# Patient Record
Sex: FEMALE | Race: WHITE | NOT HISPANIC OR LATINO | Employment: FULL TIME | URBAN - METROPOLITAN AREA
[De-identification: names, ages, dates, MRNs, and addresses within clinical notes are randomized per-mention and may not be internally consistent; named-entity substitution may affect disease eponyms.]

---

## 2017-01-14 ENCOUNTER — OFFICE VISIT (OUTPATIENT)
Dept: URGENT CARE | Facility: PHYSICIAN GROUP | Age: 22
End: 2017-01-14
Payer: COMMERCIAL

## 2017-01-14 VITALS
WEIGHT: 135 LBS | RESPIRATION RATE: 16 BRPM | HEIGHT: 65 IN | TEMPERATURE: 100 F | HEART RATE: 74 BPM | SYSTOLIC BLOOD PRESSURE: 108 MMHG | OXYGEN SATURATION: 99 % | DIASTOLIC BLOOD PRESSURE: 74 MMHG | BODY MASS INDEX: 22.49 KG/M2

## 2017-01-14 DIAGNOSIS — J01.90 ACUTE SINUSITIS, RECURRENCE NOT SPECIFIED, UNSPECIFIED LOCATION: ICD-10-CM

## 2017-01-14 DIAGNOSIS — B34.9 ACUTE BRONCHOSPASM DUE TO VIRAL INFECTION: ICD-10-CM

## 2017-01-14 DIAGNOSIS — J98.01 ACUTE BRONCHOSPASM DUE TO VIRAL INFECTION: ICD-10-CM

## 2017-01-14 PROCEDURE — 99214 OFFICE O/P EST MOD 30 MIN: CPT | Performed by: EMERGENCY MEDICINE

## 2017-01-14 RX ORDER — OXYCODONE HYDROCHLORIDE AND ACETAMINOPHEN 5; 325 MG/1; MG/1
1-2 TABLET ORAL EVERY 4 HOURS PRN
COMMUNITY
End: 2017-03-16

## 2017-01-14 RX ORDER — AMOXICILLIN AND CLAVULANATE POTASSIUM 875; 125 MG/1; MG/1
1 TABLET, FILM COATED ORAL 2 TIMES DAILY
Qty: 14 TAB | Refills: 0 | Status: SHIPPED | OUTPATIENT
Start: 2017-01-14 | End: 2017-03-16

## 2017-01-14 ASSESSMENT — ENCOUNTER SYMPTOMS
SINUS PAIN: 1
SHORTNESS OF BREATH: 0
HEADACHES: 1
SORE THROAT: 1
VOMITING: 0
COUGH: 1
RHINORRHEA: 0
NAUSEA: 1
DIARRHEA: 0
WHEEZING: 0
SPUTUM PRODUCTION: 1
HEMOPTYSIS: 0

## 2017-01-14 NOTE — MR AVS SNAPSHOT
"        Karol Baca   2017 4:00 PM   Office Visit   MRN: [id number]    Department:  [facility]   Dept Phone:  [phone]    Description:  Female : 1995   Provider:  Arsalan García M.D.           Reason for Visit     Sinus Problem sinus issues x1 wk      Allergies as of 2017     Allergen Noted Reactions    Sulfa Drugs 2010   Hives      You were diagnosed with     Acute bronchospasm due to viral infection   [933060]       Acute sinusitis, recurrence not specified, unspecified location   [7773903]         Vital Signs     Blood Pressure Pulse Temperature Respirations Height Weight    108/74 mmHg 74 37.8 °C (100 °F) 16 1.651 m (5' 5\") 61.236 kg (135 lb)    Body Mass Index Oxygen Saturation Smoking Status             22.47 kg/m2 99% Current Some Day Smoker         Basic Information     Date Of Birth Sex Race Ethnicity Preferred Language    1995 Female White Unknown English      Health Maintenance        Date Due Completion Dates    IMM HEP B VACCINE (1 of 3 - Primary Series) 1996 ---    IMM HEP A VACCINE (1 of 2 - Standard Series) 1997 ---    IMM HPV VACCINE (1 of 3 - Female 3 Dose Series) 2007 ---    IMM VARICELLA (CHICKENPOX) VACCINE (1 of 2 - 2 Dose Adolescent Series) 2009 ---    IMM MENINGOCOCCAL VACCINE (MCV4) (1 of 1) 2012 ---    IMM DTaP/Tdap/Td Vaccine (1 - Tdap) 2015 ---    IMM INFLUENZA (1) 2016 ---            Current Immunizations     No immunizations on file.      Below and/or attached are the medications your provider expects you to take. Review all of your home medications and newly ordered medications with your provider and/or pharmacist. Follow medication instructions as directed by your provider and/or pharmacist. Please keep your medication list with you and share with your provider. Update the information when medications are discontinued, doses are changed, or new medications (including over-the-counter products) are added; " and carry medication information at all times in the event of emergency situations     Allergies:  SULFA DRUGS - Hives               Medications  Valid as of: January 14, 2017 -  4:46 PM    Generic Name Brand Name Tablet Size Instructions for use    Albuterol Sulfate (AEROSOL POWDER, BREATH ACTIVATED) Albuterol Sulfate 108 (90 BASE) MCG/ACT Inhale 1-2 Puffs by mouth every 6 hours as needed (coughing, wheezing).        Amoxicillin-Pot Clavulanate (Tab) AUGMENTIN 875-125 MG Take 1 Tab by mouth 2 times a day.        Hydrocodone-Acetaminophen (Tab) NORCO 5-325 MG Take 1-2 Tabs by mouth every 6 hours as needed.        Ibuprofen (Tab) MOTRIN 200 MG Take 200 mg by mouth every 6 hours as needed.        Oxycodone-Acetaminophen (Tab) PERCOCET 5-325 MG Take 1-2 Tabs by mouth every four hours as needed.        .                 Medicines prescribed today were sent to:     [facility] [address] [place] [address] [address] [address]    Phone: [phone] Fax: [phone]    Open 24 Hours?: No      Medication refill instructions:       If your prescription bottle indicates you have medication refills left, it is not necessary to call your provider’s office. Please contact your pharmacy and they will refill your medication.    If your prescription bottle indicates you do not have any refills left, you may request refills at any time through one of the following ways: The online [employer] system (except Urgent Care), by calling your provider’s office, or by asking your pharmacy to contact your provider’s office with a refill request. Medication refills are processed only during regular business hours and may not be available until the next business day. Your provider may request additional information or to have a follow-up visit with you prior to refilling your medication.   *Please Note: Medication refills are assigned a new Rx number when refilled electronically. Your pharmacy may  indicate that no refills were authorized even though a new prescription for the same medication is available at the pharmacy. Please request the medicine by name with the pharmacy before contacting your provider for a refill.           Azure Solutions Access Code: [email or web address]  Expires: 1/22/2017  8:16 AM    Azure Solutions  A secure, online tool to manage your health information     [employer] Azure Solutions® is a secure, online tool that connects you to your personalized health information from the privacy of your home -- day or night - making it very easy for you to manage your healthcare. Once the activation process is completed, you can even access your medical information using the Azure Solutions misty, which is available for free in the Apple Misty store or Google Play store.     Azure Solutions provides the following levels of access (as shown below):   My Chart Features   [facility]  Specialists [facility]  Urgent  Care Non-[facility]  Primary Care  Doctor   Email your healthcare team securely and privately 24/7 X X X    Manage appointments: schedule your next appointment; view details of past/upcoming appointments X      Request prescription refills. X      View recent personal medical records, including lab and immunizations X X X X   View health record, including health history, allergies, medications X X X X   Read reports about your outpatient visits, procedures, consult and ER notes X X X X   See your discharge summary, which is a recap of your hospital and/or ER visit that includes your diagnosis, lab results, and care plan. X X       How to register for Azure Solutions:  1. Go to  [email or web address].  2. Click on the Sign Up Now box, which takes you to the New Member Sign Up page. You will need to provide the following information:  a. Enter your Azure Solutions Access Code exactly as it appears at the top of this page. (You will not need to use this code after you’ve completed the sign-up process. If you do not sign up  before the expiration date, you must request a new code.)   b. Enter your date of birth.   c. Enter your home email address.   d. Click Submit, and follow the next screen’s instructions.  3. Create a BUSINESS OWNERS ADVANTAGEt ID. This will be your BUSINESS O[employer] ADVANT[employer]t login ID and cannot be changed, so think of one that is secure and easy to remember.  4. Create a [employer] ADVANT[employer]t password. You can change your password at any time.  5. Enter your Password Reset Question and Answer. This can be used at a later time if you forget your password.   6. Enter your e-mail address. This allows you to receive e-mail notifications when new information is available in [employer].  7. Click Sign Up. You can now view your health information.    For assistance activating your [employer] account, call [phone]

## 2017-01-14 NOTE — Clinical Note
January 14, 2017       Patient: Karol Baca   YOB: 1995   Date of Visit: 1/14/2017         To Whom It May Concern:    It is my medical opinion that Karol Baca should not attend work for three days.      Sincerely,          Arsalan García M.D.  Electronically Signed

## 2017-01-15 NOTE — PROGRESS NOTES
Subjective:      Karol Baca is a 21 y.o. female who presents with Sinus Problem            URI   This is a new problem. The current episode started in the past 7 days. The problem has been gradually improving. Maximum temperature: subjective. Associated symptoms include congestion, coughing, headaches, nausea, sinus pain and a sore throat. Pertinent negatives include no chest pain, diarrhea, ear pain, rash, rhinorrhea, vomiting or wheezing.       Review of Systems   Constitutional: Positive for malaise/fatigue.   HENT: Positive for congestion and sore throat. Negative for ear pain, nosebleeds and rhinorrhea.    Respiratory: Positive for cough and sputum production. Negative for hemoptysis, shortness of breath and wheezing.    Cardiovascular: Negative for chest pain.   Gastrointestinal: Positive for nausea. Negative for vomiting and diarrhea.   Skin: Negative for rash.   Neurological: Positive for headaches.     PMH:  has a past medical history of Unspecified asthma(493.90). She also has no past medical history of Congestive heart failure (CMS-[facility]), Hypertension, Cancer (CMS-HCC), CAD (coronary artery disease), Renal disorder, Infectious disease, Stroke (CMS-HCC), Liver disease, Type II or unspecified type diabetes mellitus without mention of complication, not stated as uncontrolled, Seizure disorder (CMS-HCC), or Psychiatric disorder.  MEDS:   Current outpatient prescriptions:   •  oxycodone-acetaminophen (PERCOCET) 5-325 MG Tab, Take 1-2 Tabs by mouth every four hours as needed., Disp: , Rfl:   •  amoxicillin-clavulanate (AUGMENTIN) 875-125 MG Tab, Take 1 Tab by mouth 2 times a day., Disp: 14 Tab, Rfl: 0  •  Albuterol Sulfate 108 (90 BASE) MCG/ACT AEROSOL POWDER, BREATH ACTIVATED, Inhale 1-2 Puffs by mouth every 6 hours as needed (coughing, wheezing)., Disp: 1 Each, Rfl: 0  •  ibuprofen (MOTRIN) 200 MG Tab, Take 200 mg by mouth every 6 hours as needed., Disp: , Rfl:   •  hydrocodone-acetaminophen (NORCO) 5-325  "MG Tab per tablet, Take 1-2 Tabs by mouth every 6 hours as needed., Disp: 20 Tab, Rfl: 0  ALLERGIES:   Allergies   Allergen Reactions   • Sulfa Drugs Hives     SURGHX:   Past Surgical History   Procedure Laterality Date   • Other orthopedic surgery       SOCHX:  reports that she has been smoking Cigarettes.  She does not have any smokeless tobacco history on file. She reports that she drinks alcohol. She reports that she uses illicit drugs.  FH: family history is not on file.       Objective:     /74 mmHg  Pulse 74  Temp(Src) 37.8 °C (100 °F)  Resp 16  Ht 1.651 m (5' 5\")  Wt 61.236 kg (135 lb)  BMI 22.47 kg/m2  SpO2 99%     Physical Exam   Constitutional: She appears well-developed and well-nourished. She is cooperative. She does not appear ill. No distress.   HENT:   Right Ear: Tympanic membrane and ear canal normal.   Left Ear: Tympanic membrane and ear canal normal.   Nose: Mucosal edema present. No rhinorrhea. Right sinus exhibits maxillary sinus tenderness. Left sinus exhibits maxillary sinus tenderness.   Mouth/Throat: Uvula is midline and oropharynx is clear and moist.   Eyes: Right conjunctiva is injected. Left conjunctiva is injected.   Neck: Trachea normal. Neck supple.   Cardiovascular: Normal rate, regular rhythm and normal heart sounds.    Pulmonary/Chest: Effort normal. She has no decreased breath sounds. She has no wheezes. She has no rhonchi. She has no rales.   Lymphadenopathy:     She has no cervical adenopathy.   Neurological: She is alert.   Skin: Skin is warm and dry.   Psychiatric: She has a normal mood and affect.               Assessment/Plan:     1. Acute bronchospasm due to viral infection  - Albuterol Sulfate 108 (90 BASE) MCG/ACT AEROSOL POWDER, BREATH ACTIVATED; Inhale 1-2 Puffs by mouth every 6 hours as needed (coughing, wheezing).  Dispense: 1 Each; Refill: 0    2. Acute sinusitis, recurrence not specified, unspecified location  Afrin  Wait and see ATB provided  - " amoxicillin-clavulanate (AUGMENTIN) 875-125 MG Tab; Take 1 Tab by mouth 2 times a day.  Dispense: 14 Tab; Refill: 0

## 2017-03-16 ENCOUNTER — HOSPITAL ENCOUNTER (EMERGENCY)
Facility: MEDICAL CENTER | Age: 22
End: 2017-03-16
Attending: EMERGENCY MEDICINE
Payer: COMMERCIAL

## 2017-03-16 VITALS
TEMPERATURE: 98.9 F | DIASTOLIC BLOOD PRESSURE: 66 MMHG | SYSTOLIC BLOOD PRESSURE: 122 MMHG | OXYGEN SATURATION: 100 % | HEART RATE: 60 BPM | HEIGHT: 65 IN | BODY MASS INDEX: 23.51 KG/M2 | WEIGHT: 141.09 LBS | RESPIRATION RATE: 16 BRPM

## 2017-03-16 DIAGNOSIS — T14.8XXA OPEN WOUND: ICD-10-CM

## 2017-03-16 LAB
APPEARANCE UR: CLEAR
COLOR UR: YELLOW
GLUCOSE UR STRIP.AUTO-MCNC: NEGATIVE MG/DL
HCG UR QL: NEGATIVE
KETONES UR STRIP.AUTO-MCNC: NEGATIVE MG/DL
LEUKOCYTE ESTERASE UR QL STRIP.AUTO: NEGATIVE
NITRITE UR QL STRIP.AUTO: NEGATIVE
PH UR STRIP.AUTO: 7.5 [PH]
PROT UR QL STRIP: NEGATIVE MG/DL
RBC UR QL AUTO: ABNORMAL
SP GR UR STRIP.AUTO: 1.02

## 2017-03-16 PROCEDURE — 81025 URINE PREGNANCY TEST: CPT

## 2017-03-16 PROCEDURE — 81002 URINALYSIS NONAUTO W/O SCOPE: CPT

## 2017-03-16 PROCEDURE — 99284 EMERGENCY DEPT VISIT MOD MDM: CPT

## 2017-03-16 RX ORDER — FLUCONAZOLE 100 MG/1
100 TABLET ORAL DAILY
Qty: 3 TAB | Refills: 0 | Status: SHIPPED | OUTPATIENT
Start: 2017-03-16 | End: 2017-10-03

## 2017-03-16 ASSESSMENT — PAIN SCALES - GENERAL: PAINLEVEL_OUTOF10: 4

## 2017-03-16 NOTE — ED AVS SNAPSHOT
Home Care Instructions                                                                                                                Karol Baca   MRN: [id number]    Department:  [facility], Emergency Dept   Date of Visit:  3/16/2017            [facility] Emergency Dept    [address] [place] [address]    Phone:  [phone]      You were seen by     David Gregorio M.D.      Your Diagnosis Was     Open wound     T14.8       Follow-up Information     1. Schedule an appointment as soon as possible for a visit with Your Physician.    Specialty:  Emergency Medicine    Why:  As needed    Contact information    Varies        Medication Information     Review all of your home medications and newly ordered medications with your primary doctor and/or pharmacist as soon as possible. Follow medication instructions as directed by your doctor and/or pharmacist.     Please keep your complete medication list with you and share with your physician. Update the information when medications are discontinued, doses are changed, or new medications (including over-the-counter products) are added; and carry medication information at all times in the event of emergency situations.               Medication List      START taking these medications        Instructions    Morning Afternoon Evening Bedtime    fluconazole 100 MG Tabs   Commonly known as:  DIFLUCAN        Take 1 Tab by mouth every day. Once for yeast infection   Dose:  100 mg                             Where to Get Your Medications      These medications were sent to [facility] [address] [place] [address], [place] [address] [facility]  [address] [facility] [address]     Phone:  [phone]    - fluconazole 100 MG Tabs            Procedures and tests performed during your visit     Procedure/Test Number of Times Performed    NURSING COMMUNICATION 2    POC UA 1    POC URINE PREGNANCY 1        Discharge  Instructions       You have a small wound of the perineum. There is no evidence of STD or yeast infection. Apply antibiotic ointment once daily. If you develop a yeast infection try a dose of fluconazole.          Patient Information     Patient Information    Following emergency treatment: all patient requiring follow-up care must return either to a private physician or a clinic if your condition worsens before you are able to obtain further medical attention, please return to the emergency room.     Billing Information    At [facility], we work to make the billing process streamlined for our patients.  Our Representatives are here to answer any questions you may have regarding your hospital bill.  If you have insurance coverage and have supplied your insurance information to us, we will submit a claim to your insurer on your behalf.  Should you have any questions regarding your bill, we can be reached online or by phone as follows:  Online: You are able pay your bills online or live chat with our representatives about any billing questions you may have. We are here to help Monday - Friday from 8:00am to 7:30pm and 9:00am - 12:00pm on Saturdays.  Please visit [email or web address]  for more information.   Phone:  [phone] or [phone]    Please note that your emergency physician, surgeon, pathologist, radiologist, anesthesiologist, and other specialists are not employed by [facility] and will therefore bill separately for their services.  Please contact them directly for any questions concerning their bills at the numbers below:     Emergency Physician Services:  [phone]  [facility]:  [phone]  [facility]:  [phone]  [facility]:  [phone]    1. Your final bill may vary from the amount quoted upon discharge if all procedures are not complete at that time, or if your doctor has additional procedures of which  we are not aware. You will receive an additional bill if you return to the Emergency Department at [facility] for suture removal regardless of the facility of which the sutures were placed.     2. Please arrange for settlement of this account at the emergency registration.    3. All self-pay accounts are due in full at the time of treatment.  If you are unable to meet this obligation then payment is expected within 4-5 days.     4. If you have had radiology studies (CT, X-ray, Ultrasound, MRI), you have received a preliminary result during your emergency department visit. Please contact the radiology department [phone] to receive a copy of your final result. Please discuss the Final result with your primary physician or with the follow up physician provided.     Crisis Hotline:  [place] Crisis Hotline:  6-872-CTWZWCH or 1-116.230.9697  [place] Crisis Hotline:    1-680.702.8387 or 197-156-6690         ED Discharge Follow Up Questions    1. In order to provide you with very good care, we would like to follow up with a phone call in the next few days.  May we have your permission to contact you?     YES /  NO    2. What is the best phone number to call you? (       )_____-__________    3. What is the best time to call you?      Morning  /  Afternoon  /  Evening                   Patient Signature:  ____________________________________________________________    Date:  ____________________________________________________________

## 2017-03-16 NOTE — ED AVS SNAPSHOT
Tendr Access Code: [email or web address]  Expires: 3/21/2017 11:06 AM    Tendr  A secure, online tool to manage your health information     [employer] T[employer]® is a secure, online tool that connects you to your personalized health information from the privacy of your home -- day or night - making it very easy for you to manage your healthcare. Once the activation process is completed, you can even access your medical information using the Tendr misty, which is available for free in the Apple Misty store or Google Play store.     Tendr provides the following levels of access (as shown below):   My Chart Features   [facility] Primary Care Doctor [facility]  Specialists [facility]  Urgent  Care Non-[facility]  Primary Care  Doctor   Email your healthcare team securely and privately 24/7 X X X X   Manage appointments: schedule your next appointment; view details of past/upcoming appointments X      Request prescription refills. X      View recent personal medical records, including lab and immunizations X X X X   View health record, including health history, allergies, medications X X X X   Read reports about your outpatient visits, procedures, consult and ER notes X X X X   See your discharge summary, which is a recap of your hospital and/or ER visit that includes your diagnosis, lab results, and care plan. X X       How to register for Tendr:  1. Go to  [email or web address].  2. Click on the Sign Up Now box, which takes you to the New Member Sign Up page. You will need to provide the following information:  a. Enter your Tendr Access Code exactly as it appears at the top of this page. (You will not need to use this code after you’ve completed the sign-up process. If you do not sign up before the expiration date, you must request a new code.)   b. Enter your date of birth.   c. Enter your home email address.   d. Click Submit, and follow the next screen’s instructions.  3. Create a Tendr ID. This will be your Tendr  login ID and cannot be changed, so think of one that is secure and easy to remember.  4. Create a [employer] password. You can change your password at any time.  5. Enter your Password Reset Question and Answer. This can be used at a later time if you forget your password.   6. Enter your e-mail address. This allows you to receive e-mail notifications when new information is available in [employer].  7. Click Sign Up. You can now view your health information.    For assistance activating your [employer] account, call [phone]

## 2017-03-16 NOTE — ED AVS SNAPSHOT
3/16/2017          Karol Baca  [address]  [place] [address]    Dear Karol:    [facility] wants to ensure your discharge home is safe and you or your loved ones have had all your questions answered regarding your care after you leave the hospital.    You may receive a telephone call within two days of your discharge.  This call is to make certain you understand your discharge instructions as well as ensure we provided you with the best care possible during your stay with us.     The call will only last approximately 3-5 minutes and will be done by a nurse.    Once again, we want to ensure your discharge home is safe and that you have a clear understanding of any next steps in your care.  If you have any questions or concerns, please do not hesitate to contact us, we are here for you.  Thank you for choosing [facility] for your healthcare needs.    Sincerely,    Celso Leija    [facility]

## 2017-03-17 NOTE — ED NOTES
"Pt here with c/o    Chief Complaint   Patient presents with   • Vaginal Pain     pt noticed white bumps on labia this am, now bleeding from bumps   • Irregular Menses     pt having irregular periods, new onset- c/o dark brown blood with clots currently       /78 mmHg  Pulse 61  Temp(Src) 37.6 °C (99.7 °F)  Resp 12  Ht 1.651 m (5' 5\")  Wt 64 kg (141 lb 1.5 oz)  BMI 23.48 kg/m2  SpO2 100%    "

## 2017-03-17 NOTE — ED PROVIDER NOTES
ED Provider Note    CHIEF COMPLAINT   Chief Complaint   Patient presents with   • Vaginal Pain     pt noticed white bumps on labia this am, now bleeding from bumps   • Irregular Menses     pt having irregular periods, new onset- c/o dark brown blood with clots currently       HPI   Karol Baca is a 22 y.o. female who presents for white bumps on the introitus which she noticed today after bathing. There's been a scant amount of bleeding from them. They're itchy but not painful. Prior yeast infection. Denies pregnancy. Had a three-week irregular menstrual period which is atypical and just finished a day ago. She is a  S[employer] 1. She's had chlamydia remotely. History of chronic abdominal pain and laparoscopy without endometriosis or ovarian cysts. She is currently sexually active. No abdominal pain, fever, nausea or vomiting. She has had some diarrhea over the last 3 weeks    REVIEW OF SYSTEMS  Pertinent positives include: Perineal lesions, itching, diarrhea, prolonged menstrual period.  Pertinent negatives include: Fever, Skarria, urgency, frequency, rhinorrhea, sore throat, cough.  10+ systems reviewed and negative.     PAST MEDICAL HISTORY  Past Medical History   Diagnosis Date   • Unspecified asthma(493.90)        SOCIAL HISTORY  Social History   Substance Use Topics   • Smoking status: Current Some Day Smoker -- 0.50 packs/day     Types: Cigarettes   • Smokeless tobacco: Not on file   • Alcohol Use: Yes      Comment: occ     .    SURGICAL HISTORY  Past Surgical History   Procedure Laterality Date   • Other orthopedic surgery       frx arm   • Gyn surgery       exploratory lap       CURRENT MEDICATIONS  Home Medications     Reviewed by Yuli Loving R.N. (Registered Nurse) on 17 at 2024  Med List Status: Complete    Medication Last Dose Status          Patient [place] Taking any Medications                        ALLERGIES  Allergies   Allergen Reactions   • Sulfa Drugs Hives       PHYSICAL  "EXAM  VITAL SIGNS: /78 mmHg  Pulse 61  Temp(Src) 37.6 °C (99.7 °F)  Resp 12  Ht 1.651 m (5' 5\")  Wt 64 kg (141 lb 1.5 oz)  BMI 23.48 kg/m2  SpO2 100%  LMP 02/26/2017 (Approximate)  Constitutional: Well developed, Well nourished well-appearing.   HENT: Normocephalic, Atraumatic, Oropharynx moist.  Eyes: PERRLA, Conjunctiva pink.   Cardiovascular: Regular rate and rhythm, No murmurs, No rubs, No gallops.   Respiratory: Normal breath sounds, No respiratory distress, No wheezing.   Gastrointestinal: Soft, minimal suprapubic tenderness, no rebound or guarding, no masses.  Genitourinary: No flank tenderness. Perineum has a small 2 mm scratch wound. There are no other ulcerations or lesions, there is no white discharge. Since patient had no discharge or abdominal pain complaints pelvic exam not performed beyond perineal exam.  Skin: Warm, Dry, No erythema, No rash.   Back: No tenderness.   Neurologic: Alert & oriented x 3, Moves all extremities with strength.  Psychiatric: Affect normal, Judgment normal, Mood normal.       COURSE & MEDICAL DECISION MAKING;  Well-appearing patient presents with a bleeding wound of the perineum is a superficial scratch likely from cleaning. There is no evidence of herpes or candida. She denies abdominal pain or vaginal discharge. She's had an abnormal. These past 3 weeks but is not pregnant and there is no UTI.    PLAN:  Reassurance  Antibiotic ointment  Return for pain, fever, discharge    CONDITION: Stable.    FINAL IMPRESSION:  1. Open wound          Electronically signed by: David Gregorio, 3/16/2017 8:52 PM    "

## 2017-03-17 NOTE — ED NOTES
Pelvic exam performed on pt by ERP with female chaperone at the bedside. Privacy maintained. Pt tolerated procedure well. Pt assisted from lithotomy to supine position after procedure. Warm blanket provided. Call light within reach.

## 2017-03-17 NOTE — DISCHARGE INSTRUCTIONS
You have a small wound of the perineum. There is no evidence of STD or yeast infection. Apply antibiotic ointment once daily. If you develop a yeast infection try a dose of fluconazole.

## 2017-09-18 ENCOUNTER — HOSPITAL ENCOUNTER (OUTPATIENT)
Facility: MEDICAL CENTER | Age: 22
End: 2017-09-18
Attending: PHYSICIAN ASSISTANT
Payer: COMMERCIAL

## 2017-09-18 ENCOUNTER — OFFICE VISIT (OUTPATIENT)
Dept: URGENT CARE | Facility: PHYSICIAN GROUP | Age: 22
End: 2017-09-18
Payer: COMMERCIAL

## 2017-09-18 VITALS
HEART RATE: 56 BPM | RESPIRATION RATE: 12 BRPM | HEIGHT: 65 IN | BODY MASS INDEX: 23.32 KG/M2 | DIASTOLIC BLOOD PRESSURE: 58 MMHG | WEIGHT: 140 LBS | SYSTOLIC BLOOD PRESSURE: 100 MMHG | TEMPERATURE: 98.3 F | OXYGEN SATURATION: 98 %

## 2017-09-18 DIAGNOSIS — N89.8 VAGINAL DISCHARGE: ICD-10-CM

## 2017-09-18 DIAGNOSIS — N80.9 ENDOMETRIOSIS: ICD-10-CM

## 2017-09-18 DIAGNOSIS — R10.2 SUPRAPUBIC ABDOMINAL PAIN: ICD-10-CM

## 2017-09-18 LAB
APPEARANCE UR: ABNORMAL
BILIRUB UR STRIP-MCNC: NEGATIVE MG/DL
COLOR UR AUTO: YELLOW
GLUCOSE UR STRIP.AUTO-MCNC: NEGATIVE MG/DL
INT CON NEG: NEGATIVE
INT CON POS: POSITIVE
KETONES UR STRIP.AUTO-MCNC: NEGATIVE MG/DL
LEUKOCYTE ESTERASE UR QL STRIP.AUTO: ABNORMAL
NITRITE UR QL STRIP.AUTO: NEGATIVE
PH UR STRIP.AUTO: 8.5 [PH] (ref 5–8)
POC URINE PREGNANCY TEST: NEGATIVE
PROT UR QL STRIP: 300 MG/DL
RBC UR QL AUTO: ABNORMAL
SP GR UR STRIP.AUTO: 1
UROBILINOGEN UR STRIP-MCNC: NEGATIVE MG/DL

## 2017-09-18 PROCEDURE — 81002 URINALYSIS NONAUTO W/O SCOPE: CPT | Performed by: PHYSICIAN ASSISTANT

## 2017-09-18 PROCEDURE — 87510 GARDNER VAG DNA DIR PROBE: CPT

## 2017-09-18 PROCEDURE — 87660 TRICHOMONAS VAGIN DIR PROBE: CPT

## 2017-09-18 PROCEDURE — 87480 CANDIDA DNA DIR PROBE: CPT

## 2017-09-18 PROCEDURE — 87491 CHLMYD TRACH DNA AMP PROBE: CPT

## 2017-09-18 PROCEDURE — 87591 N.GONORRHOEAE DNA AMP PROB: CPT

## 2017-09-18 PROCEDURE — 81025 URINE PREGNANCY TEST: CPT | Performed by: PHYSICIAN ASSISTANT

## 2017-09-18 PROCEDURE — 99214 OFFICE O/P EST MOD 30 MIN: CPT | Performed by: PHYSICIAN ASSISTANT

## 2017-09-18 PROCEDURE — 87086 URINE CULTURE/COLONY COUNT: CPT

## 2017-09-18 RX ORDER — METRONIDAZOLE 7.5 MG/G
1 GEL VAGINAL
Qty: 5 TUBE | Refills: 0 | Status: SHIPPED | OUTPATIENT
Start: 2017-09-18 | End: 2017-09-23

## 2017-09-18 ASSESSMENT — ENCOUNTER SYMPTOMS
PSYCHIATRIC NEGATIVE: 1
GASTROINTESTINAL NEGATIVE: 1
CARDIOVASCULAR NEGATIVE: 1
MUSCULOSKELETAL NEGATIVE: 1
EYES NEGATIVE: 1
CONSTITUTIONAL NEGATIVE: 1
RESPIRATORY NEGATIVE: 1
NEUROLOGICAL NEGATIVE: 1

## 2017-09-18 NOTE — LETTER
September 18, 2017         Patient: Karol Baca   YOB: 1995   Date of Visit: 9/18/2017           To Whom it May Concern:    Karol Baca was seen in my clinic on 9/18/2017. Please excuse her from work today.    If you have any questions or concerns, please don't hesitate to call.        Sincerely,           Shahab Osborn P.A.-C.  Electronically Signed

## 2017-09-19 ENCOUNTER — TELEPHONE (OUTPATIENT)
Dept: URGENT CARE | Facility: CLINIC | Age: 22
End: 2017-09-19

## 2017-09-19 DIAGNOSIS — R10.2 SUPRAPUBIC ABDOMINAL PAIN: ICD-10-CM

## 2017-09-19 DIAGNOSIS — N89.8 VAGINAL DISCHARGE: ICD-10-CM

## 2017-09-19 LAB
CANDIDA DNA VAG QL PROBE+SIG AMP: NEGATIVE
G VAGINALIS DNA VAG QL PROBE+SIG AMP: NEGATIVE
T VAGINALIS DNA VAG QL PROBE+SIG AMP: NEGATIVE

## 2017-09-19 NOTE — PATIENT INSTRUCTIONS
Bacterial Vaginosis  Bacterial vaginosis is a vaginal infection that occurs when the normal balance of bacteria in the vagina is disrupted. It results from an overgrowth of certain bacteria. This is the most common vaginal infection in women of childbearing age. Treatment is important to prevent complications, especially in pregnant women, as it can cause a premature delivery.  CAUSES   Bacterial vaginosis is caused by an increase in harmful bacteria that are normally present in smaller amounts in the vagina. Several different kinds of bacteria can cause bacterial vaginosis. However, the reason that the condition develops is not fully understood.  RISK FACTORS  Certain activities or behaviors can put you at an increased risk of developing bacterial vaginosis, including:  · Having a new sex partner or multiple sex partners.  · Douching.  · Using an intrauterine device (IUD) for contraception.  Women do not get bacterial vaginosis from toilet seats, bedding, swimming pools, or contact with objects around them.  SIGNS AND SYMPTOMS   Some women with bacterial vaginosis have no signs or symptoms. Common symptoms include:  · Grey vaginal discharge.  · A fishlike odor with discharge, especially after sexual intercourse.  · Itching or burning of the vagina and vulva.  · Burning or pain with urination.  DIAGNOSIS   Your health care provider will take a medical history and examine the vagina for signs of bacterial vaginosis. A sample of vaginal fluid may be taken. Your health care provider will look at this sample under a microscope to check for bacteria and abnormal cells. A vaginal pH test may also be done.   TREATMENT   Bacterial vaginosis may be treated with antibiotic medicines. These may be given in the form of a pill or a vaginal cream. A second round of antibiotics may be prescribed if the condition comes back after treatment. Because bacterial vaginosis increases your risk for sexually transmitted diseases, getting  treated can help reduce your risk for chlamydia, gonorrhea, HIV, and herpes.  HOME CARE INSTRUCTIONS   · Only take over-the-counter or prescription medicines as directed by your health care provider.  · If antibiotic medicine was prescribed, take it as directed. Make sure you finish it even if you start to feel better.  · Tell all sexual partners that you have a vaginal infection. They should see their health care provider and be treated if they have problems, such as a mild rash or itching.  · During treatment, it is important that you follow these instructions:  ¨ Avoid sexual activity or use condoms correctly.  ¨ Do not douche.  ¨ Avoid alcohol as directed by your health care provider.  ¨ Avoid breastfeeding as directed by your health care provider.  SEEK MEDICAL CARE IF:   · Your symptoms are not improving after 3 days of treatment.  · You have increased discharge or pain.  · You have a fever.  MAKE SURE YOU:   · Understand these instructions.  · Will watch your condition.  · Will get help right away if you are not doing well or get worse.  FOR MORE INFORMATION   Centers for Disease Control and Prevention, Division of STD Prevention: www.cdc.gov/std  American Sexual Health Association (ARIC): www.ashastd.org      This information is not intended to replace advice given to you by your health care provider. Make sure you discuss any questions you have with your health care provider.     Document Released: 12/18/2006 Document Revised: 01/08/2016 Document Reviewed: 07/30/2014  [employer] Interactive Patient Education ©2016 [employer]  C

## 2017-09-19 NOTE — PROGRESS NOTES
Subjective:      Karol Baca is a 22 y.o. female who presents with Vaginal Discharge (x 1 weed discharge is milk looking and brownish/red, has had unprotected sex, took plan B approx 1 month ago)            HPI  Chief Complaint   Patient presents with   • Vaginal Discharge     x 1 weed discharge is milk looking and brownish/red, has had unprotected sex, took plan B approx 1 month ago       HPI:  Karol Baca is a 22 y.o. female who presents with worsening lower abdominal pains for the last month or two.  Possible hx of PCOS and maybe endometriosis.  Having milky discharge for the last week and darker color.  Patient denies HA, SOB, chest pain, palpitations, fever, chills, or n/v/d.    LMP: 2 months ago.    Took a plan B a month ago.    Some breast tenderness.    With a monogamous partner.      Slight odor.  Slightly  More after intercourse.    Past Medical History:   Diagnosis Date   • Unspecified asthma(493.90)        Past Surgical History:   Procedure Laterality Date   • GYN SURGERY      exploratory lap   • OTHER ORTHOPEDIC SURGERY      frx arm       No family history on file.    Social History     Social History   • Marital status: Single     Spouse name: N/A   • Number of children: N/A   • Years of education: N/A     Occupational History   • Not on file.     Social History Main Topics   • Smoking status: Current Some Day Smoker     Packs/day: 0.50     Types: Cigarettes   • Smokeless tobacco: Not on file   • Alcohol use Yes      Comment: occ   • Drug use:      Types: Intravenous   • Sexual activity: Not on file     Other Topics Concern   • Not on file     Social History Narrative   • No narrative on file         Current Outpatient Prescriptions:   •  fluconazole, 100 mg, Oral, DAILY    Allergies   Allergen Reactions   • Sulfa Drugs Hives        Review of Systems   Constitutional: Negative.    HENT: Negative.    Eyes: Negative.    Respiratory: Negative.    Cardiovascular: Negative.    Gastrointestinal:  "Negative.    Genitourinary:        Vaginal discharge   Musculoskeletal: Negative.    Skin: Negative.    Neurological: Negative.    Endo/Heme/Allergies: Negative.    Psychiatric/Behavioral: Negative.           Objective:     /58   Pulse (!) 56   Temp 36.8 °C (98.3 °F)   Resp 12   Ht 1.651 m (5' 5\")   Wt 63.5 kg (140 lb)   LMP 07/24/2017   SpO2 98%   BMI 23.30 kg/m²      Physical Exam        Constitutional:   Appropriately groomed, pleasant affect, well nourished, in NAD.    Head:   Normocephalic, atraumatic.    Eyes:   EOM's full, sclera white, conjunctiva not erythematous, and medial canthus without exudate bilaterally.    Throat:  Dentition wnl, mucosa moist without lesions.      Lungs:  Respiratory effort not labored without accessory muscle use.      Abdominal:  Abdomen soft to palpation with mild suprapubic ttp.  No masses or hepatosplenomegaly.  No CVA tenderness bilaterally to percussion.    Speculum pelvic exam demonstrates a moderate amount of white discharge at the fornix of the vaginal vault and a pink cervix. Moderate CMT noted.  Mild tenderness to palpation over the suprapubic region.  Adnexa are not ttp bilaterally.    Musculoskeletal:  Gait nonantalgic with a narrow base.    Derm:  Skin without rashes or lesions with good turgor pressure.      Psychiatric:  Mood, affect, and judgement appropriate.       Assessment/Plan:     1. Vaginal discharge  POCT PREGNANCY    VAGINAL PATHOGENS DNA PANEL    CHLAMYDIA/GC PCR URINE OR SWAB    metronidazole (METROGEL-VAGINAL) 0.75 % Gel    REFERRAL TO GYNECOLOGY    POCT Urinalysis   2. Endometriosis  REFERRAL TO GYNECOLOGY      Patient presents with Worsening vaginal discharge for the past week.  Hx of endometriosis vs PCOS via exploratory laparoscopic surgery. On exam today watermelon white-anastasia discharge in the vaginal vault with an erythematous cervix with no friability. CMT noted on exam. Plan to treat with metronidazole gel and obtain Chlamydia " gonorrhea and vaginal pathogen labs referral to gynecology. UA unremarkable urine pregnancy negative.    Patient was in agreement with this treatment plan and seemed to understand without barriers. All questions were encouraged and answered.  Reviewed signs and symptoms of when to seek emergency medical care.     Please note that this dictation was created using voice recognition software.  I have made every reasonable attempt to correct obvious errors, but I expect there are errors of nithya and possibly content that I did not discover before finalizing the note.

## 2017-09-20 LAB
C TRACH DNA SPEC QL NAA+PROBE: NEGATIVE
N GONORRHOEA DNA SPEC QL NAA+PROBE: NEGATIVE
SPECIMEN SOURCE: NORMAL

## 2017-09-20 NOTE — TELEPHONE ENCOUNTER
Called and spoke to patient regarding Vag path lab.  Symptoms have continued.  Advised to wait until gc/chlamydia test is back.  Patient has not filled flaygl, waiting until Thursday.  All questions encouraged and answered.

## 2017-09-21 ENCOUNTER — TELEPHONE (OUTPATIENT)
Dept: URGENT CARE | Facility: CLINIC | Age: 22
End: 2017-09-21

## 2017-09-21 LAB
BACTERIA UR CULT: NORMAL
SIGNIFICANT IND 70042: NORMAL
SOURCE SOURCE: NORMAL

## 2017-09-21 NOTE — TELEPHONE ENCOUNTER
Spoke to patient and informed her of neg GC/Chlamydia.  Advised establishing with PCP.  Referral to gyn pending.  Did advise filling flagyl for trial of improvement (vaginal gel).  Advised her to establish to discuss PAP smear and further work-up/tx of endometriosis.  All questions encouraged and answered.

## 2017-09-22 ENCOUNTER — OFFICE VISIT (OUTPATIENT)
Dept: MEDICAL GROUP | Facility: PHYSICIAN GROUP | Age: 22
End: 2017-09-22
Payer: COMMERCIAL

## 2017-09-22 VITALS
RESPIRATION RATE: 16 BRPM | OXYGEN SATURATION: 100 % | HEIGHT: 65 IN | BODY MASS INDEX: 23.49 KG/M2 | WEIGHT: 141 LBS | SYSTOLIC BLOOD PRESSURE: 102 MMHG | DIASTOLIC BLOOD PRESSURE: 74 MMHG | HEART RATE: 60 BPM | TEMPERATURE: 98.6 F

## 2017-09-22 DIAGNOSIS — G43.019 INTRACTABLE MIGRAINE WITHOUT AURA AND WITHOUT STATUS MIGRAINOSUS: ICD-10-CM

## 2017-09-22 DIAGNOSIS — Z00.00 ROUTINE GENERAL MEDICAL EXAMINATION AT A HEALTH CARE FACILITY: ICD-10-CM

## 2017-09-22 DIAGNOSIS — J45.20 MILD INTERMITTENT ASTHMA WITHOUT COMPLICATION: ICD-10-CM

## 2017-09-22 DIAGNOSIS — B97.89 SORE THROAT (VIRAL): ICD-10-CM

## 2017-09-22 DIAGNOSIS — J02.8 SORE THROAT (VIRAL): ICD-10-CM

## 2017-09-22 DIAGNOSIS — F33.41 RECURRENT MAJOR DEPRESSIVE DISORDER, IN PARTIAL REMISSION (HCC): ICD-10-CM

## 2017-09-22 DIAGNOSIS — N80.9 ENDOMETRIOSIS: ICD-10-CM

## 2017-09-22 PROBLEM — G43.009 MIGRAINE WITHOUT AURA: Status: ACTIVE | Noted: 2017-09-22

## 2017-09-22 PROCEDURE — 99214 OFFICE O/P EST MOD 30 MIN: CPT | Performed by: INTERNAL MEDICINE

## 2017-09-22 ASSESSMENT — PATIENT HEALTH QUESTIONNAIRE - PHQ9: CLINICAL INTERPRETATION OF PHQ2 SCORE: 0

## 2017-09-22 NOTE — ASSESSMENT & PLAN NOTE
Only on exertion. She has not used inhaler in long time. She denies shortness of breath, wheezing, cough

## 2017-09-22 NOTE — ASSESSMENT & PLAN NOTE
She has frequent absences leave from work. She has had laparoscopy. She was told hysterectomy. She got scared and never followed up. She has been taken percocet for pain. No pain today. Her menstrual cycle is irregular, painful.   She is asking for FMLA paper to fill out. I will help her, but she needs to be evaluated by GYN

## 2017-09-23 NOTE — PROGRESS NOTES
New Patient to Establish    Reason to establish: FMLA paper, endometriosis    Karol Baca is a 22 y.o. female here today for evaluation and management of:    Endometriosis  She has frequent absences leave from work. She has had laparoscopy. She was told hysterectomy. She got scared and never followed up. She has been taken percocet for pain. No pain today. Her menstrual cycle is irregular, painful.   She is asking for FMLA paper to fill out. I will help her, but she needs to be evaluated by GYN     Mild intermittent asthma without complication  Only on exertion. She has not used inhaler in long time. She denies shortness of breath, wheezing, cough    Migraine without aura  Headaches associated with menstrual cycle. No aura. She takes Excedrin prn    Sore throat (viral)  She had just gone through a cold, she has white discharges on the left tonsils.     Recurrent major depressive disorder, in partial remission (CMS-HCC)  At the end of the appointment, she reports depression, sometimes binge drinking, smoking. She stopped marijuana, but she used to do . She denies ever use of IV drugs, or suicidal thoughts or attempts. She tells me that she is feeling fine, she had tried zoloft, but she does not want to be on meds       Past Medical History:   Diagnosis Date   • Sore throat (viral) 9/22/2017   • Recurrent major depressive disorder, in partial remission (CMS-HCC) 9/22/2017   • Unspecified asthma        Current Outpatient Prescriptions   Medication Sig Dispense Refill   • metronidazole (METROGEL-VAGINAL) 0.75 % Gel Insert 1 Applicator in vagina every bedtime for 5 days. 5 Tube 0   • fluconazole (DIFLUCAN) 100 MG Tab Take 1 Tab by mouth every day. Once for yeast infection 3 Tab 0     No current facility-administered medications for this visit.        Allergies as of 09/22/2017 - Reviewed 09/22/2017   Allergen Reaction Noted   • Sulfa drugs Hives 08/12/2010       Social History     Social History   • Marital status:  "Single     Spouse name: N/A   • Number of children: N/A   • Years of education: N/A     Occupational History   • Not on file.     Social History Main Topics   • Smoking status: Former Smoker     Packs/day: 0.50     Types: Cigarettes     Quit date: 9/15/2017   • Smokeless tobacco: Never Used   • Alcohol use Yes      Comment: occ   • Drug use: No      Comment: smoked marijuana,    • Sexual activity: Yes     Other Topics Concern   • Not on file     Social History Narrative   • No narrative on file       Family History   Problem Relation Age of Onset   • Stroke Maternal Grandmother    • Cancer Maternal Grandfather      lung cancer   • Heart Disease Paternal Grandmother        Past Surgical History:   Procedure Laterality Date   • GYN SURGERY      exploratory lap   • OTHER ORTHOPEDIC SURGERY      frx arm   • WIDE EXCISION MELANOMA, LEG, W/POSS.STSG         ROS: All systems reviewed are negative except for HPI    /74   Pulse 60   Temp 37 °C (98.6 °F)   Resp 16   Ht 1.651 m (5' 5\")   Wt 64 kg (141 lb)   LMP 07/24/2017   SpO2 100%   Breastfeeding? No   BMI 23.46 kg/m²     Physical Exam  General:  Alert and oriented, No apparent distress.  Eyes: Pupils equal and reactive. No scleral icterus. EOMI  Throat: + erythema and white layers on left tonsils . Oral mucosa moist, oral dental intact  Neck: Supple. No cervical or supraclavicular lymphadenopathy noted. Thyroid not enlarged.  Lungs: normal effort,  Clear to auscultation  Cardiovascular: Regular rate and rhythm. No murmurs, rubs or gallops, pulses intact   Abdomen:  Soft, +BS, no tenderness. No rebound or guarding noted. No hepato or splenomegaly   Extremities: No clubbing, cyanosis, edema.  Neuro: cranial nerves intact, sensation intact   Muscle skeletal:   Skin: Clear. No rash or suspicious skin lesions noted.  Other:       Assessment and Plan    1. Endometriosis  CBC to follow rule out anemia. Advised patient follow up with GYN referral which was placed by " urgent care physician  - COMP METABOLIC PANEL; Future  - CBC WITH DIFFERENTIAL; Future  - TSH WITH REFLEX TO FT4; Future    2. Mild intermittent asthma without complication  Refill albuterol as needed    3. Routine general medical examination at a health care facility    - COMP METABOLIC PANEL; Future    4. Intractable migraine without aura and without status migrainosus  Stable, Excedrin prn. I advised pt to follow up if in case frequent headache     5. Sore throat (viral)  Well hydration, rest. Call us if worse, or fever     6. Recurrent major depressive disorder, in partial remission (CMS-HCC)  She tells me that she is great. Some counseling provided, but pt is not open today.       Followup: Return if symptoms worsen or fail to improve.    Signed by: Haydre Rene M.D.

## 2017-09-23 NOTE — ASSESSMENT & PLAN NOTE
At the end of the appointment, she reports depression, sometimes binge drinking, smoking. She stopped marijuana, but she used to do . She denies ever use of IV drugs, or suicidal thoughts or attempts. She tells me that she is feeling fine, she had tried zoloft, but she does not want to be on meds

## 2017-09-25 ENCOUNTER — HOSPITAL ENCOUNTER (OUTPATIENT)
Dept: LAB | Facility: MEDICAL CENTER | Age: 22
End: 2017-09-25
Attending: INTERNAL MEDICINE
Payer: COMMERCIAL

## 2017-09-25 DIAGNOSIS — Z00.00 ROUTINE GENERAL MEDICAL EXAMINATION AT A HEALTH CARE FACILITY: ICD-10-CM

## 2017-09-25 DIAGNOSIS — N80.9 ENDOMETRIOSIS: ICD-10-CM

## 2017-09-25 LAB
ALBUMIN SERPL BCP-MCNC: 3.7 G/DL (ref 3.2–4.9)
ALBUMIN/GLOB SERPL: 1.1 G/DL
ALP SERPL-CCNC: 52 U/L (ref 30–99)
ALT SERPL-CCNC: 14 U/L (ref 2–50)
ANION GAP SERPL CALC-SCNC: 7 MMOL/L (ref 0–11.9)
AST SERPL-CCNC: 16 U/L (ref 12–45)
BASOPHILS # BLD AUTO: 0.7 % (ref 0–1.8)
BASOPHILS # BLD: 0.06 K/UL (ref 0–0.12)
BILIRUB SERPL-MCNC: 0.3 MG/DL (ref 0.1–1.5)
BUN SERPL-MCNC: 16 MG/DL (ref 8–22)
CALCIUM SERPL-MCNC: 9.2 MG/DL (ref 8.5–10.5)
CHLORIDE SERPL-SCNC: 109 MMOL/L (ref 96–112)
CO2 SERPL-SCNC: 25 MMOL/L (ref 20–33)
CREAT SERPL-MCNC: 0.77 MG/DL (ref 0.5–1.4)
EOSINOPHIL # BLD AUTO: 0.26 K/UL (ref 0–0.51)
EOSINOPHIL NFR BLD: 2.9 % (ref 0–6.9)
ERYTHROCYTE [DISTWIDTH] IN BLOOD BY AUTOMATED COUNT: 42.5 FL (ref 35.9–50)
GFR SERPL CREATININE-BSD FRML MDRD: >60 ML/MIN/1.73 M 2
GLOBULIN SER CALC-MCNC: 3.3 G/DL (ref 1.9–3.5)
GLUCOSE SERPL-MCNC: 86 MG/DL (ref 65–99)
HCT VFR BLD AUTO: 44.5 % (ref 37–47)
HGB BLD-MCNC: 14.4 G/DL (ref 12–16)
IMM GRANULOCYTES # BLD AUTO: 0.04 K/UL (ref 0–0.11)
IMM GRANULOCYTES NFR BLD AUTO: 0.4 % (ref 0–0.9)
LYMPHOCYTES # BLD AUTO: 2.26 K/UL (ref 1–4.8)
LYMPHOCYTES NFR BLD: 25.2 % (ref 22–41)
MCH RBC QN AUTO: 29.6 PG (ref 27–33)
MCHC RBC AUTO-ENTMCNC: 32.4 G/DL (ref 33.6–35)
MCV RBC AUTO: 91.4 FL (ref 81.4–97.8)
MONOCYTES # BLD AUTO: 0.57 K/UL (ref 0–0.85)
MONOCYTES NFR BLD AUTO: 6.4 % (ref 0–13.4)
NEUTROPHILS # BLD AUTO: 5.78 K/UL (ref 2–7.15)
NEUTROPHILS NFR BLD: 64.4 % (ref 44–72)
NRBC # BLD AUTO: 0 K/UL
NRBC BLD AUTO-RTO: 0 /100 WBC
PLATELET # BLD AUTO: 357 K/UL (ref 164–446)
PMV BLD AUTO: 11.2 FL (ref 9–12.9)
POTASSIUM SERPL-SCNC: 4.2 MMOL/L (ref 3.6–5.5)
PROT SERPL-MCNC: 7 G/DL (ref 6–8.2)
RBC # BLD AUTO: 4.87 M/UL (ref 4.2–5.4)
SODIUM SERPL-SCNC: 141 MMOL/L (ref 135–145)
TSH SERPL DL<=0.005 MIU/L-ACNC: 2.59 UIU/ML (ref 0.3–3.7)
WBC # BLD AUTO: 9 K/UL (ref 4.8–10.8)

## 2017-09-25 PROCEDURE — 85025 COMPLETE CBC W/AUTO DIFF WBC: CPT

## 2017-09-25 PROCEDURE — 84443 ASSAY THYROID STIM HORMONE: CPT

## 2017-09-25 PROCEDURE — 36415 COLL VENOUS BLD VENIPUNCTURE: CPT

## 2017-09-25 PROCEDURE — 80053 COMPREHEN METABOLIC PANEL: CPT

## 2017-09-26 ENCOUNTER — TELEPHONE (OUTPATIENT)
Dept: MEDICAL GROUP | Facility: PHYSICIAN GROUP | Age: 22
End: 2017-09-26

## 2017-09-26 NOTE — TELEPHONE ENCOUNTER
----- Message from Hayder Rene M.D. sent at 9/26/2017  7:05 AM PDT -----  Please let patient know that electrolytes, kidney function, liver enzymes, thyroid test , fasting glucose are all normal  Thank you,  Hayder Rene M.D.

## 2017-09-26 NOTE — TELEPHONE ENCOUNTER
VOICEMAIL  1. Caller Name: Karol Baca                        Call Back Number: [phone] (home)       2. Message: Called and left patient a message to call back to get lab results     3. Patient approves office to leave a detailed voicemail/MyChart message: N\A

## 2017-10-02 ENCOUNTER — TELEPHONE (OUTPATIENT)
Dept: MEDICAL GROUP | Facility: PHYSICIAN GROUP | Age: 22
End: 2017-10-02

## 2017-10-02 ENCOUNTER — APPOINTMENT (OUTPATIENT)
Dept: MEDICAL GROUP | Facility: PHYSICIAN GROUP | Age: 22
End: 2017-10-02
Payer: COMMERCIAL

## 2017-10-03 ENCOUNTER — OFFICE VISIT (OUTPATIENT)
Dept: MEDICAL GROUP | Facility: PHYSICIAN GROUP | Age: 22
End: 2017-10-03
Payer: COMMERCIAL

## 2017-10-03 VITALS
HEIGHT: 65 IN | TEMPERATURE: 98.1 F | RESPIRATION RATE: 16 BRPM | OXYGEN SATURATION: 95 % | DIASTOLIC BLOOD PRESSURE: 74 MMHG | WEIGHT: 141 LBS | BODY MASS INDEX: 23.49 KG/M2 | SYSTOLIC BLOOD PRESSURE: 100 MMHG | HEART RATE: 77 BPM

## 2017-10-03 DIAGNOSIS — J02.8 SORE THROAT (VIRAL): ICD-10-CM

## 2017-10-03 DIAGNOSIS — B97.89 SORE THROAT (VIRAL): ICD-10-CM

## 2017-10-03 DIAGNOSIS — Z30.011 ORAL CONTRACEPTION INITIATION: ICD-10-CM

## 2017-10-03 DIAGNOSIS — Z02.89 ENCOUNTER TO OBTAIN EXCUSE FROM WORK: ICD-10-CM

## 2017-10-03 DIAGNOSIS — N80.9 ENDOMETRIOSIS: ICD-10-CM

## 2017-10-03 DIAGNOSIS — J45.20 MILD INTERMITTENT ASTHMA WITHOUT COMPLICATION: ICD-10-CM

## 2017-10-03 DIAGNOSIS — Z23 NEED FOR VACCINATION: ICD-10-CM

## 2017-10-03 PROCEDURE — 99214 OFFICE O/P EST MOD 30 MIN: CPT | Performed by: INTERNAL MEDICINE

## 2017-10-03 RX ORDER — DROSPIRENONE AND ETHINYL ESTRADIOL 0.03MG-3MG
1 KIT ORAL DAILY
Qty: 28 TAB | Refills: 6 | Status: SHIPPED | OUTPATIENT
Start: 2017-10-03 | End: 2018-10-18

## 2017-10-03 NOTE — LETTER
October 3, 2017       Patient: Karol Baca   YOB: 1995   Date of Visit: 10/3/2017          To Whom It May Concern:     It is my medical opinion that Karol Baca return to full duty, no restrictions 10/03/2017.     If you have any questions or concerns, please don't hesitate to call [phone]              Sincerely,              Hayder Rene M.D.  Electronically Signed

## 2017-10-03 NOTE — PROGRESS NOTES
Subjective:   Karol Baca is a 22 y.o. female here today for paper work, oral contraceptives     Pt presented for FMLA paper fill out. She has severe endometriosis.   She cannot go to work, when she is severe pain, she has been evaluated by GYN in the past. She was offered hysterectomy, but pt is so young. She has heavy, painful menstrual cycle. She is open to oral contraceptive. She stopped smoking. No contraindication. FMLA docs completed. Excuse letter for work provided   At previous apt  She had sore throat, and hypertrophy of tonsils with white discharges. It is resolved, no sore throat. She is doing better today.   Asthma stable. She denies wheezing, shortness of breath, leg swelling    Current medicines (including changes today)  Current Outpatient Prescriptions   Medication Sig Dispense Refill   • drospirenone-ethinyl estradiol (NOLAN) 3-0.03 MG per tablet Take 1 Tab by mouth every day. 28 Tab 6     No current facility-administered medications for this visit.      She  has a past medical history of Recurrent major depressive disorder, in partial remission (CMS-[facility]) (9/22/2017); Sore throat (viral) (9/22/2017); and Unspecified asthma. She also has no past medical history of CAD (coronary artery disease); Cancer (CMS-HCC); Congestive heart failure (CMS-HCC); Hypertension; Liver disease; Renal disorder; Seizure disorder (CMS-HCC); Stroke (CMS-HCC); or Type II or unspecified type diabetes mellitus without mention of complication, not stated as uncontrolled.    Current Outpatient Prescriptions   Medication Sig Dispense Refill   • drospirenone-ethinyl estradiol (NOLAN) 3-0.03 MG per tablet Take 1 Tab by mouth every day. 28 Tab 6     No current facility-administered medications for this visit.        Allergies as of 10/03/2017 - Reviewed 10/03/2017   Allergen Reaction Noted   • Sulfa drugs Hives 08/12/2010       Social History     Social History   • Marital status: Single     Spouse name: N/A   • Number of  "children: N/A   • Years of education: N/A     Occupational History   • Not on file.     Social History Main Topics   • Smoking status: Former Smoker     Packs/day: 0.50     Types: Cigarettes     Quit date: 9/15/2017   • Smokeless tobacco: Never Used   • Alcohol use Yes      Comment: occ   • Drug use: No      Comment: smoked marijuana,    • Sexual activity: Yes     Other Topics Concern   • Not on file     Social History Narrative   • No narrative on file        Family History   Problem Relation Age of Onset   • Stroke Maternal Grandmother    • Cancer Maternal Grandfather      lung cancer   • Heart Disease Paternal Grandmother        Past Surgical History:   Procedure Laterality Date   • GYN SURGERY      exploratory lap   • OTHER ORTHOPEDIC SURGERY      frx arm   • WIDE EXCISION MELANOMA, LEG, W/POSS.STSG         ROS   No chest pain, no shortness of breath, no abdominal pain       Objective:     Blood pressure 100/74, pulse 77, temperature 36.7 °C (98.1 °F), resp. rate 16, height 1.651 m (5' 5\"), weight 64 kg (141 lb), last menstrual period 07/24/2017, SpO2 95 %, not currently breastfeeding. Body mass index is 23.46 kg/m².   Physical Exam:  Constitutional: Alert, no distress.  Skin: Warm, dry, good turgor, no rashes in visible areas.  Eye: Equal, round and reactive, conjunctiva clear, lids normal.  ENMT: Lips without lesions, good dentition, oropharynx clear.  Neck: Trachea midline, no masses, no thyromegaly. No cervical or supraclavicular lymphadenopathy  Respiratory: Unlabored respiratory effort,   Psych: Alert and oriented x3, normal affect and mood.        Assessment and Plan:   The following treatment plan was discussed        1. Sore throat (viral)  Resolved,,    2. Endometriosis  3. Oral contraception initiation  4. Encounter to obtain excuse from work  Paper work for work provided. I advised pt OCP. Follow up with GYN or me for PAP smear. Avoid narcotics   No contraindication for OCP at this time. Not a " smoker, no family h/o DVTs   She is doing better   - drospirenone-ethinyl estradiol (NOLAN) 3-0.03 MG per tablet; Take 1 Tab by mouth every day.  Dispense: 28 Tab; Refill: 6      4. Mild intermittent asthma without complication  Stable. Inhaler prn         Followup: Return if symptoms worsen or fail to improve.

## 2017-10-06 ENCOUNTER — TELEPHONE (OUTPATIENT)
Dept: MEDICAL GROUP | Facility: PHYSICIAN GROUP | Age: 22
End: 2017-10-06

## 2017-10-06 NOTE — TELEPHONE ENCOUNTER
Patient called and LVM stating stating that her leave of absence form Question 5 needed to be completed with dates of 9/8-9/11, 9/18-9/25 and she is to return to work on 10/3.    Also received a call from [employer] requesting a call back at [phone] to discuss form.

## 2017-10-06 NOTE — TELEPHONE ENCOUNTER
I spoke with [employer] representative and they confirmed what patient called about earlier.  Forms updated,  initialed changes per [employer]'s directions, faxed back, confirmation received, scanned to media.

## 2017-10-11 ENCOUNTER — OFFICE VISIT (OUTPATIENT)
Dept: MEDICAL GROUP | Facility: PHYSICIAN GROUP | Age: 22
End: 2017-10-11
Payer: COMMERCIAL

## 2017-10-11 VITALS
OXYGEN SATURATION: 97 % | SYSTOLIC BLOOD PRESSURE: 112 MMHG | RESPIRATION RATE: 16 BRPM | HEART RATE: 66 BPM | WEIGHT: 136 LBS | TEMPERATURE: 98.3 F | DIASTOLIC BLOOD PRESSURE: 72 MMHG | BODY MASS INDEX: 22.66 KG/M2 | HEIGHT: 65 IN

## 2017-10-11 DIAGNOSIS — Z02.89 ENCOUNTER TO OBTAIN EXCUSE FROM WORK: ICD-10-CM

## 2017-10-11 DIAGNOSIS — N80.9 ENDOMETRIOSIS: ICD-10-CM

## 2017-10-11 PROCEDURE — 99999 PR NO CHARGE: CPT | Performed by: INTERNAL MEDICINE

## 2017-10-11 NOTE — PROGRESS NOTES
Subjective:   Karol Baca is a 22 y.o. female here today for follow up on FMLA paper work up   Last appointment I filled out the family excuse paper from work. There were couple of mistake on the forms, and dates. Correction provide. She is doing well, she has been able to go to work, she has not tried OCP yet. No flares of endometriosis during this time.     Current medicines (including changes today)  Current Outpatient Prescriptions   Medication Sig Dispense Refill   • drospirenone-ethinyl estradiol (NOLAN) 3-0.03 MG per tablet Take 1 Tab by mouth every day. 28 Tab 6     No current facility-administered medications for this visit.      She  has a past medical history of Recurrent major depressive disorder, in partial remission (CMS-[facility]) (9/22/2017); Sore throat (viral) (9/22/2017); and Unspecified asthma(493.90). She also has no past medical history of CAD (coronary artery disease); Cancer (CMS-HCC); Congestive heart failure (CMS-HCC); Hypertension; Liver disease; Renal disorder; Seizure disorder (CMS-HCC); Stroke (CMS-HCC); or Type II or unspecified type diabetes mellitus without mention of complication, not stated as uncontrolled.    Current Outpatient Prescriptions   Medication Sig Dispense Refill   • drospirenone-ethinyl estradiol (NOLAN) 3-0.03 MG per tablet Take 1 Tab by mouth every day. 28 Tab 6     No current facility-administered medications for this visit.        Allergies as of 10/11/2017 - Reviewed 10/11/2017   Allergen Reaction Noted   • Sulfa drugs Hives 08/12/2010       Social History     Social History   • Marital status: Single     Spouse name: N/A   • Number of children: N/A   • Years of education: N/A     Occupational History   • Not on file.     Social History Main Topics   • Smoking status: Former Smoker     Packs/day: 0.50     Types: Cigarettes     Quit date: 9/15/2017   • Smokeless tobacco: Never Used   • Alcohol use Yes      Comment: occ   • Drug use: No      Comment: smoked  "marijuana,    • Sexual activity: Yes     Other Topics Concern   • Not on file     Social History Narrative   • No narrative on file        Family History   Problem Relation Age of Onset   • Stroke Maternal Grandmother    • Cancer Maternal Grandfather      lung cancer   • Heart Disease Paternal Grandmother        Past Surgical History:   Procedure Laterality Date   • GYN SURGERY      exploratory lap   • OTHER ORTHOPEDIC SURGERY      frx arm   • WIDE EXCISION MELANOMA, LEG, W/POSS.STSG         ROS  No chest pain, no shortness of breath, no abdominal pain       Objective:     Blood pressure 112/72, pulse 66, temperature 36.8 °C (98.3 °F), resp. rate 16, height 1.651 m (5' 5\"), weight 61.7 kg (136 lb), last menstrual period 10/01/2017, SpO2 97 %, not currently breastfeeding. Body mass index is 22.63 kg/m².   Physical Exam:  Constitutional: Alert, no distress.  Skin: Warm, dry, good turgor, no rashes in visible areas.  Respiratory: Unlabored respiratory effort, lungs  Psych: Alert and oriented x3, normal affect and mood.        Assessment and Plan:   The following treatment plan was discussed    1. Encounter to obtain excuse from work  2. Endometriosis  Paper filled out. Encouraged to start OCP       Followup: Return if symptoms worsen or fail to improve.         "

## 2018-04-05 ENCOUNTER — APPOINTMENT (OUTPATIENT)
Dept: OBGYN | Facility: MEDICAL CENTER | Age: 23
End: 2018-04-05
Payer: COMMERCIAL

## 2018-06-09 ENCOUNTER — HOSPITAL ENCOUNTER (OUTPATIENT)
Dept: LAB | Facility: MEDICAL CENTER | Age: 23
End: 2018-06-09
Attending: PATHOLOGY
Payer: COMMERCIAL

## 2018-06-09 LAB — HCG SERPL QL: NEGATIVE

## 2018-06-09 PROCEDURE — 84703 CHORIONIC GONADOTROPIN ASSAY: CPT

## 2018-06-09 PROCEDURE — 36415 COLL VENOUS BLD VENIPUNCTURE: CPT

## 2018-07-25 ENCOUNTER — HOSPITAL ENCOUNTER (OUTPATIENT)
Facility: MEDICAL CENTER | Age: 23
End: 2018-07-25
Attending: OBSTETRICS & GYNECOLOGY
Payer: COMMERCIAL

## 2018-07-25 ENCOUNTER — HOSPITAL ENCOUNTER (OUTPATIENT)
Dept: LAB | Facility: MEDICAL CENTER | Age: 23
End: 2018-07-25
Attending: OBSTETRICS & GYNECOLOGY
Payer: COMMERCIAL

## 2018-07-25 ENCOUNTER — GYNECOLOGY VISIT (OUTPATIENT)
Dept: OBGYN | Facility: MEDICAL CENTER | Age: 23
End: 2018-07-25
Payer: COMMERCIAL

## 2018-07-25 VITALS
HEIGHT: 65 IN | DIASTOLIC BLOOD PRESSURE: 66 MMHG | WEIGHT: 134 LBS | BODY MASS INDEX: 22.33 KG/M2 | SYSTOLIC BLOOD PRESSURE: 102 MMHG

## 2018-07-25 DIAGNOSIS — N93.9 ABNORMAL UTERINE BLEEDING: ICD-10-CM

## 2018-07-25 DIAGNOSIS — R10.2 PELVIC PAIN: ICD-10-CM

## 2018-07-25 DIAGNOSIS — Z12.4 SCREENING FOR CERVICAL CANCER: ICD-10-CM

## 2018-07-25 DIAGNOSIS — Z11.51 SPECIAL SCREENING EXAMINATION FOR HUMAN PAPILLOMAVIRUS (HPV): ICD-10-CM

## 2018-07-25 LAB
DHEA-S SERPL-MCNC: 242 UG/DL (ref 148–407)
PROGEST SERPL-MCNC: 8.47 NG/ML
TSH SERPL DL<=0.005 MIU/L-ACNC: 0.75 UIU/ML (ref 0.38–5.33)

## 2018-07-25 PROCEDURE — 87624 HPV HI-RISK TYP POOLED RSLT: CPT

## 2018-07-25 PROCEDURE — 83498 ASY HYDROXYPROGESTERONE 17-D: CPT

## 2018-07-25 PROCEDURE — 84443 ASSAY THYROID STIM HORMONE: CPT

## 2018-07-25 PROCEDURE — 88175 CYTOPATH C/V AUTO FLUID REDO: CPT

## 2018-07-25 PROCEDURE — 99204 OFFICE O/P NEW MOD 45 MIN: CPT | Performed by: OBSTETRICS & GYNECOLOGY

## 2018-07-25 PROCEDURE — 82627 DEHYDROEPIANDROSTERONE: CPT

## 2018-07-25 PROCEDURE — 87591 N.GONORRHOEAE DNA AMP PROB: CPT

## 2018-07-25 PROCEDURE — 84144 ASSAY OF PROGESTERONE: CPT

## 2018-07-25 PROCEDURE — 36415 COLL VENOUS BLD VENIPUNCTURE: CPT

## 2018-07-25 PROCEDURE — 87491 CHLMYD TRACH DNA AMP PROBE: CPT

## 2018-07-25 RX ORDER — IBUPROFEN 800 MG/1
800 TABLET ORAL EVERY 8 HOURS PRN
Qty: 30 TAB | Refills: 1 | Status: SHIPPED | OUTPATIENT
Start: 2018-07-25 | End: 2019-11-04

## 2018-07-25 NOTE — PROGRESS NOTES
GYN Consult    CC: pelvic pain, irregular periods    HPI: Karol Baca is a 23 y.o.  c/o pelvic pain and irregular periods.    Reports her periods were regular and monthly prior to about 2yrs ago.  Then she starting skipping periods, at times going up to almost 3 mos without a period.  Periods now are light, lasting 1-2 days.  Reports they used to last up to 5 days.    Last 3 months pt did have regular, monthly periods although they only lasted 1-2days.  Also reports pelvic pain that is all the time.  Much worse with sex (especially when she is on top), deep pelvic pain, not vaginal/insertional pain.  Sometimes is worse with her cycles but not always.  Can be bilateral, often midline.  Nothing else really changes it. Has been given percocet for it in the past, recently takes ibuprofen 400mg at a time which helps some.  Denies dysuria.  +constipation, no blood in stool.  Does feel she has bloating at times.  Pt reports she saw Dr. Gallardo at [facility] for this problem and had a diagnostic laparoscopy - reports she was told there might be some early endometriosis but no cysts or anything.  Not sure what other labs she had done for evaluation of this.  Plan was made to start contraception and she got DMPA x1 which she did not like.  That was approximately 1 yr ago.  Since that DMPA pt reprots she has been trying to get pregnant. Having sex daily.  Partner has fathered children before.  Pt got pregnant at age 19 and reports a spontaneous miscarriage at home, was never seen for this.      ROS:  constitutional: denies fevers, general concerns, fatigue  CV: denies chest pain, palpitations, edema  Resp: denies shortness of breath, cough  Breasts: denies discharge, masses  GI: denies abd pain, reports some bloating with occasional N/V (not associated with any particular food) + constipation, denies blood in stool  : see HPI denies urinary complaints  Neuro: denies hx of migraines w/ aura  Endo: denies  significant weight changes, temperature intolerance, denies hotflashes/nightsweats  Heme/lymph: denies easy bleeding/bruising, denies swollen glands  Psych: reports depression, hx of suicide attempt in the distant past, denies SI currently, has appt with PCP on Friday to further discuss  Allergy: denies concerns  Skin: +acne    OB History    Para Term  AB Living   1       1     SAB TAB Ectopic Molar Multiple Live Births   1                # Outcome Date GA Lbr Obdulio/2nd Weight Sex Delivery Anes PTL Lv   1 SAB                   GYN Hx:   Menarche at 15, see HPI for menstrual hx  Denies hx of STIs ( treated after exposure to either NG or CT once but never diagnosed)  No paps     Past Medical History:   Diagnosis Date   • Migraine    • Recurrent major depressive disorder, in partial remission (HCC) 2017   • Unspecified asthma(493.90)        Past Surgical History:   Procedure Laterality Date   • GYN SURGERY      exploratory lap   • OTHER ORTHOPEDIC SURGERY      frx arm   • WIDE EXCISION MELANOMA, LEG, W/POSS.STSG         Medications:   Current Outpatient Prescriptions Ordered in [facility]   Medication Sig Dispense Refill   • ibuprofen (MOTRIN) 800 MG Tab Take 1 Tab by mouth every 8 hours as needed. 30 Tab 1   • drospirenone-ethinyl estradiol (NOLAN) 3-0.03 MG per tablet Take 1 Tab by mouth every day. 28 Tab 6     No current Epic-ordered facility-administered medications on file.        Allergies: Sulfa drugs    Social History     Social History   • Marital status: Single     Spouse name: N/A   • Number of children: N/A   • Years of education: N/A     Social History Main Topics   • Smoking status: Current Some Day Smoker     Packs/day: 0.50     Types: Cigarettes     Last attempt to quit: 9/15/2017   • Smokeless tobacco: Never Used   • Alcohol use Yes      Comment: occ   • Drug use: No      Comment: smoked marijuana,    • Sexual activity: Yes   No current drug use  +cigarettes but only occasionally right  "now      Family History   Problem Relation Age of Onset   • Stroke Maternal Grandmother    • Cancer Maternal Grandfather         lung cancer   • Heart Disease Paternal Grandmother      Denies hx of GI/GYN/breast cancers    Physical Exam:  /66   Ht 1.651 m (5' 5\")   Wt 60.8 kg (134 lb)   LMP 2018   Breastfeeding? No   BMI 22.30 kg/m²   gen: AAO, NAD, affect appropriate  Neck: non-tender, no masses, no thyromegaly/nodules appreciated  CV: RRR; no LE edema  resp: ctab  abd: soft, NT, ND, no masses, no organomegaly, no hernias  : NEFG, normal urethral meatus, normal anus/perineum, normal vagina and cervix.  Uterus small, anteverted, no adnexal masses/tenderness; bladder nontender.  Mildly tender around posterior vagina/pelvic floor - not specific to uterosacral ligaments  Skin: warm/dry, no lesions; +acne    A/P: 23 y.o.  with pelvic pain, abnormal uterine bleeding  1. Screening for cervical cancer  THINPREP PAP W/HPV AND CTNG   2. Special screening examination for human papillomavirus (HPV)  THINPREP PAP W/HPV AND CTNG   3. Abnormal uterine bleeding  TSH    DHEA SULFATE    17-OH PROGESTERONE    PROGESTERONE, SERUM    TESTOSTERONE, FREE AND TOTAL   4. Pelvic pain  US-GYN-PELVIS TRANSVAGINAL     AUB - resolved the last 3 mos; prior ot that was within 6 mos of DMPA use which likely to affect although pt reports was irregular prior to the DMPA use.  Will check testosterone/TSH/PRL/DHEA-S/17-hydroxyprogesterone as well as day 21 progesterone to evaluate for ovulation (although last 3 mos ovulatory by hx of regular menses)    Pelvic pain: sounds c/w endometriosis - will obtain op report from prior laparoscopy; discussed that typically recommend cycle control for this but those options also function as contraceptives and pt desires pregnancy.  Will try ibuprofen 800mg prn pain for now.  Also will get TVUS to re-evaluate ovaries (normal exam today).  Also discussed importance of avoiding constipation, " recommend colace/miralax OTC for this.      Desire for pregnancy:  Advised to start PNV or MVI (something with folic acid) now.  UPT today neg.  Progesterone day 21 with labs to evaluate for ovulation; discussed if menses regular, typically wait 1yr of trying prior to initiating additional workup for infertility.  Could try OPK as well.      Record review and labs/imaging as above then return to clinic in 1-2 mos for f/u    Maia Reyez MD  [facility], Women's Health

## 2018-07-26 DIAGNOSIS — Z12.4 SCREENING FOR CERVICAL CANCER: ICD-10-CM

## 2018-07-26 DIAGNOSIS — Z11.51 SPECIAL SCREENING EXAMINATION FOR HUMAN PAPILLOMAVIRUS (HPV): ICD-10-CM

## 2018-07-27 LAB
C TRACH DNA GENITAL QL NAA+PROBE: NEGATIVE
CYTOLOGY REG CYTOL: ABNORMAL
HPV HR 12 DNA CVX QL NAA+PROBE: POSITIVE
HPV16 DNA SPEC QL NAA+PROBE: NEGATIVE
HPV18 DNA SPEC QL NAA+PROBE: NEGATIVE
N GONORRHOEA DNA GENITAL QL NAA+PROBE: NEGATIVE
SPECIMEN SOURCE: ABNORMAL
SPECIMEN SOURCE: ABNORMAL

## 2018-07-29 LAB — 17OHP SERPL-MCNC: 207.95 NG/DL

## 2018-08-07 ENCOUNTER — HOSPITAL ENCOUNTER (OUTPATIENT)
Dept: RADIOLOGY | Facility: MEDICAL CENTER | Age: 23
End: 2018-08-07
Attending: OBSTETRICS & GYNECOLOGY
Payer: COMMERCIAL

## 2018-08-07 DIAGNOSIS — R10.2 PELVIC PAIN: ICD-10-CM

## 2018-08-07 PROCEDURE — 76830 TRANSVAGINAL US NON-OB: CPT

## 2018-10-18 ENCOUNTER — OFFICE VISIT (OUTPATIENT)
Dept: MEDICAL GROUP | Facility: PHYSICIAN GROUP | Age: 23
End: 2018-10-18
Payer: COMMERCIAL

## 2018-10-18 VITALS
SYSTOLIC BLOOD PRESSURE: 100 MMHG | BODY MASS INDEX: 22.66 KG/M2 | RESPIRATION RATE: 14 BRPM | OXYGEN SATURATION: 99 % | WEIGHT: 136 LBS | HEART RATE: 54 BPM | HEIGHT: 65 IN | DIASTOLIC BLOOD PRESSURE: 54 MMHG | TEMPERATURE: 98.2 F

## 2018-10-18 DIAGNOSIS — F33.41 RECURRENT MAJOR DEPRESSIVE DISORDER, IN PARTIAL REMISSION (HCC): ICD-10-CM

## 2018-10-18 PROBLEM — Z02.89 ENCOUNTER TO OBTAIN EXCUSE FROM WORK: Status: RESOLVED | Noted: 2017-10-03 | Resolved: 2018-10-18

## 2018-10-18 PROCEDURE — 99214 OFFICE O/P EST MOD 30 MIN: CPT | Performed by: INTERNAL MEDICINE

## 2018-10-18 RX ORDER — PAROXETINE HYDROCHLORIDE 20 MG/1
20 TABLET, FILM COATED ORAL DAILY
Qty: 30 TAB | Refills: 3 | Status: SHIPPED | OUTPATIENT
Start: 2018-10-18 | End: 2019-11-04

## 2018-10-18 ASSESSMENT — PATIENT HEALTH QUESTIONNAIRE - PHQ9
8. MOVING OR SPEAKING SO SLOWLY THAT OTHER PEOPLE COULD HAVE NOTICED. OR THE OPPOSITE, BEING SO FIGETY OR RESTLESS THAT YOU HAVE BEEN MOVING AROUND A LOT MORE THAN USUAL: MORE THAN HALF THE DAYS
SUM OF ALL RESPONSES TO PHQ QUESTIONS 1-9: 25
4. FEELING TIRED OR HAVING LITTLE ENERGY: NEARLY EVERY DAY
SUM OF ALL RESPONSES TO PHQ9 QUESTIONS 1 AND 2: 6
6. FEELING BAD ABOUT YOURSELF - OR THAT YOU ARE A FAILURE OR HAVE LET YOURSELF OR YOUR FAMILY DOWN: NEARLY EVERY DAY
7. TROUBLE CONCENTRATING ON THINGS, SUCH AS READING THE NEWSPAPER OR WATCHING TELEVISION: NEARLY EVERY DAY
9. THOUGHTS THAT YOU WOULD BE BETTER OFF DEAD, OR OF HURTING YOURSELF: NEARLY EVERY DAY
1. LITTLE INTEREST OR PLEASURE IN DOING THINGS: NEARLY EVERY DAY
5. POOR APPETITE OR OVEREATING: MORE THAN HALF THE DAYS
2. FEELING DOWN, DEPRESSED, IRRITABLE, OR HOPELESS: NEARLY EVERY DAY
3. TROUBLE FALLING OR STAYING ASLEEP OR SLEEPING TOO MUCH: NEARLY EVERY DAY

## 2018-10-20 NOTE — PROGRESS NOTES
Subjective:   Karol Baca is a 23 y.o. female here today for depression and anxiety     23-year-old female past medical history of migraine headache, endometriosis, depression presented complaining of worsening depression. She tells me that lately she does not feel motivated. She is down to herself. She refuses sometimes to go to work because she feels depressed. She cries a lot for no reason. No suicidal thoughts. She has been on paxil in the past when she was on high school. She tells me that at that time she had family problems and that was a trigger for anxiety and depression. Her boyfriend is present and very supportive. She denies alcohol, drug abuse, marijuana use regularly. She would like to restart medication for depression   She denies being bullied on high school or ever been abused.     Current medicines (including changes today)  Current Outpatient Prescriptions   Medication Sig Dispense Refill   • PARoxetine (PAXIL) 20 MG Tab Take 1 Tab by mouth every day. 30 Tab 3   • ibuprofen (MOTRIN) 800 MG Tab Take 1 Tab by mouth every 8 hours as needed. 30 Tab 1     No current facility-administered medications for this visit.      She  has a past medical history of Migraine; Recurrent major depressive disorder, in partial remission (HCC) (9/22/2017); and Unspecified asthma(493.90). She also has no past medical history of CAD (coronary artery disease); Cancer (HCC); Congestive heart failure (HCC); Hypertension; Liver disease; Renal disorder; Seizure disorder (HCC); Stroke (HCC); or Type II or unspecified type diabetes mellitus without mention of complication, not stated as uncontrolled.    Current Outpatient Prescriptions   Medication Sig Dispense Refill   • PARoxetine (PAXIL) 20 MG Tab Take 1 Tab by mouth every day. 30 Tab 3   • ibuprofen (MOTRIN) 800 MG Tab Take 1 Tab by mouth every 8 hours as needed. 30 Tab 1     No current facility-administered medications for this visit.        Allergies as of 10/18/2018  "- Reviewed 10/18/2018   Allergen Reaction Noted   • Sulfa drugs Hives 08/12/2010       Social History     Social History   • Marital status: Single     Spouse name: N/A   • Number of children: N/A   • Years of education: N/A     Occupational History   • Not on file.     Social History Main Topics   • Smoking status: Former Smoker     Packs/day: 0.50     Types: Cigarettes     Quit date: 9/15/2017   • Smokeless tobacco: Never Used   • Alcohol use Yes      Comment: occ   • Drug use: No      Comment: smoked marijuana,    • Sexual activity: Yes     Other Topics Concern   • Not on file     Social History Narrative   • No narrative on file        Family History   Problem Relation Age of Onset   • Stroke Maternal Grandmother    • Cancer Maternal Grandfather         lung cancer   • Heart Disease Paternal Grandmother        Past Surgical History:   Procedure Laterality Date   • GYN SURGERY      exploratory lap   • OTHER ORTHOPEDIC SURGERY      frx arm   • WIDE EXCISION MELANOMA, LEG, W/POSS.STSG         ROS   No chest pain, no shortness of breath, no abdominal pain,see HPI        Objective:     Blood pressure 100/54, pulse (!) 54, temperature 36.8 °C (98.2 °F), temperature source Temporal, resp. rate 14, height 1.651 m (5' 5\"), weight 61.7 kg (136 lb), last menstrual period 10/11/2018, SpO2 99 %, not currently breastfeeding. Body mass index is 22.63 kg/m².   Physical Exam:  Constitutional: Alert, no distress.  Skin: Warm, dry, good turgor, no rashes in visible areas.  Eye: Equal, round and reactive, , lids normal.  ENMT: Lips without lesions, good dentition, oropharynx clear.  Respiratory: Unlabored respiratory effort,   Psych: Alert and oriented x3, normal affect and mood. Not manical. Answer questions properly       Assessment and Plan:   The following treatment plan was discussed    1. Recurrent major depressive disorder, in partial remission (HCC)  She is very pleasant and would like to work and feel better. Counseling " provided. I will refer pt for behavioral therapy. I will start her back on paxil.   No inpatient criteria at this time.  Follow up in one month with Kaelyn Ribeiro   - REFERRAL TO BEHAVIORAL HEALTH  - PARoxetine (PAXIL) 20 MG Tab; Take 1 Tab by mouth every day.  Dispense: 30 Tab; Refill: 3      Followup: Return in about 4 weeks (around 11/15/2018), or if symptoms worsen or fail to improve, for Long.

## 2018-10-28 ENCOUNTER — APPOINTMENT (OUTPATIENT)
Dept: RADIOLOGY | Facility: MEDICAL CENTER | Age: 23
End: 2018-10-28
Attending: EMERGENCY MEDICINE
Payer: COMMERCIAL

## 2018-10-28 ENCOUNTER — HOSPITAL ENCOUNTER (EMERGENCY)
Facility: MEDICAL CENTER | Age: 23
End: 2018-10-29
Attending: EMERGENCY MEDICINE
Payer: COMMERCIAL

## 2018-10-28 DIAGNOSIS — N30.90 CYSTITIS: ICD-10-CM

## 2018-10-28 LAB
ALBUMIN SERPL BCP-MCNC: 4.1 G/DL (ref 3.2–4.9)
ALBUMIN/GLOB SERPL: 1.2 G/DL
ALP SERPL-CCNC: 56 U/L (ref 30–99)
ALT SERPL-CCNC: 12 U/L (ref 2–50)
ANION GAP SERPL CALC-SCNC: 8 MMOL/L (ref 0–11.9)
APPEARANCE UR: ABNORMAL
AST SERPL-CCNC: 13 U/L (ref 12–45)
BACTERIA #/AREA URNS HPF: ABNORMAL /HPF
BASOPHILS # BLD AUTO: 0.5 % (ref 0–1.8)
BASOPHILS # BLD: 0.08 K/UL (ref 0–0.12)
BILIRUB SERPL-MCNC: 0.8 MG/DL (ref 0.1–1.5)
BILIRUB UR QL STRIP.AUTO: ABNORMAL
BUN SERPL-MCNC: 12 MG/DL (ref 8–22)
CALCIUM SERPL-MCNC: 9.7 MG/DL (ref 8.5–10.5)
CHLORIDE SERPL-SCNC: 103 MMOL/L (ref 96–112)
CO2 SERPL-SCNC: 23 MMOL/L (ref 20–33)
COLOR UR: ABNORMAL
CREAT SERPL-MCNC: 1.12 MG/DL (ref 0.5–1.4)
EOSINOPHIL # BLD AUTO: 0.02 K/UL (ref 0–0.51)
EOSINOPHIL NFR BLD: 0.1 % (ref 0–6.9)
EPI CELLS #/AREA URNS HPF: ABNORMAL /HPF
ERYTHROCYTE [DISTWIDTH] IN BLOOD BY AUTOMATED COUNT: 39.5 FL (ref 35.9–50)
GLOBULIN SER CALC-MCNC: 3.3 G/DL (ref 1.9–3.5)
GLUCOSE SERPL-MCNC: 157 MG/DL (ref 65–99)
GLUCOSE UR STRIP.AUTO-MCNC: NEGATIVE MG/DL
HCG UR QL: NEGATIVE
HCT VFR BLD AUTO: 40.3 % (ref 37–47)
HGB BLD-MCNC: 13.2 G/DL (ref 12–16)
IMM GRANULOCYTES # BLD AUTO: 0.07 K/UL (ref 0–0.11)
IMM GRANULOCYTES NFR BLD AUTO: 0.4 % (ref 0–0.9)
KETONES UR STRIP.AUTO-MCNC: 80 MG/DL
LEUKOCYTE ESTERASE UR QL STRIP.AUTO: ABNORMAL
LYMPHOCYTES # BLD AUTO: 1.47 K/UL (ref 1–4.8)
LYMPHOCYTES NFR BLD: 8.6 % (ref 22–41)
MCH RBC QN AUTO: 29.7 PG (ref 27–33)
MCHC RBC AUTO-ENTMCNC: 32.8 G/DL (ref 33.6–35)
MCV RBC AUTO: 90.8 FL (ref 81.4–97.8)
MICRO URNS: ABNORMAL
MONOCYTES # BLD AUTO: 1.3 K/UL (ref 0–0.85)
MONOCYTES NFR BLD AUTO: 7.6 % (ref 0–13.4)
NEUTROPHILS # BLD AUTO: 14.19 K/UL (ref 2–7.15)
NEUTROPHILS NFR BLD: 82.8 % (ref 44–72)
NITRITE UR QL STRIP.AUTO: POSITIVE
NRBC # BLD AUTO: 0 K/UL
NRBC BLD-RTO: 0 /100 WBC
PH UR STRIP.AUTO: 5 [PH]
PLATELET # BLD AUTO: 234 K/UL (ref 164–446)
PMV BLD AUTO: 10.7 FL (ref 9–12.9)
POTASSIUM SERPL-SCNC: 3.3 MMOL/L (ref 3.6–5.5)
PROT SERPL-MCNC: 7.4 G/DL (ref 6–8.2)
PROT UR QL STRIP: 300 MG/DL
RBC # BLD AUTO: 4.44 M/UL (ref 4.2–5.4)
RBC # URNS HPF: ABNORMAL /HPF
RBC UR QL AUTO: ABNORMAL
SODIUM SERPL-SCNC: 134 MMOL/L (ref 135–145)
SP GR UR REFRACTOMETRY: 1.03
SP GR UR STRIP.AUTO: 1.03
UROBILINOGEN UR STRIP.AUTO-MCNC: 1 MG/DL
WBC # BLD AUTO: 17.1 K/UL (ref 4.8–10.8)
WBC #/AREA URNS HPF: ABNORMAL /HPF

## 2018-10-28 PROCEDURE — 96375 TX/PRO/DX INJ NEW DRUG ADDON: CPT

## 2018-10-28 PROCEDURE — 36415 COLL VENOUS BLD VENIPUNCTURE: CPT

## 2018-10-28 PROCEDURE — 85025 COMPLETE CBC W/AUTO DIFF WBC: CPT

## 2018-10-28 PROCEDURE — 99285 EMERGENCY DEPT VISIT HI MDM: CPT

## 2018-10-28 PROCEDURE — 81025 URINE PREGNANCY TEST: CPT

## 2018-10-28 PROCEDURE — 74176 CT ABD & PELVIS W/O CONTRAST: CPT

## 2018-10-28 PROCEDURE — 81001 URINALYSIS AUTO W/SCOPE: CPT

## 2018-10-28 PROCEDURE — 87077 CULTURE AEROBIC IDENTIFY: CPT

## 2018-10-28 PROCEDURE — 80053 COMPREHEN METABOLIC PANEL: CPT

## 2018-10-28 PROCEDURE — 96365 THER/PROPH/DIAG IV INF INIT: CPT

## 2018-10-28 PROCEDURE — 87086 URINE CULTURE/COLONY COUNT: CPT

## 2018-10-28 PROCEDURE — 700111 HCHG RX REV CODE 636 W/ 250 OVERRIDE (IP): Performed by: EMERGENCY MEDICINE

## 2018-10-28 PROCEDURE — 700105 HCHG RX REV CODE 258: Performed by: EMERGENCY MEDICINE

## 2018-10-28 PROCEDURE — 87186 SC STD MICRODIL/AGAR DIL: CPT

## 2018-10-28 RX ORDER — ONDANSETRON 2 MG/ML
4 INJECTION INTRAMUSCULAR; INTRAVENOUS ONCE
Status: COMPLETED | OUTPATIENT
Start: 2018-10-28 | End: 2018-10-28

## 2018-10-28 RX ORDER — MORPHINE SULFATE 4 MG/ML
4 INJECTION, SOLUTION INTRAMUSCULAR; INTRAVENOUS ONCE
Status: COMPLETED | OUTPATIENT
Start: 2018-10-28 | End: 2018-10-28

## 2018-10-28 RX ORDER — SODIUM CHLORIDE 9 MG/ML
1000 INJECTION, SOLUTION INTRAVENOUS ONCE
Status: COMPLETED | OUTPATIENT
Start: 2018-10-29 | End: 2018-10-29

## 2018-10-28 RX ADMIN — ONDANSETRON 4 MG: 2 INJECTION INTRAMUSCULAR; INTRAVENOUS at 23:09

## 2018-10-28 RX ADMIN — CEFTRIAXONE SODIUM 2 G: 2 INJECTION, POWDER, FOR SOLUTION INTRAMUSCULAR; INTRAVENOUS at 23:09

## 2018-10-28 RX ADMIN — MORPHINE SULFATE 4 MG: 4 INJECTION INTRAVENOUS at 23:09

## 2018-10-28 ASSESSMENT — LIFESTYLE VARIABLES: DO YOU DRINK ALCOHOL: NO

## 2018-10-28 ASSESSMENT — PAIN SCALES - GENERAL: PAINLEVEL_OUTOF10: 6

## 2018-10-29 VITALS
OXYGEN SATURATION: 97 % | HEART RATE: 53 BPM | HEIGHT: 65 IN | SYSTOLIC BLOOD PRESSURE: 119 MMHG | DIASTOLIC BLOOD PRESSURE: 66 MMHG | RESPIRATION RATE: 18 BRPM | WEIGHT: 132.06 LBS | BODY MASS INDEX: 22 KG/M2 | TEMPERATURE: 98.8 F

## 2018-10-29 PROCEDURE — 700111 HCHG RX REV CODE 636 W/ 250 OVERRIDE (IP): Performed by: EMERGENCY MEDICINE

## 2018-10-29 PROCEDURE — A9270 NON-COVERED ITEM OR SERVICE: HCPCS | Performed by: EMERGENCY MEDICINE

## 2018-10-29 PROCEDURE — 700102 HCHG RX REV CODE 250 W/ 637 OVERRIDE(OP): Performed by: EMERGENCY MEDICINE

## 2018-10-29 PROCEDURE — 700105 HCHG RX REV CODE 258: Performed by: EMERGENCY MEDICINE

## 2018-10-29 PROCEDURE — 96376 TX/PRO/DX INJ SAME DRUG ADON: CPT

## 2018-10-29 RX ORDER — POTASSIUM CHLORIDE 20 MEQ/1
40 TABLET, EXTENDED RELEASE ORAL ONCE
Status: COMPLETED | OUTPATIENT
Start: 2018-10-29 | End: 2018-10-29

## 2018-10-29 RX ORDER — CEPHALEXIN 500 MG/1
500 CAPSULE ORAL 4 TIMES DAILY
Qty: 40 CAP | Refills: 0 | Status: SHIPPED | OUTPATIENT
Start: 2018-10-29 | End: 2018-11-08

## 2018-10-29 RX ORDER — KETOROLAC TROMETHAMINE 30 MG/ML
30 INJECTION, SOLUTION INTRAMUSCULAR; INTRAVENOUS ONCE
Status: COMPLETED | OUTPATIENT
Start: 2018-10-29 | End: 2018-10-29

## 2018-10-29 RX ORDER — PHENAZOPYRIDINE HYDROCHLORIDE 200 MG/1
200 TABLET, FILM COATED ORAL 3 TIMES DAILY PRN
Qty: 6 TAB | Refills: 0 | Status: SHIPPED | OUTPATIENT
Start: 2018-10-29 | End: 2019-11-04

## 2018-10-29 RX ORDER — MORPHINE SULFATE 4 MG/ML
4 INJECTION, SOLUTION INTRAMUSCULAR; INTRAVENOUS ONCE
Status: COMPLETED | OUTPATIENT
Start: 2018-10-29 | End: 2018-10-29

## 2018-10-29 RX ORDER — ONDANSETRON 4 MG/1
4 TABLET, ORALLY DISINTEGRATING ORAL EVERY 6 HOURS PRN
Qty: 10 TAB | Refills: 0 | Status: SHIPPED | OUTPATIENT
Start: 2018-10-29 | End: 2019-11-04

## 2018-10-29 RX ADMIN — MORPHINE SULFATE 4 MG: 4 INJECTION INTRAVENOUS at 00:39

## 2018-10-29 RX ADMIN — SODIUM CHLORIDE 1000 ML: 9 INJECTION, SOLUTION INTRAVENOUS at 00:00

## 2018-10-29 RX ADMIN — POTASSIUM CHLORIDE 40 MEQ: 1500 TABLET, EXTENDED RELEASE ORAL at 00:42

## 2018-10-29 RX ADMIN — KETOROLAC TROMETHAMINE 30 MG: 30 INJECTION, SOLUTION INTRAMUSCULAR at 00:42

## 2018-10-29 NOTE — ED NOTES
IV inserted, saline lock, blood drawn and sent to lab with labels.  Medication administered, see eMAR.  Pt placed on full cardiac monitor.

## 2018-10-29 NOTE — ED NOTES
Pt ambulated to ED 64 with steady gait. Pt alert and oriented x4, gcs 15, resp even and nl. Pt assisted with changed into gown. Pt provided warm blanket for comfort. Call light within reach, instructed how to use call light. Bed locked in low position. Chart up for ERP. Will continue to monitor.

## 2018-10-29 NOTE — ED NOTES
Pt provided discharge instructions, pt verbalized understanding of discharge instructions. Pt alert and oriented x4, gcs15, VSS, resp even and nl. IV catheter removed, catheter intact, pressure and guaze applied. Pt provided RX x3. Pt verbalized understanding of RX. Pt ambulates to exit with steady gait.

## 2018-10-29 NOTE — ED PROVIDER NOTES
ED Provider Note    Scribed for Chris Sterling M.D. by Berna Lorenz. 10/28/2018, 10:54 PM.    Primary care provider: Hayder Rene M.D.  Means of arrival: walk-in  History obtained from: patient  History limited by: none    CHIEF COMPLAINT  Chief Complaint   Patient presents with   • Urinary Frequency   • Flank Pain   • Vomiting       HPI  Karol Baca is a 23 y.o. female who presents to the Emergency Department with right flank pain onset 2 days ago. The pain is sharp and radiates into her right abdomen. She endorses dysuria, urinary urgency, and difficulty urinating. She further reports chills, diaphoresis, and nausea. She vomited two days ago. She has tried Ibuprofen 800 mg, however, it did not improve her pain. Patient endorses that she had an STI years ago that was fully treated and a UTI a long time ago. Patient denies fevers, chest pain, shortness of breath, vaginal discharge, concern for STI, or history of kidney stones.    REVIEW OF SYSTEMS  Pertinent positives include flank pain, difficulty urinating, chills, diaphoresis, vomiting. Pertinent negatives include no vaginal discharge. As above, all other systems reviewed and are negative.   See HPI for further details.     PAST MEDICAL HISTORY   has a past medical history of Migraine; Recurrent major depressive disorder, in partial remission (HCC) (9/22/2017); and Unspecified asthma(493.90).    SURGICAL HISTORY   has a past surgical history that includes other orthopedic surgery; gyn surgery; and wide excision melanoma, leg, w/poss.stsg.    SOCIAL HISTORY  Social History   Substance Use Topics   • Smoking status: Former Smoker     Packs/day: 0.50     Types: Cigarettes     Quit date: 9/15/2017   • Smokeless tobacco: Never Used   • Alcohol use Yes      Comment: occ      History   Drug Use No     Comment: smoked marijuana,        FAMILY HISTORY  Family History   Problem Relation Age of Onset   • Stroke Maternal Grandmother    • Cancer Maternal Grandfather      "    lung cancer   • Heart Disease Paternal Grandmother        CURRENT MEDICATIONS  Home Medications     Reviewed by Deloris Blanco R.N. (Registered Nurse) on 10/28/18 at 2217  Med List Status: Partial   Medication Last Dose Status   ibuprofen (MOTRIN) 800 MG Tab  Active   PARoxetine (PAXIL) 20 MG Tab  Active                ALLERGIES  Allergies   Allergen Reactions   • Sulfa Drugs Hives       PHYSICAL EXAM  VITAL SIGNS: /66   Pulse 97   Temp 37.1 °C (98.8 °F)   Resp 18   Ht 1.651 m (5' 5\")   Wt 59.9 kg (132 lb 0.9 oz)   LMP 09/11/2018   SpO2 99%   BMI 21.98 kg/m²   Vitals reviewed.  Constitutional: Alert in no apparent distress.  HENT: No signs of trauma, Bilateral external ears normal, Nose normal. dry mucous membranes.  Eyes: Pupils are equal and reactive, Conjunctiva normal, Non-icteric.   Neck: Normal range of motion, No tenderness, Supple, No stridor.   Lymphatic: No lymphadenopathy noted.   Cardiovascular: Regular rate and rhythm, no murmurs.   Thorax & Lungs: Normal breath sounds, No respiratory distress, No wheezing, No chest tenderness.   Abdomen: Bowel sounds normal, Soft, Right sided abdominal tenderness to palpation without peritoneal signs, No masses, No pulsatile masses.   Skin: Warm, Dry, No erythema, No rash.   Back: Normal alignment. Right CVAT.   Extremities: Intact distal pulses, No edema, No tenderness, No cyanosis  Musculoskeletal: Good range of motion in all major joints. No major deformities noted.   Neurologic: Alert, Normal motor function, Normal sensory function, No focal deficits noted.   Psychiatric: Affect normal, Judgment normal, Mood normal.     DIAGNOSTIC STUDIES / PROCEDURES    LABS  Labs Reviewed   URINALYSIS,CULTURE IF INDICATED - Abnormal; Notable for the following:        Result Value    Character Turbid (*)     Ketones 80 (*)     Protein 300 (*)     Bilirubin Small (*)     Nitrite Positive (*)     Leukocyte Esterase Moderate (*)     Occult Blood Large (*)     " All other components within normal limits   URINE MICROSCOPIC (W/UA) - Abnormal; Notable for the following:     -150 (*)     RBC 5-10 (*)     Bacteria Moderate (*)     Epithelial Cells Moderate (*)     All other components within normal limits   CBC WITH DIFFERENTIAL - Abnormal; Notable for the following:     WBC 17.1 (*)     MCHC 32.8 (*)     Neutrophils-Polys 82.80 (*)     Lymphocytes 8.60 (*)     Neutrophils (Absolute) 14.19 (*)     Monos (Absolute) 1.30 (*)     All other components within normal limits   COMP METABOLIC PANEL - Abnormal; Notable for the following:     Sodium 134 (*)     Potassium 3.3 (*)     Glucose 157 (*)     All other components within normal limits   HCG QUALITATIVE UR   REFRACTOMETER SG   URINE CULTURE(NEW)   ESTIMATED GFR      All labs reviewed by me.    RADIOLOGY  CT-ABDOMEN-PELVIS W/O   Final Result         1.  Hazy right perinephric fat stranding and mild right proximal ureteral prominence. No obstructing stone is identified. Could represent changes related to recently passed stone or urinary tract infection, correlate with urinalysis.   2.  Hepatomegaly        The radiologist's interpretation of all radiological studies have been reviewed by me.    COURSE & MEDICAL DECISION MAKING  Nursing notes, VS, PMSFHx reviewed in chart.  Differential diagnoses include but not limited to: Pyelonephritis, nephrolithiasis, cystitis.    10:54 PM Patient seen and examined at bedside. Patient arrives afebrile with normal vital signs. Patient appears dehydrated with dry mucous membranes but non-toxic. The physical exam is remarkable for right sided CVA tenderness and mild right sided abdominal pain. Ordered for CT-abdomen, CBC, CMP, UA culture, HCG qual, urine microscopic, urine culture to evaluate. Patient will be treated with Rocephin 2 g in NS IVPB, morphine injection 4 mg, and Zofran injection 4 mg for her symptoms.  Fluid bolus initiated for dry mucous mebranes.     CBC reveals leukocytosis.  No anemia. Metabolic panel with hyponatremia (134 - likely 2/2 dehydration), hypokalemia to 3.3, mildly elevated blood glucose (likely reactive), normal renal function, no transaminitis.    Potassium repleted with K-Dur 40mEq.    HCG negative. UA suggests infection. Patient given one dose of ceftriaxone.     On reassessment patient's heart rate has improved following IV fluids. Patient still complains of some right flank pain. Given toradol and additional dose of morphine.    CT reveals right perinephric fat stranding and mild right proximal ureteral prominence without stone identified. UTI vs recently passed stone.     Discussed all the results with the patient and gave her the option of admission or attempt at outpatient management. She would like to go home. Her vital signs are stable. Her pain is controlled. She is tolerating PO. She is safe for discharge with close outpatient follow up.    Discharged with prescription for keflex, zofran, pyridium. Follow up with PCP on Monday for recheck. Return to the ER with any new or worsening symptoms.     The patient will return for new or worsening symptoms and is stable at the time of discharge.      DISPOSITION:  Patient will be discharged home in stable condition.    FOLLOW UP:  Hayder Rene M.D.  [address]  [facility] [address] [phone]    Schedule an appointment as soon as possible for a visit in 1 day  For recheck      OUTPATIENT MEDICATIONS:  New Prescriptions    CEPHALEXIN (KEFLEX) 500 MG CAP    Take 1 Cap by mouth 4 times a day for 10 days.    ONDANSETRON (ZOFRAN ODT) 4 MG TABLET DISPERSIBLE    Take 1 Tab by mouth every 6 hours as needed for Nausea.    PHENAZOPYRIDINE (PYRIDIUM) 200 MG TAB    Take 1 Tab by mouth 3 times a day as needed.         FINAL IMPRESSION  1. Cystitis          Berna TORO), am scribing for, and in the presence of, Chris Sterling M.D..    Electronically signed by: Berna Blackwood), 10/28/2018    Chris TORO  BETO Sterling. personally performed the services described in this documentation, as scribed by Berna Lorenz in my presence, and it is both accurate and complete. C.    The note accurately reflects work and decisions made by me.  Chris Sterling  10/29/2018  3:34 AM

## 2018-10-29 NOTE — DISCHARGE INSTRUCTIONS
You were seen in the ER for flank pain and painful urination.  Your symptoms are due to a urinary tract infection that has potentially started working its way towards your kidney.  We have given you 1 dose of antibiotics in the ER.  I have given you the option for admission or to go home and you would like to go home which I feel is reasonable.  I am giving you a prescription for antibiotics, please take these as directed.  I also gave you a prescription for Pyridium and Zofran for pain with urination and nausea, take these as prescribed.  Please follow-up with your primary care physician within 24-48 hours for a recheck.  Return to the ER with any new or worsening symptoms.

## 2018-10-29 NOTE — ED TRIAGE NOTES
Pt complains of right flank pain, lower abdominal cramping pain, pt has urinary frequency and reports foul smelling cloudy urine  Pt reports decreased po intake with vomiting yesterday and continues nausea  Pt provided cloudy candice urine specimen  Urine sent to lab   Pt placed in er lobby, pt educated on triage process, pt instructed to return to triage nurse for concerns or changes in condition, pt verbalizes understanding of instructions.

## 2018-10-30 ENCOUNTER — PATIENT OUTREACH (OUTPATIENT)
Dept: HEALTH INFORMATION MANAGEMENT | Facility: OTHER | Age: 23
End: 2018-10-30

## 2018-10-31 LAB
BACTERIA UR CULT: ABNORMAL
BACTERIA UR CULT: ABNORMAL
SIGNIFICANT IND 70042: ABNORMAL
SITE SITE: ABNORMAL
SOURCE SOURCE: ABNORMAL

## 2018-11-02 ENCOUNTER — OFFICE VISIT (OUTPATIENT)
Dept: MEDICAL GROUP | Facility: PHYSICIAN GROUP | Age: 23
End: 2018-11-02
Payer: COMMERCIAL

## 2018-11-02 VITALS
TEMPERATURE: 99.1 F | HEART RATE: 65 BPM | RESPIRATION RATE: 18 BRPM | WEIGHT: 136 LBS | HEIGHT: 65 IN | BODY MASS INDEX: 22.66 KG/M2 | SYSTOLIC BLOOD PRESSURE: 102 MMHG | DIASTOLIC BLOOD PRESSURE: 64 MMHG | OXYGEN SATURATION: 99 %

## 2018-11-02 DIAGNOSIS — N39.0 E-COLI UTI: ICD-10-CM

## 2018-11-02 DIAGNOSIS — Z87.448 HISTORY OF PYELONEPHRITIS: ICD-10-CM

## 2018-11-02 DIAGNOSIS — B96.20 E-COLI UTI: ICD-10-CM

## 2018-11-02 PROBLEM — Z30.011 ORAL CONTRACEPTION INITIATION: Status: RESOLVED | Noted: 2017-10-03 | Resolved: 2018-11-02

## 2018-11-02 PROCEDURE — 99213 OFFICE O/P EST LOW 20 MIN: CPT | Performed by: FAMILY MEDICINE

## 2018-11-02 NOTE — PROGRESS NOTES
Subjective:   Karol Baca is a 23 y.o. female here today for ER follow-up and paperwork request.  She is accompanied by her boyfriend.    Karol was seen in the emergency room on 10/28/18 for severe right flank pain and abdominal pain.  She had blood and urine tests as well as a CT scan.  She was diagnosed with a UTI and early pyelonephritis and prescribed antibiotics.      Today, she tells me that she is feeling better, but still has some pain in the right side of her lower back.  Denies fever, nausea, vomiting, abdominal or pelvic pain.  No blood in urine.      She is requesting that I fill out paperwork for her job as she missed days.    Current medicines (including changes today)  Current Outpatient Prescriptions   Medication Sig Dispense Refill   • cephALEXin (KEFLEX) 500 MG Cap Take 1 Cap by mouth 4 times a day for 10 days. 40 Cap 0   • ondansetron (ZOFRAN ODT) 4 MG TABLET DISPERSIBLE Take 1 Tab by mouth every 6 hours as needed for Nausea. 10 Tab 0   • phenazopyridine (PYRIDIUM) 200 MG Tab Take 1 Tab by mouth 3 times a day as needed. 6 Tab 0   • PARoxetine (PAXIL) 20 MG Tab Take 1 Tab by mouth every day. 30 Tab 3   • ibuprofen (MOTRIN) 800 MG Tab Take 1 Tab by mouth every 8 hours as needed. 30 Tab 1     No current facility-administered medications for this visit.      She  has a past medical history of Migraine; Recurrent major depressive disorder, in partial remission (HCC) (9/22/2017); and Unspecified asthma(493.90). She also has no past medical history of CAD (coronary artery disease); Cancer (HCC); Congestive heart failure (HCC); Hypertension; Liver disease; Renal disorder; Seizure disorder (HCC); Stroke (HCC); or Type II or unspecified type diabetes mellitus without mention of complication, not stated as uncontrolled.    ROS   No chest pain, no shortness of breath, no abdominal pain.     Objective:     Physical Exam:  Blood pressure 102/64, pulse 65, temperature 37.3 °C (99.1 °F), temperature source  "Temporal, resp. rate 18, height 1.651 m (5' 5\"), weight 61.7 kg (136 lb), last menstrual period 10/11/2018, SpO2 99 %, not currently breastfeeding. Body mass index is 22.63 kg/m².   Constitutional: Alert, no distress, non-toxic appearance.  Skin: Warm, dry, good turgor, no rashes in visible areas.  Eye: Equal, round and reactive, conjunctiva clear, lids normal.  ENMT: Lips without lesions, good dentition, moist mucous membranes.  Neck: Trachea midline, no masses, no thyromegaly.   Respiratory: Unlabored respiratory effort, lungs clear to auscultation, no wheezes, no rhonchi.  Cardiovascular: Normal S1, S2, no murmur, no edema.  Abdomen: Soft, non-tender, no masses, no hepatosplenomegaly.  Psych: Alert and oriented x3, normal affect and mood.    Assessment and Plan:     1. History of pyelonephritis  2. E-coli UTI  ER records reviewed.  Patient's symptoms are improving.  Advised her to complete her antibiotics.  Paperwork completed for work.    Followup: Return if symptoms worsen or fail to improve.         PLEASE NOTE: This dictation was created using voice recognition software. I have made every reasonable attempt to correct obvious errors, but I expect that there are errors of grammar and possibly content that I did not discover before finalizing the note.  "

## 2018-11-02 NOTE — ED NOTES
ED Positive Culture Follow-up/Notification Note:    Date: 11/2/18     Patient seen in the ED on 10/28/2018 for right flank pain x 2 days, dysuria, urgency and difficulty urinating.   1. Cystitis      Given ceftriaxone 2 g IV once in the ER.    Discharge Medication List as of 10/29/2018  1:28 AM      START taking these medications    Details   cephALEXin (KEFLEX) 500 MG Cap Take 1 Cap by mouth 4 times a day for 10 days., Disp-40 Cap, R-0, Print Rx Paper      ondansetron (ZOFRAN ODT) 4 MG TABLET DISPERSIBLE Take 1 Tab by mouth every 6 hours as needed for Nausea., Disp-10 Tab, R-0, Print Rx Paper      phenazopyridine (PYRIDIUM) 200 MG Tab Take 1 Tab by mouth 3 times a day as needed., Disp-6 Tab, R-0, Print Rx Paper             Allergies: Sulfa drugs     Vitals:    10/29/18 0000 10/29/18 0030 10/29/18 0100 10/29/18 0130   BP:       Pulse: (!) 56 66 (!) 57 (!) 53   Resp: 18 18 18 18   Temp:       SpO2: 99% 99% 97% 97%   Weight:       Height:           Final cultures:   Results     Procedure Component Value Units Date/Time    URINE CULTURE(NEW) [[id number]]  (Abnormal)  (Susceptibility) Collected:  10/28/18 2220    Order Status:  Completed Specimen:  Urine Updated:  10/31/18 0944     Significant Indicator POS (POS)     Source UR     Site --     Urine Culture Mixed skin danielle ,000 cfu/mL (A)      Escherichia coli  >100,000 cfu/mL   (A)    Culture & Susceptibility     ESCHERICHIA COLI     Antibiotic Sensitivity Microscan Unit Status    Ampicillin Sensitive <=8 mcg/mL Final    Method: SENSITIVITY, YURI    Cefepime Sensitive <=8 mcg/mL Final    Method: SENSITIVITY, YURI    Cefotaxime Sensitive <=2 mcg/mL Final    Method: SENSITIVITY, YURI    Cefotetan Sensitive <=16 mcg/mL Final    Method: SENSITIVITY, YURI    Ceftazidime Sensitive <=1 mcg/mL Final    Method: SENSITIVITY, YURI    Ceftriaxone Sensitive <=8 mcg/mL Final    Method: SENSITIVITY, YURI    Cefuroxime Sensitive <=4 mcg/mL Final    Method: SENSITIVITY, YRUI     Cephalothin Intermediate 16 mcg/mL Final    Method: SENSITIVITY, YURI    Ciprofloxacin Sensitive <=1 mcg/mL Final    Method: SENSITIVITY, YURI    Gentamicin Sensitive <=4 mcg/mL Final    Method: SENSITIVITY, YURI    Levofloxacin Sensitive <=2 mcg/mL Final    Method: SENSITIVITY, YURI    Nitrofurantoin Sensitive <=32 mcg/mL Final    Method: SENSITIVITY, YURI    Pip/Tazobactam Sensitive <=16 mcg/mL Final    Method: SENSITIVITY, YURI    Piperacillin Sensitive <=16 mcg/mL Final    Method: SENSITIVITY, YURI    Tigecycline Sensitive <=2 mcg/mL Final    Method: SENSITIVITY, YURI    Tobramycin Sensitive <=4 mcg/mL Final    Method: SENSITIVITY, YURI    Trimeth/Sulfa Sensitive <=2/38 mcg/mL Final    Method: SENSITIVITY, YURI                       URINALYSIS CULTURE, IF INDICATED [[id number]]  (Abnormal) Collected:  10/28/18 2220    Order Status:  Completed Specimen:  Urine Updated:  10/28/18 2241     Color DK Yellow     Character Turbid (A)     Specific Gravity 1.035     Ph 5.0     Glucose Negative mg/dL      Ketones 80 (A) mg/dL      Protein 300 (A) mg/dL      Bilirubin Small (A)     Urobilinogen, Urine 1.0     Nitrite Positive (A)     Leukocyte Esterase Moderate (A)     Occult Blood Large (A)     Micro Urine Req Microscopic          Plan:   Currently prescribed therapy may not be effective in treating the organism. Cephalexin does get better activity in general than cephalothin so patient may also be responding. I have attempted to contact the patient to discuss treatment, but there is no answer. Left a message to return call for results.     Shamika Jernigan

## 2018-11-21 ENCOUNTER — APPOINTMENT (OUTPATIENT)
Dept: MEDICAL GROUP | Facility: PHYSICIAN GROUP | Age: 23
End: 2018-11-21
Payer: COMMERCIAL

## 2019-09-13 ENCOUNTER — OFFICE VISIT (OUTPATIENT)
Dept: URGENT CARE | Facility: PHYSICIAN GROUP | Age: 24
End: 2019-09-13
Payer: COMMERCIAL

## 2019-09-13 VITALS
RESPIRATION RATE: 16 BRPM | SYSTOLIC BLOOD PRESSURE: 98 MMHG | HEIGHT: 65 IN | DIASTOLIC BLOOD PRESSURE: 68 MMHG | BODY MASS INDEX: 22.99 KG/M2 | TEMPERATURE: 97.8 F | HEART RATE: 60 BPM | WEIGHT: 138 LBS | OXYGEN SATURATION: 99 %

## 2019-09-13 DIAGNOSIS — J02.9 SORE THROAT: ICD-10-CM

## 2019-09-13 DIAGNOSIS — J06.9 ACUTE URI: Primary | ICD-10-CM

## 2019-09-13 DIAGNOSIS — R05.9 COUGH: ICD-10-CM

## 2019-09-13 LAB
INT CON NEG: NEGATIVE
INT CON POS: POSITIVE
S PYO AG THROAT QL: NORMAL

## 2019-09-13 PROCEDURE — 87880 STREP A ASSAY W/OPTIC: CPT | Performed by: PHYSICIAN ASSISTANT

## 2019-09-13 PROCEDURE — 99214 OFFICE O/P EST MOD 30 MIN: CPT | Performed by: PHYSICIAN ASSISTANT

## 2019-09-13 RX ORDER — ALBUTEROL SULFATE 90 UG/1
2 AEROSOL, METERED RESPIRATORY (INHALATION) EVERY 4 HOURS PRN
Qty: 1 INHALER | Refills: 0 | Status: SHIPPED | OUTPATIENT
Start: 2019-09-13 | End: 2019-11-04

## 2019-09-13 ASSESSMENT — ENCOUNTER SYMPTOMS: COUGH: 1

## 2019-09-13 NOTE — PROGRESS NOTES
Subjective:      Karol Baca is a 24 y.o. female who presents with Cough (Cough. nausea, sore throat, body aches, fever, PND x3days )    PMH:  has a past medical history of Migraine, Recurrent major depressive disorder, in partial remission (HCC) (9/22/2017), and Unspecified asthma(493.90).   Current Outpatient Medications:   •  ibuprofen (MOTRIN) 800 MG Tab, Take 1 Tab by mouth every 8 hours as needed., Disp: 30 Tab, Rfl: 1  •  ondansetron (ZOFRAN ODT) 4 MG TABLET DISPERSIBLE, Take 1 Tab by mouth every 6 hours as needed for Nausea. (Patient not taking: Reported on 9/13/2019), Disp: 10 Tab, Rfl: 0  •  phenazopyridine (PYRIDIUM) 200 MG Tab, Take 1 Tab by mouth 3 times a day as needed. (Patient not taking: Reported on 9/13/2019), Disp: 6 Tab, Rfl: 0  •  PARoxetine (PAXIL) 20 MG Tab, Take 1 Tab by mouth every day. (Patient not taking: Reported on 9/13/2019), Disp: 30 Tab, Rfl: 3  ALLERGIES:   Allergies   Allergen Reactions   • Sulfa Drugs Hives     SURGHX:   Past Surgical History:   Procedure Laterality Date   • GYN SURGERY      exploratory lap   • OTHER ORTHOPEDIC SURGERY      frx arm   • WIDE EXCISION MELANOMA, LEG, W/POSS.STSG       SOCHX:  reports that she quit smoking about 1 years ago. Her smoking use included cigarettes. She smoked 0.50 packs per day. She has never used smokeless tobacco. She reports that she drinks alcohol. She reports that she does not use drugs.  FH: Reviewed with patient, not pertinent to this visit.           Patient presents with:  Cough: Cough. Wheeze, nausea, sore throat, body aches, fever, Post Nasal Drip  X3days.  Pt is taking otc cough /cold medicine with moderate relief except for the wheezing.          Cough   This is a new problem. The current episode started in the past 7 days. The problem has been unchanged. The problem occurs every few minutes. The cough is non-productive. Associated symptoms include ear congestion, a fever, headaches, myalgias, nasal congestion, postnasal  "drip, rhinorrhea, a sore throat and wheezing. The symptoms are aggravated by exercise and lying down. She has tried rest, OTC cough suppressant and body position changes for the symptoms. The treatment provided moderate relief. Her past medical history is significant for environmental allergies. There is no history of COPD or pneumonia.       Review of Systems   Constitutional: Positive for fever.   HENT: Positive for congestion, postnasal drip, rhinorrhea and sore throat.    Respiratory: Positive for cough and wheezing. Negative for sputum production.    Gastrointestinal: Positive for nausea.   Musculoskeletal: Positive for myalgias.   Neurological: Positive for headaches.   Endo/Heme/Allergies: Positive for environmental allergies.   All other systems reviewed and are negative.         Objective:     BP (!) 98/68   Pulse 60   Temp 36.6 °C (97.8 °F) (Temporal)   Resp 16   Ht 1.651 m (5' 5\")   Wt 62.6 kg (138 lb)   SpO2 99%   BMI 22.96 kg/m²      Physical Exam   Constitutional: She is oriented to person, place, and time. She appears well-developed and well-nourished. She is cooperative.  Non-toxic appearance. No distress.   HENT:   Head: Normocephalic and atraumatic.   Right Ear: Tympanic membrane normal.   Left Ear: Tympanic membrane normal.   Nose: Mucosal edema and rhinorrhea present.   Mouth/Throat: Uvula is midline, oropharynx is clear and moist and mucous membranes are normal.   Eyes: Pupils are equal, round, and reactive to light. Conjunctivae and EOM are normal.   Neck: Normal range of motion. Neck supple.   Cardiovascular: Normal rate, regular rhythm and normal heart sounds.   Pulmonary/Chest: Effort normal. No respiratory distress. She has wheezes. She has no rales.   Abdominal: Soft.   Musculoskeletal: Normal range of motion.   Neurological: She is alert and oriented to person, place, and time. Gait normal.   Skin: Skin is warm and dry. Capillary refill takes less than 2 seconds.   Psychiatric: She " has a normal mood and affect.   Nursing note and vitals reviewed.         Strep: neg     Assessment/Plan:     1. Acute URI  albuterol 108 (90 Base) MCG/ACT Aero Soln inhalation aerosol   2. Sore throat  POCT Rapid Strep A    albuterol 108 (90 Base) MCG/ACT Aero Soln inhalation aerosol   3. Cough  albuterol 108 (90 Base) MCG/ACT Aero Soln inhalation aerosol     Discussed that I felt this was viral in nature. Did not see any evidence of a bacterial process. Discussed natural progression and sx care.    PT can continue OTC medications, increase fluids and rest until symptoms improve.     PT should follow up with PCP in 1-2 days for re-evaluation if symptoms have not improved.  Discussed red flags and reasons to return to UC or ED.  Pt and/or family verbalized understanding of diagnosis and follow up instructions and was offered informational handout on diagnosis.  PT discharged.

## 2019-09-13 NOTE — LETTER
September 13, 2019         Patient: Karol Baca   YOB: 1995   Date of Visit: 9/13/2019           To Whom it May Concern:    Karol Baca was seen in my clinic on 9/13/2019. She may return to work on her next regularly scheduled shift on 9/17/2019.      If you have any questions or concerns, please don't hesitate to call.        Sincerely,           Joselyn Arriola P.A.-C.  Electronically Signed

## 2019-09-16 ASSESSMENT — ENCOUNTER SYMPTOMS
SPUTUM PRODUCTION: 0
NAUSEA: 1
WHEEZING: 1
SORE THROAT: 1
MYALGIAS: 1
HEADACHES: 1
FEVER: 1
RHINORRHEA: 1

## 2019-09-16 ASSESSMENT — COPD QUESTIONNAIRES: COPD: 0

## 2019-11-04 ENCOUNTER — OFFICE VISIT (OUTPATIENT)
Dept: MEDICAL GROUP | Facility: PHYSICIAN GROUP | Age: 24
End: 2019-11-04
Payer: COMMERCIAL

## 2019-11-04 VITALS
SYSTOLIC BLOOD PRESSURE: 122 MMHG | OXYGEN SATURATION: 98 % | WEIGHT: 145.4 LBS | HEIGHT: 65 IN | RESPIRATION RATE: 19 BRPM | TEMPERATURE: 98.9 F | DIASTOLIC BLOOD PRESSURE: 82 MMHG | HEART RATE: 89 BPM | BODY MASS INDEX: 24.22 KG/M2

## 2019-11-04 DIAGNOSIS — F33.2 SEVERE EPISODE OF RECURRENT MAJOR DEPRESSIVE DISORDER, WITHOUT PSYCHOTIC FEATURES (HCC): ICD-10-CM

## 2019-11-04 PROCEDURE — 99215 OFFICE O/P EST HI 40 MIN: CPT | Performed by: NURSE PRACTITIONER

## 2019-11-04 SDOH — HEALTH STABILITY: MENTAL HEALTH: HOW OFTEN DO YOU HAVE A DRINK CONTAINING ALCOHOL?: NEVER

## 2019-11-04 ASSESSMENT — PATIENT HEALTH QUESTIONNAIRE - PHQ9
CLINICAL INTERPRETATION OF PHQ2 SCORE: 5
5. POOR APPETITE OR OVEREATING: 3 - NEARLY EVERY DAY
SUM OF ALL RESPONSES TO PHQ QUESTIONS 1-9: 25

## 2019-11-04 ASSESSMENT — LIFESTYLE VARIABLES: SUBSTANCE_ABUSE: 0

## 2019-11-04 ASSESSMENT — ENCOUNTER SYMPTOMS
BLURRED VISION: 0
CHILLS: 0
ABDOMINAL PAIN: 0
FEVER: 0
DEPRESSION: 1
NERVOUS/ANXIOUS: 1
INSOMNIA: 1
PALPITATIONS: 0
BLOOD IN STOOL: 0
SHORTNESS OF BREATH: 0

## 2019-11-04 NOTE — LETTER
November 4, 2019         Patient: Karol Baca   YOB: 1995   Date of Visit: 11/4/2019           To Whom it May Concern:    Karol Baca was seen in my clinic on 11/4/2019. Please excuse her recent absences from work and allow appropriate extension for leave of absence. [place] paperwork will need to be completed by specialist and her referral to specialist is pending. She will need to attend frequent appointments with specialist; date and time to be determined.    If you have any questions or concerns, please don't hesitate to call.        Sincerely,           TONY Tenorio.  Electronically Signed
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
## 2019-11-11 ENCOUNTER — TELEPHONE (OUTPATIENT)
Dept: MEDICAL GROUP | Facility: PHYSICIAN GROUP | Age: 24
End: 2019-11-11

## 2019-11-12 NOTE — TELEPHONE ENCOUNTER
I have contacted patient in follow up for her depression. She was seen by psychology today and has an appointment scheduled with psychiatry next Monday. Advised to contact our office with any questions or concerns.    TONY Tenorio.,DHEERAJC

## 2020-02-04 ENCOUNTER — OFFICE VISIT (OUTPATIENT)
Dept: MEDICAL GROUP | Facility: PHYSICIAN GROUP | Age: 25
End: 2020-02-04
Payer: COMMERCIAL

## 2020-02-04 VITALS
DIASTOLIC BLOOD PRESSURE: 60 MMHG | WEIGHT: 146 LBS | HEIGHT: 65 IN | BODY MASS INDEX: 24.32 KG/M2 | TEMPERATURE: 98.7 F | HEART RATE: 75 BPM | OXYGEN SATURATION: 94 % | SYSTOLIC BLOOD PRESSURE: 114 MMHG

## 2020-02-04 DIAGNOSIS — A08.4 VIRAL GASTROENTERITIS: ICD-10-CM

## 2020-02-04 DIAGNOSIS — J06.9 VIRAL UPPER RESPIRATORY TRACT INFECTION: ICD-10-CM

## 2020-02-04 PROCEDURE — 99213 OFFICE O/P EST LOW 20 MIN: CPT | Performed by: NURSE PRACTITIONER

## 2020-02-04 ASSESSMENT — ENCOUNTER SYMPTOMS
COUGH: 1
FEVER: 0
CHILLS: 0
SHORTNESS OF BREATH: 0
SORE THROAT: 0

## 2020-02-04 NOTE — PROGRESS NOTES
Subjective:   Karol Baca is a 24 y.o. female here today for c/o URI infection.     URI    This is a new problem. The current episode started in the past 7 days (Reports that symptoms started one week ago with nausea, vomiting, and diarrhea. These symptoms resolved after 3 days. She is now experiencing congestion, cough, and headache that began 2 days ago.). The problem has been unchanged. There has been no fever. Associated symptoms include congestion and coughing. Pertinent negatives include no chest pain or sore throat. She has tried nothing for the symptoms.   She does need leave of absence paperwork for work.    Current medicines (including changes today)  No current outpatient medications on file.     No current facility-administered medications for this visit.      She  has a past medical history of Anxiety, Endometriosis, Migraine, Recurrent major depressive disorder, in partial remission (HCC) (2017), and Unspecified asthma(493.90).    Social History     Socioeconomic History   • Marital status: Single     Spouse name: Not on file   • Number of children: Not on file   • Years of education: Not on file   • Highest education level: Not on file   Occupational History     Comment: [employer]   Social Needs   • Financial resource strain: Not on file   • Food insecurity:     Worry: Not on file     Inability: Not on file   • Transportation needs:     Medical: Not on file     Non-medical: Not on file   Tobacco Use   • Smoking status: Current Every Day Smoker     Packs/day: 1.00     Years: 7.00     Pack years: 7.00     Types: Cigarettes     Last attempt to quit: 9/15/2017     Years since quittin.3   • Smokeless tobacco: Never Used   Substance and Sexual Activity   • Alcohol use: Not Currently     Frequency: Never   • Drug use: No   • Sexual activity: Yes     Partners: Male     Birth control/protection: Condom     Comment: Monogomous relationship   Lifestyle   • Physical activity:      "Days per week: Not on file     Minutes per session: Not on file   • Stress: Not on file   Relationships   • Social connections:     Talks on phone: Not on file     Gets together: Not on file     Attends Church service: Not on file     Active member of club or organization: Not on file     Attends meetings of clubs or organizations: Not on file     Relationship status: Not on file   • Intimate partner violence:     Fear of current or ex partner: Not on file     Emotionally abused: Not on file     Physically abused: Not on file     Forced sexual activity: Not on file   Other Topics Concern   • Not on file   Social History Narrative   • Not on file       Review of Systems   Constitutional: Negative for chills and fever.   HENT: Positive for congestion. Negative for sore throat.    Respiratory: Positive for cough. Negative for shortness of breath.    Cardiovascular: Negative for chest pain.         Objective:     /60   Pulse 75   Temp 37.1 °C (98.7 °F) (Temporal)   Ht 1.651 m (5' 5\")   Wt 66.2 kg (146 lb)   SpO2 94%  Body mass index is 24.3 kg/m².     Physical Exam:  Constitutional: Oriented to person, place, and time and well-developed, well-nourished, and in no distress.   HENT:   Head: Normocephalic and atraumatic.   Right Ear: Tympanic membrane and external ear normal.   Left Ear: Tympanic membrane and external ear normal.   Mouth/Throat: Oropharynx is clear and moist and mucous membranes are normal. No oropharyngeal exudate. Posterior oropharyngeal erythema.   Eyes: Conjunctivae and EOM are normal. Pupils are equal, round, and reactive to light.   Neck: Normal range of motion. Neck supple.   Cardiovascular: Normal rate, regular rhythm, normal heart sounds. Exam reveals no friction rub. No murmur heard.  Pulmonary/Chest: Dry, intermittent cough. Effort normal and breath sounds normal. No respiratory distress or use of accessory muscles. No wheezes, rhonchi, or rales.   Neurological: Alert and oriented " to person, place, and time.   Skin: Skin is warm and dry. No cyanosis. No edema.  Psychiatric: Mood, memory, affect and judgment normal.       Assessment and Plan:   The following treatment plan was discussed    1. Viral upper respiratory tract infection  Symptoms most likely viral in nature. Explained that this is a self-limiting illness  usually lasting 7-10 days. Recommend symptomatic treatment, including tylenol or ibuprofen for pain/fever, decongestant, saline nasal spray, vitamin C, and humidifier at bedside. Increase fluids and get plenty of rest. Will complete work paperwork once received.    2. Viral gastroenteritis  Her symptoms have resolved. Encourage fluids with electrolyte drink.       Followup: Return if symptoms worsen or fail to improve.

## 2020-03-31 ENCOUNTER — HOSPITAL ENCOUNTER (EMERGENCY)
Facility: MEDICAL CENTER | Age: 25
End: 2020-03-31
Attending: EMERGENCY MEDICINE
Payer: COMMERCIAL

## 2020-03-31 VITALS
OXYGEN SATURATION: 98 % | TEMPERATURE: 97.5 F | SYSTOLIC BLOOD PRESSURE: 104 MMHG | HEIGHT: 65 IN | RESPIRATION RATE: 16 BRPM | HEART RATE: 66 BPM | WEIGHT: 140.21 LBS | BODY MASS INDEX: 23.36 KG/M2 | DIASTOLIC BLOOD PRESSURE: 69 MMHG

## 2020-03-31 DIAGNOSIS — M77.8 WRIST TENDONITIS: ICD-10-CM

## 2020-03-31 PROCEDURE — 99282 EMERGENCY DEPT VISIT SF MDM: CPT

## 2020-03-31 RX ORDER — PRAZOSIN HYDROCHLORIDE 1 MG/1
CAPSULE ORAL
COMMUNITY
Start: 2020-01-07 | End: 2020-10-19

## 2020-03-31 RX ORDER — CITALOPRAM HYDROBROMIDE 10 MG/1
TABLET ORAL
COMMUNITY
Start: 2020-01-07 | End: 2020-10-19

## 2020-03-31 ASSESSMENT — FIBROSIS 4 INDEX: FIB4 SCORE: .3849001794597505097

## 2020-04-01 ENCOUNTER — HOSPITAL ENCOUNTER (OUTPATIENT)
Dept: RADIOLOGY | Facility: MEDICAL CENTER | Age: 25
End: 2020-04-01
Attending: FAMILY MEDICINE
Payer: COMMERCIAL

## 2020-04-01 ENCOUNTER — OFFICE VISIT (OUTPATIENT)
Dept: URGENT CARE | Facility: PHYSICIAN GROUP | Age: 25
End: 2020-04-01
Payer: COMMERCIAL

## 2020-04-01 VITALS
HEART RATE: 86 BPM | WEIGHT: 142.2 LBS | BODY MASS INDEX: 23.69 KG/M2 | DIASTOLIC BLOOD PRESSURE: 76 MMHG | RESPIRATION RATE: 14 BRPM | SYSTOLIC BLOOD PRESSURE: 118 MMHG | OXYGEN SATURATION: 97 % | TEMPERATURE: 97.7 F | HEIGHT: 65 IN

## 2020-04-01 DIAGNOSIS — G56.02 CARPAL TUNNEL SYNDROME OF LEFT WRIST: ICD-10-CM

## 2020-04-01 DIAGNOSIS — M25.532 WRIST PAIN, LEFT: ICD-10-CM

## 2020-04-01 LAB
INT CON NEG: NEGATIVE
INT CON POS: POSITIVE
POC URINE PREGNANCY TEST: NEGATIVE

## 2020-04-01 PROCEDURE — 73110 X-RAY EXAM OF WRIST: CPT | Mod: LT

## 2020-04-01 PROCEDURE — 99214 OFFICE O/P EST MOD 30 MIN: CPT | Performed by: FAMILY MEDICINE

## 2020-04-01 RX ORDER — PREDNISONE 10 MG/1
TABLET ORAL
Qty: 21 TAB | Refills: 0 | Status: SHIPPED | OUTPATIENT
Start: 2020-04-01 | End: 2020-10-19

## 2020-04-01 ASSESSMENT — FIBROSIS 4 INDEX: FIB4 SCORE: .3849001794597505097

## 2020-04-01 NOTE — ED TRIAGE NOTES
"Karol Baca   25 y.o. female     Chief Complaint   Patient presents with   • Wrist Pain     LEFT x 11 days, \"shoot pain down fingers and arm\"  wrist brace does not help. CMS intact      Pt amb to triage with steady gait for above complaint.      Pt is alert and oriented, speaking in full sentences, follows commands and responds appropriately to questions. NAD. Resp are even and unlabored.   Pt placed in lobby. Pt educated on triage process. Pt encouraged to alert staff for any changes.    /69   Pulse 66   Temp 36.4 °C (97.5 °F) (Temporal)   Resp 16   Ht 1.651 m (5' 5\")   Wt 63.6 kg (140 lb 3.4 oz)   LMP 02/10/2020   SpO2 98%   BMI 23.33 kg/m²     "

## 2020-04-01 NOTE — ED PROVIDER NOTES
"ED Provider Note    Scribed for Caleb Fenton M.D. by Svetlana Malik. 3/31/2020,  7:51 PM.    Means of Arrival: Walk-in  History obtained from: Patient  History limited by: None    CHIEF COMPLAINT  Chief Complaint   Patient presents with   • Wrist Pain     LEFT x 11 days, \"shoot pain down fingers and arm\"  wrist brace does not help. CMS intact     HPI  Karol Baca is a 25 y.o. female who presents to the Emergency Department with bilateral wrist pain left greater than right onset eleven days. She describes pain as sharp, located mostly in the wrists. The pain shoots into all of her fingers. When she drives with her hands up, the palm area of both hands start to go numb and other times she feels parethesia in the hand. She wears wrist braces and tries to keep her wrists straight, but it made her symptoms worse. She has also been taking ibuprofen. She denies any recent injury or accident to wrist. She is an  at the [employer] for the past four years and states she does a lot of repetitive movements with her hands.     REVIEW OF SYSTEMS  CONSTITUTIONAL:  No fever.  MUSCULOSKELETAL: Positive for wrist pain, no joint swelling  NEURO: Positive for paresthesia to bilateral hands  See HPI for further details.     PAST MEDICAL HISTORY  Past Medical History:   Diagnosis Date   • Anxiety    • Endometriosis    • Migraine    • Recurrent major depressive disorder, in partial remission ([facility]) 9/22/2017   • Unspecified asthma(493.90)     Exercise-induced      FAMILY HISTORY  Family History   Problem Relation Age of Onset   • Stroke Maternal Grandmother    • Other Maternal Grandmother         PCOS   • Psychiatric Illness Maternal Grandmother    • Cancer Maternal Grandfather         Prostate   • Heart Disease Paternal Grandmother    • Cancer Paternal Grandmother    • Psychiatric Illness Mother    • Heart Disease Father    • No Known Problems Brother    • No Known Problems Brother    • No Known " "Problems Brother      SOCIAL HISTORY   reports that she has been smoking cigarettes. She has a 5.25 pack-year smoking history. She has never used smokeless tobacco. She reports previous alcohol use. She reports current drug use.    SURGICAL HISTORY  Past Surgical History:   Procedure Laterality Date   • GYN SURGERY      exploratory lap   • OTHER ORTHOPEDIC SURGERY Right     frx arm   • WIDE EXCISION MELANOMA, LEG, W/POSS.STSG       CURRENT MEDICATIONS  Home Medications     Reviewed by Joshua Hull R.N. (Registered Nurse) on 03/31/20 at 1928  Med List Status: Partial   Medication Last Dose Status   citalopram (CELEXA) 10 MG tablet  Active   prazosin (MINIPRESS) 1 MG Cap  Active              ALLERGIES  Allergies   Allergen Reactions   • Paxil [Paroxetine] Unspecified     Gave patient UTI that led to hospitalization   • Sulfa Drugs Hives     PHYSICAL EXAM  VITAL SIGNS: /69   Pulse 66   Temp 36.4 °C (97.5 °F) (Temporal)   Resp 16   Ht 1.651 m (5' 5\")   Wt 63.6 kg (140 lb 3.4 oz)   LMP 02/10/2020   SpO2 98%   BMI 23.33 kg/m²    Gen: alert, no acute distress  HENT: ATNC  Eyes: normal conjuctiva  Resp: No resipiratory distress.   CV: No JVD   Abd: Non-distended  Extremities: No deformity.  No skin changes.  5/5 strength to , wrist flexion, wrist extension, finger extension, finger abduction, thumb extension, thumb flexion bilaterally.  Pain reproduced with resistance against flexion of wrist.  Tenderness along the flexor tendons.  Sensation intact throughout bilateral hands.    COURSE & MEDICAL DECISION MAKING  Pertinent Labs & Imaging studies reviewed. (See chart for details)    7:59 PM Patient seen and examined at bedside. 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 Do not suspect any bony injury given lack of bony tenderness and no accident. Pain is in multiple nerve distribution and not isolated to median nerve therefore " I do not think this is solely carpal tunnel syndrome. Discussed patient that symptoms are consistent with wrist tendonitis due to overuse injury. She understands that symptoms are not likely to improve if she keeps working, but patient states she has to continue to work given her financial situation. I recommend that she tries to modify her movements at work to limit her wrist pain. Recommend wearing the wrist braces at night to keep wrists in neutral position, ice her wrists after shift for 20 minute segments, and to take pain medications such as ibuprofen and tylenol. Counseled on not placing ice directly on her skin and to take ibuprofen with food to prevent GI upset. She will be given referral to orthopedics to establish with a sports medicine doctor. If she has any worsening symptoms she is instructed to return to [facility] ED. Patient is agreeable to discharge plan with no further questions.     Medical Decision Making:  Patient presents with concern for carpal tunnel, while this may be a secondary diagnosis, she appears to have tendinitis of the wrist.  No evidence of cellulitis, compartment syndrome, other dangerous conditions.  She was advised to rest her wrist, however states that this is not possible and that she must remain working for the financial survivability of her family.  She was counseled on harm reduction measures, referred to orthopedics for possible sports medicine eval.    The patient will return for new or worsening symptoms and is stable at the time of discharge.    The patient is referred to a primary physician for blood pressure management, diabetic screening, and for all other preventative health concerns.    DISPOSITION:  Patient will be discharged home in stable condition.    FOLLOW UP:  Riri Juarez A.P.R.N.  [address] [facility] [address]  [facility] [id number] [phone]          Humberto Valadez M.D.  [address] [facility] [address]  [phone]      for evaluation and likely  referral to Sports Medicine    FINAL IMPRESSION  1. Wrist tendonitis          ISvetlana (Scribe), am scribing for, and in the presence of, Caleb Fenton M.D..    Electronically signed by: Svetlana Malik (Scribe), 3/31/2020    Caleb TORO M.D. personally performed the services described in this documentation, as scribed by Svetlana Malik in my presence, and it is both accurate and complete.    The note accurately reflects work and decisions made by me.  Caleb Fenton M.D.  3/31/2020  8:21 PM

## 2020-04-01 NOTE — DISCHARGE INSTRUCTIONS
You were seen in the emergency department for pain in both wrist.  This is most likely an overuse injury.  We recommend that you use your with wrist braces, limit the gripping and flexion motion of your wrists.  You may ice your wrists after work for 20 minutes on, 20 minutes off.  Please do not place the ice directly against your skin, which might cause frostbite.    For pain you can take ibuprofen (Motrin), 600mg every 6 hours as needed for pain (take with food to avoid GI upset). You can also take acetaminophen (Tylenol), 1000mg every 8 hours as needed for pain. Do not take more than 3000mg of acetaminophen in any 24 hour period.  Taking these medications regularly during the day can be very effective in controlling pain.

## 2020-04-02 NOTE — PROGRESS NOTES
"Subjective:      Karol Baca is a 25 y.o. female who presents with Wrist Pain (seen in er lastnight for wrist pain,would like a new note without restrictions)            This is a new problem.  25-year-old presenting for repeat evaluation of the left wrist pain for which she was seen yesterday at the local emergency department and was given splint and excuse and restriction.  She works for [employer] and she needs her job and she needs to have a restriction and feels like she can go back at work.  She is been having hand pain and shooting pain in the fourth and fifth finger for the past more than a week.  No history of carpal tunnel reported.  She does not have risk of repetitive hand motion.  Otherwise healthy.  No fever or chills.      Review of Systems   All other systems reviewed and are negative.         Objective:     /76 (BP Location: Right arm, Patient Position: Sitting, BP Cuff Size: Adult)   Pulse 86   Temp 36.5 °C (97.7 °F) (Temporal)   Resp 14   Ht 1.651 m (5' 5\")   Wt 64.5 kg (142 lb 3.2 oz)   SpO2 97%   BMI 23.66 kg/m²      Physical Exam  Constitutional:       General: She is not in acute distress.     Appearance: She is not ill-appearing, toxic-appearing or diaphoretic.   HENT:      Head: Normocephalic and atraumatic.      Right Ear: External ear normal.      Left Ear: External ear normal.      Nose: Nose normal.   Eyes:      Conjunctiva/sclera: Conjunctivae normal.   Cardiovascular:      Rate and Rhythm: Normal rate.   Pulmonary:      Effort: Pulmonary effort is normal. No respiratory distress.      Breath sounds: No stridor.   Musculoskeletal:      Left wrist: She exhibits normal range of motion, no tenderness, no bony tenderness, no swelling, no effusion, no crepitus, no deformity and no laceration.      Comments: Finkelstein test was positive, otherwise normal range of motion, no swelling or erythema.   Skin:     General: Skin is warm.      Coloration: Skin is not jaundiced or pale. "   Neurological:      Mental Status: She is alert and oriented to person, place, and time.   Psychiatric:         Mood and Affect: Mood normal.              Results for orders placed or performed in visit on 04/01/20   POC URINE PREGNANCY   Result Value Ref Range    POC Urine Pregnancy Test NEGATIVE Negative    Internal Control Positive Positive     Internal Control Negative Negative    .    X-ray of the left wrist is negative for acute abnormalities       Assessment/Plan:     ASSESSMENT:PLAN:  1. Carpal tunnel syndrome of left wrist  - predniSONE (DELTASONE) 10 MG Tab; Start 60 mg prednisone on day one, 50 mg PO on day two, 40mg PO on day three, 30 mg on day four, 20 mg on day five, 10 mg p.o. on last day  Dispense: 21 Tab; Refill: 0    2. Wrist pain, left  - POC URINE PREGNANCY  - DX-WRIST-COMPLETE 3+ LEFT; Future      She already has a wrist splint that was given to her from ED that she should we are.  Likely carpal tunnel syndrome  Recommend continuing restriction that was written from the ED as it is in her best interest.  Oral steroid taper discussed  Tylenol as needed  Icing as needed  Follow-up with primary care within a week, sooner if any worsening or new symptoms

## 2020-04-06 ENCOUNTER — OFFICE VISIT (OUTPATIENT)
Dept: MEDICAL GROUP | Facility: PHYSICIAN GROUP | Age: 25
End: 2020-04-06
Payer: COMMERCIAL

## 2020-04-06 VITALS
RESPIRATION RATE: 16 BRPM | SYSTOLIC BLOOD PRESSURE: 102 MMHG | OXYGEN SATURATION: 100 % | TEMPERATURE: 96.8 F | BODY MASS INDEX: 24.16 KG/M2 | DIASTOLIC BLOOD PRESSURE: 64 MMHG | WEIGHT: 145 LBS | HEART RATE: 72 BPM | HEIGHT: 65 IN

## 2020-04-06 DIAGNOSIS — M25.532 LEFT WRIST PAIN: ICD-10-CM

## 2020-04-06 DIAGNOSIS — M77.9 TENDONITIS: ICD-10-CM

## 2020-04-06 PROCEDURE — 99213 OFFICE O/P EST LOW 20 MIN: CPT | Performed by: NURSE PRACTITIONER

## 2020-04-06 ASSESSMENT — ENCOUNTER SYMPTOMS
WEAKNESS: 0
FOCAL WEAKNESS: 0
TINGLING: 0
SENSORY CHANGE: 0
CHILLS: 0
FEVER: 0

## 2020-04-06 ASSESSMENT — FIBROSIS 4 INDEX: FIB4 SCORE: .3849001794597505097

## 2020-04-06 NOTE — ASSESSMENT & PLAN NOTE
Patient was evaluated at [facility] ED on 3/31/2020 and again at [facility] urgent care on 4/1/2020 for left wrist pain. X-ray of L wrist performed on 4/1/2020 showed no abnormal findings, including fracture or dislocation. She was prescribed oral prednisone and advised to wear wrist splint. She was provided with work note to return with restrictions. Today she reports that improvement of her symptoms with rest and use of wrist brace. She hasn't started prednisone. She is requesting updated work note to return without restrictions and reports that she hasn't been able to return to work with current restrictions. 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

## 2020-04-06 NOTE — PROGRESS NOTES
Subjective:   Karol Baca is a 25 y.o. female here today for L wrist pain.    Left wrist pain  Patient was evaluated at [facility] ED on 3/31/2020 and again at [facility] urgent care on 4/1/2020 for left wrist pain. X-ray of L wrist performed on 4/1/2020 showed no abnormal findings, including fracture or dislocation. She was prescribed oral prednisone and advised to wear wrist splint. She was provided with work note to return with restrictions. Today she reports that improvement of her symptoms with rest and use of wrist brace. She hasn't started prednisone. She is requesting updated work note to return without restrictions and reports that she hasn't been able to return to work with current restrictions. 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     Current medicines (including changes today)  Current Outpatient Medications   Medication Sig Dispense Refill   • prazosin (MINIPRESS) 1 MG Cap      • citalopram (CELEXA) 10 MG tablet      • predniSONE (DELTASONE) 10 MG Tab Start 60 mg prednisone on day one, 50 mg PO on day two, 40mg PO on day three, 30 mg on day four, 20 mg on day five, 10 mg p.o. on last day 21 Tab 0     No current facility-administered medications for this visit.      She  has a past medical history of Anxiety, Endometriosis, Migraine, Recurrent major depressive disorder, in partial remission (HCC) (9/22/2017), and Unspecified asthma(493.90).    Social History     Socioeconomic History   • Marital status: Single     Spouse name: Not on file   • Number of children: Not on file   • Years of education: Not on file   • Highest education level: Not on file   Occupational History     Comment: [employer]   Social Needs   • Financial resource strain: Not on file   • Food insecurity     Worry: Not on file     Inability: Not on file   • Transportation needs     Medical: Not on file     Non-medical: Not on file   Tobacco Use   • Smoking status: Current Every Day Smoker     Packs/day:  "0.75     Years: 7.00     Pack years: 5.25     Types: Cigarettes     Last attempt to quit: 9/15/2017     Years since quittin.5   • Smokeless tobacco: Never Used   Substance and Sexual Activity   • Alcohol use: Not Currently     Frequency: Never   • Drug use: Yes     Comment: marijuana   • Sexual activity: Yes     Partners: Male     Birth control/protection: Condom     Comment: Monogomous relationship   Lifestyle   • Physical activity     Days per week: Not on file     Minutes per session: Not on file   • Stress: Not on file   Relationships   • Social connections     Talks on phone: Not on file     Gets together: Not on file     Attends Voodoo service: Not on file     Active member of club or organization: Not on file     Attends meetings of clubs or organizations: Not on file     Relationship status: Not on file   • Intimate partner violence     Fear of current or ex partner: Not on file     Emotionally abused: Not on file     Physically abused: Not on file     Forced sexual activity: Not on file   Other Topics Concern   • Not on file   Social History Narrative   • Not on file       Review of Systems   Constitutional: Negative for chills and fever.   Musculoskeletal: Negative for joint pain.   Neurological: Negative for tingling, sensory change, focal weakness and weakness.        Objective:     /64 (BP Location: Right arm, Patient Position: Sitting)   Pulse 72   Temp 36 °C (96.8 °F)   Resp 16   Ht 1.651 m (5' 5\")   Wt 65.8 kg (145 lb)   SpO2 100%  Body mass index is 24.13 kg/m².     Physical Exam:  Constitutional: Oriented to person, place, and time and well-developed, well-nourished, and in no distress.   HENT:   Head: Normocephalic and atraumatic.   Eyes: Conjunctivae and EOM are normal. Pupils are equal, round, and reactive to light.   Neck: Normal range of motion. Neck supple.  Musculoskeletal: L wrist: Full range of motion. No deformity or swelling of joint. Full  strength. Negative Tinel " and Phalen test.  Neurological: Alert and oriented to person, place, and time.   Skin: Skin is warm and dry. No cyanosis. No edema.  Psychiatric: Mood, memory, affect and judgment normal.       Assessment and Plan:   The following treatment plan was discussed    1. Left wrist pain  Stable, improved. Reviewed notes from [facility] urgent care and ED. She was provided with work note without restrictions. Advised to continue wearing wrist brace for support and ice/heat for 20 min intervals. Notify clinic if symptoms worsen.    2. Tendonitis  See #1.      Followup: Return if symptoms worsen or fail to improve.

## 2020-04-06 NOTE — LETTER
April 6, 2020         Patient: Karol Baca   YOB: 1995   Date of Visit: 4/6/2020           To Whom it May Concern:    Karol Baca was seen in my clinic on 4/6/2020. She may return to work without restrictions on 4/7/20.    If you have any questions or concerns, please don't hesitate to call.        Sincerely,           TONY Tenorio.  Electronically Signed

## 2020-05-29 ENCOUNTER — HOSPITAL ENCOUNTER (EMERGENCY)
Facility: MEDICAL CENTER | Age: 25
End: 2020-05-29
Attending: EMERGENCY MEDICINE
Payer: COMMERCIAL

## 2020-05-29 VITALS
SYSTOLIC BLOOD PRESSURE: 123 MMHG | RESPIRATION RATE: 16 BRPM | OXYGEN SATURATION: 97 % | BODY MASS INDEX: 21.66 KG/M2 | WEIGHT: 130 LBS | DIASTOLIC BLOOD PRESSURE: 69 MMHG | HEIGHT: 65 IN | HEART RATE: 59 BPM | TEMPERATURE: 98.9 F

## 2020-05-29 DIAGNOSIS — J02.0 STREP THROAT: ICD-10-CM

## 2020-05-29 PROCEDURE — 87651 STREP A DNA AMP PROBE: CPT

## 2020-05-29 PROCEDURE — 700102 HCHG RX REV CODE 250 W/ 637 OVERRIDE(OP): Performed by: EMERGENCY MEDICINE

## 2020-05-29 PROCEDURE — 99283 EMERGENCY DEPT VISIT LOW MDM: CPT

## 2020-05-29 PROCEDURE — A9270 NON-COVERED ITEM OR SERVICE: HCPCS | Performed by: EMERGENCY MEDICINE

## 2020-05-29 RX ORDER — DEXAMETHASONE 4 MG/1
8 TABLET ORAL ONCE
Status: COMPLETED | OUTPATIENT
Start: 2020-05-29 | End: 2020-05-29

## 2020-05-29 RX ORDER — AMOXICILLIN 500 MG/1
1000 CAPSULE ORAL ONCE
Status: COMPLETED | OUTPATIENT
Start: 2020-05-29 | End: 2020-05-29

## 2020-05-29 RX ORDER — AMOXICILLIN 500 MG/1
1000 CAPSULE ORAL DAILY
Qty: 20 CAP | Refills: 0 | Status: SHIPPED | OUTPATIENT
Start: 2020-05-29 | End: 2020-06-03

## 2020-05-29 RX ADMIN — DEXAMETHASONE 8 MG: 4 TABLET ORAL at 17:01

## 2020-05-29 RX ADMIN — AMOXICILLIN 1000 MG: 500 CAPSULE ORAL at 18:29

## 2020-05-29 ASSESSMENT — FIBROSIS 4 INDEX: FIB4 SCORE: .3849001794597505097

## 2020-05-29 NOTE — ED PROVIDER NOTES
ED Provider Note    CHIEF COMPLAINT  Chief Complaint   Patient presents with   • Sore Throat   • Headache   • Nausea       HPI  Karol Baca is a 25 y.o. female who presents with complaint of sore throat.  The patient states that she has had a sore throat for approximately 2 days and she has white discharge on her tonsils.  She states is difficult to swallow secondary to the size of tonsils.  In addition, she states that her neck slightly hurts and she has slight headache.  Denies fever, cough, nausea, vomiting, shortness of breath, chest pain.  She does have COVID exposure but states that she has had no coughing fever or body aches.    REVIEW OF SYSTEMS  Pertinent positives include sore throat with white discharge in her tonsils, headache, neck pain  Pertinent negatives include her, cough,     PAST MEDICAL HISTORY  Past Medical History:   Diagnosis Date   • Anxiety    • Endometriosis    • Migraine    • Recurrent major depressive disorder, in partial remission (HCC) 9/22/2017   • Unspecified asthma(493.90)     Exercise-induced        FAMILY HISTORY  Family History   Problem Relation Age of Onset   • Stroke Maternal Grandmother    • Other Maternal Grandmother         PCOS   • Psychiatric Illness Maternal Grandmother    • Cancer Maternal Grandfather         Prostate   • Heart Disease Paternal Grandmother    • Cancer Paternal Grandmother    • Psychiatric Illness Mother    • Heart Disease Father    • No Known Problems Brother    • No Known Problems Brother    • No Known Problems Brother        SOCIAL HISTORY  Social History     Socioeconomic History   • Marital status: Single     Spouse name: Not on file   • Number of children: Not on file   • Years of education: Not on file   • Highest education level: Not on file   Occupational History     Comment: [employer]   Social Needs   • Financial resource strain: Not on file   • Food insecurity     Worry: Not on file     Inability: Not on file   •  "Transportation needs     Medical: Not on file     Non-medical: Not on file   Tobacco Use   • Smoking status: Current Every Day Smoker     Packs/day: 0.75     Years: 7.00     Pack years: 5.25     Types: Cigarettes     Last attempt to quit: 9/15/2017     Years since quittin.7   • Smokeless tobacco: Never Used   Substance and Sexual Activity   • Alcohol use: Not Currently     Frequency: Never   • Drug use: Yes     Comment: marijuana   • Sexual activity: Yes     Partners: Male     Birth control/protection: Condom     Comment: Monogomous relationship   Lifestyle   • Physical activity     Days per week: Not on file     Minutes per session: Not on file   • Stress: Not on file   Relationships   • Social connections     Talks on phone: Not on file     Gets together: Not on file     Attends Anabaptist service: Not on file     Active member of club or organization: Not on file     Attends meetings of clubs or organizations: Not on file     Relationship status: Not on file   • Intimate partner violence     Fear of current or ex partner: Not on file     Emotionally abused: Not on file     Physically abused: Not on file     Forced sexual activity: Not on file   Other Topics Concern   • Not on file   Social History Narrative   • Not on file       SURGICAL HISTORY  Past Surgical History:   Procedure Laterality Date   • GYN SURGERY      exploratory lap   • OTHER ORTHOPEDIC SURGERY Right     frx arm   • WIDE EXCISION MELANOMA, LEG, W/[email or web address].STSG         CURRENT MEDICATIONS  Home Medications    **Home medications have not yet been reviewed for this encounter**         ALLERGIES  Allergies   Allergen Reactions   • Paxil [Paroxetine] Unspecified     Gave patient UTI that led to hospitalization   • Sulfa Drugs Hives       PHYSICAL EXAM  VITAL SIGNS: /69   Pulse (!) 59   Temp 37.2 °C (98.9 °F) (Temporal)   Resp 16   Ht 1.651 m (5' 5\")   Wt 59 kg (130 lb)   SpO2 97%   BMI 21.63 kg/m²      Constitutional: Well developed, Well " nourished, No acute distress, Non-toxic appearance.   HENT: Normocephalic, Atraumatic, Bilateral external ears normal, lateral tonsillar edema with exudate, no peritonsillar abscess or mass  Neck: Anterior cervical lymphadenopathy  Eyes: PERRLA, EOMI, Conjunctiva normal, No discharge.   Thorax & Lungs:  No respiratory distress, no rales, no rhonchi, No wheezing, No chest tenderness.   Skin: Warm, Dry, No erythema, No rash.   Neurologic: Alert & oriented x 3, No focal deficits noted, normal gait.        COURSE & MEDICAL DECISION MAKING  Pertinent Labs & Imaging studies reviewed. (See chart for details)  This is a pleasant 25-year-old female presents with strep throat.  She has no evidence of peritonsillar abscess or retropharyngeal abscess.  She received Decadron 8 mg orally, she is positive p.o.  She received amoxicillin 1 g orally for antibiotics and prescription has been given.  Strict return precautions have been given.  Prior to discharge, the patient's positive p.o. without difficulty    New Prescriptions    AMOXICILLIN (AMOXIL) 500 MG CAP    Take 2 Caps by mouth every day for 10 days.         FINAL IMPRESSION     1. Strep throat Active         DISPOSITION:  Patient will be discharged home in stable condition.    FOLLOW UP:  [facility], Emergency Dept  [address] [id number] [phone]    If symptoms worsen    ADARSH TenorioPGracieRGracieN.  [address] [id number] [phone]    Schedule an appointment as soon as possible for a visit   As needed      OUTPATIENT MEDICATIONS:  New Prescriptions    AMOXICILLIN (AMOXIL) 500 MG CAP    Take 2 Caps by mouth every day for 10 days.       Electronically signed by: Jacky Faustin D.O., 5/29/2020 4:58 PM

## 2020-05-30 NOTE — ED NOTES
Discharge instructions given and explained. All questions answered and pt verbalized understanding.

## 2020-06-03 ENCOUNTER — HOSPITAL ENCOUNTER (EMERGENCY)
Facility: MEDICAL CENTER | Age: 25
End: 2020-06-03
Attending: EMERGENCY MEDICINE | Admitting: EMERGENCY MEDICINE
Payer: COMMERCIAL

## 2020-06-03 VITALS
HEIGHT: 65 IN | RESPIRATION RATE: 18 BRPM | TEMPERATURE: 97.4 F | SYSTOLIC BLOOD PRESSURE: 112 MMHG | DIASTOLIC BLOOD PRESSURE: 53 MMHG | OXYGEN SATURATION: 98 % | HEART RATE: 54 BPM | WEIGHT: 141.09 LBS | BODY MASS INDEX: 23.51 KG/M2

## 2020-06-03 DIAGNOSIS — J02.0 STREP PHARYNGITIS: ICD-10-CM

## 2020-06-03 LAB
ANION GAP SERPL CALC-SCNC: 11 MMOL/L (ref 7–16)
BASOPHILS # BLD AUTO: 0.6 % (ref 0–1.8)
BASOPHILS # BLD: 0.1 K/UL (ref 0–0.12)
BUN SERPL-MCNC: 11 MG/DL (ref 8–22)
CALCIUM SERPL-MCNC: 9.5 MG/DL (ref 8.5–10.5)
CHLORIDE SERPL-SCNC: 102 MMOL/L (ref 96–112)
CO2 SERPL-SCNC: 25 MMOL/L (ref 20–33)
CREAT SERPL-MCNC: 0.76 MG/DL (ref 0.5–1.4)
EOSINOPHIL # BLD AUTO: 0.32 K/UL (ref 0–0.51)
EOSINOPHIL NFR BLD: 2.1 % (ref 0–6.9)
ERYTHROCYTE [DISTWIDTH] IN BLOOD BY AUTOMATED COUNT: 40.6 FL (ref 35.9–50)
GLUCOSE SERPL-MCNC: 96 MG/DL (ref 65–99)
HCG SERPL QL: NEGATIVE
HCT VFR BLD AUTO: 43.6 % (ref 37–47)
HETEROPH AB SER QL: NEGATIVE
HGB BLD-MCNC: 14.6 G/DL (ref 12–16)
IMM GRANULOCYTES # BLD AUTO: 0.11 K/UL (ref 0–0.11)
IMM GRANULOCYTES NFR BLD AUTO: 0.7 % (ref 0–0.9)
LYMPHOCYTES # BLD AUTO: 2.33 K/UL (ref 1–4.8)
LYMPHOCYTES NFR BLD: 15 % (ref 22–41)
MCH RBC QN AUTO: 30.5 PG (ref 27–33)
MCHC RBC AUTO-ENTMCNC: 33.5 G/DL (ref 33.6–35)
MCV RBC AUTO: 91 FL (ref 81.4–97.8)
MONOCYTES # BLD AUTO: 0.95 K/UL (ref 0–0.85)
MONOCYTES NFR BLD AUTO: 6.1 % (ref 0–13.4)
NEUTROPHILS # BLD AUTO: 11.73 K/UL (ref 2–7.15)
NEUTROPHILS NFR BLD: 75.5 % (ref 44–72)
NRBC # BLD AUTO: 0 K/UL
NRBC BLD-RTO: 0 /100 WBC
PLATELET # BLD AUTO: 382 K/UL (ref 164–446)
PMV BLD AUTO: 10.1 FL (ref 9–12.9)
POTASSIUM SERPL-SCNC: 4.2 MMOL/L (ref 3.6–5.5)
RBC # BLD AUTO: 4.79 M/UL (ref 4.2–5.4)
S PYO DNA SPEC NAA+PROBE: DETECTED
SODIUM SERPL-SCNC: 138 MMOL/L (ref 135–145)
WBC # BLD AUTO: 15.5 K/UL (ref 4.8–10.8)

## 2020-06-03 PROCEDURE — 99284 EMERGENCY DEPT VISIT MOD MDM: CPT

## 2020-06-03 PROCEDURE — 87651 STREP A DNA AMP PROBE: CPT

## 2020-06-03 PROCEDURE — 96365 THER/PROPH/DIAG IV INF INIT: CPT

## 2020-06-03 PROCEDURE — 84703 CHORIONIC GONADOTROPIN ASSAY: CPT

## 2020-06-03 PROCEDURE — 700105 HCHG RX REV CODE 258: Performed by: EMERGENCY MEDICINE

## 2020-06-03 PROCEDURE — 96375 TX/PRO/DX INJ NEW DRUG ADDON: CPT

## 2020-06-03 PROCEDURE — 86308 HETEROPHILE ANTIBODY SCREEN: CPT

## 2020-06-03 PROCEDURE — 80048 BASIC METABOLIC PNL TOTAL CA: CPT

## 2020-06-03 PROCEDURE — 700111 HCHG RX REV CODE 636 W/ 250 OVERRIDE (IP): Performed by: EMERGENCY MEDICINE

## 2020-06-03 PROCEDURE — 85025 COMPLETE CBC W/AUTO DIFF WBC: CPT

## 2020-06-03 RX ORDER — ONDANSETRON 2 MG/ML
4 INJECTION INTRAMUSCULAR; INTRAVENOUS ONCE
Status: COMPLETED | OUTPATIENT
Start: 2020-06-03 | End: 2020-06-03

## 2020-06-03 RX ORDER — AMOXICILLIN AND CLAVULANATE POTASSIUM 875; 125 MG/1; MG/1
1 TABLET, FILM COATED ORAL 2 TIMES DAILY
Qty: 20 TAB | Refills: 0 | Status: SHIPPED | OUTPATIENT
Start: 2020-06-03 | End: 2020-06-13

## 2020-06-03 RX ORDER — MORPHINE SULFATE 4 MG/ML
4 INJECTION, SOLUTION INTRAMUSCULAR; INTRAVENOUS ONCE
Status: COMPLETED | OUTPATIENT
Start: 2020-06-03 | End: 2020-06-03

## 2020-06-03 RX ORDER — DEXAMETHASONE SODIUM PHOSPHATE 4 MG/ML
10 INJECTION, SOLUTION INTRA-ARTICULAR; INTRALESIONAL; INTRAMUSCULAR; INTRAVENOUS; SOFT TISSUE ONCE
Status: COMPLETED | OUTPATIENT
Start: 2020-06-03 | End: 2020-06-03

## 2020-06-03 RX ADMIN — ONDANSETRON 4 MG: 2 INJECTION INTRAMUSCULAR; INTRAVENOUS at 14:37

## 2020-06-03 RX ADMIN — MORPHINE SULFATE 4 MG: 4 INJECTION INTRAVENOUS at 14:40

## 2020-06-03 RX ADMIN — DEXAMETHASONE SODIUM PHOSPHATE 10 MG: 4 INJECTION, SOLUTION INTRA-ARTICULAR; INTRALESIONAL; INTRAMUSCULAR; INTRAVENOUS; SOFT TISSUE at 14:38

## 2020-06-03 RX ADMIN — CEFTRIAXONE SODIUM 2 G: 2 INJECTION, POWDER, FOR SOLUTION INTRAMUSCULAR; INTRAVENOUS at 14:45

## 2020-06-03 ASSESSMENT — FIBROSIS 4 INDEX: FIB4 SCORE: .3849001794597505097

## 2020-06-03 NOTE — ED NOTES
Assist RN:  Lab called with critical result of Positive Group A Strep. Critical lab result read back to lab.  Dr. Sparks notified of critical lab result.  Critical lab result read back by Dr. Sparks.

## 2020-06-03 NOTE — ED TRIAGE NOTES
Chief Complaint   Patient presents with   • Sore Throat     seen several days ago and started on amoxicillin for strep. She reports having worsening right sided sore throat this morning with difficulty swallowing.      Ambulatory to triage for above. Explained triage process, to waiting room. Asked to inform RN if questions or concerns arise.

## 2020-06-03 NOTE — ED PROVIDER NOTES
ED Provider Note    Scribed for Tracee Sparks M.D. by Nishant Carter. 6/3/2020  1:58 PM    Primary care provider: IRWIN Tenorio  Means of arrival: Walk in  History obtained from: Patient  History limited by: none  CHIEF COMPLAINT  Chief Complaint   Patient presents with   • Sore Throat     seen several days ago and started on amoxicillin for strep. She reports having worsening right sided sore throat this morning with difficulty swallowing.        HPI  Karol Baca is a 25 y.o. female who presents to the ER with complaint of worsening right-sided throat pain that began this morning.  She states that she was here almost a week ago and was diagnosed with strep throat.  She was sent home on amoxicillin.  She was given a dose of Decadron here in the ER.  She says she felt much better with the steroid and even more so when she got on the antibiotic.  She says she was actually doing pretty well yesterday.  This morning she woke up and noticed more pain and swelling on the right side of her throat and neck area.  It hurts to swallow but she is able to swallow fluids and saliva.  No fevers.  No cough.  She denies exposures to anybody was tested positive for coronavirus.  She has been taking her antibiotics as prescribed.  No nausea or vomiting.    REVIEW OF SYSTEMS  See HPI for further details.   Pertinent positives include: Right sided throat pain. Pain with swallowing  Pertinent negatives include: no fevers, no cough. No nausea or vomiting  All other systems are negative.    PAST MEDICAL HISTORY  Past Medical History:   Diagnosis Date   • Anxiety    • Endometriosis    • Migraine    • Recurrent major depressive disorder, in partial remission ([facility]) 9/22/2017   • Unspecified asthma(493.90)     Exercise-induced        FAMILY HISTORY  Family History   Problem Relation Age of Onset   • Stroke Maternal Grandmother    • Other Maternal Grandmother         PCOS   • Psychiatric Illness Maternal Grandmother    • Cancer  "Maternal Grandfather         Prostate   • Heart Disease Paternal Grandmother    • Cancer Paternal Grandmother    • Psychiatric Illness Mother    • Heart Disease Father    • No Known Problems Brother    • No Known Problems Brother    • No Known Problems Brother        SOCIAL HISTORY  Social History     Tobacco Use   • Smoking status: Current Every Day Smoker     Packs/day: 0.75     Years: 7.00     Pack years: 5.25     Types: Cigarettes     Last attempt to quit: 9/15/2017     Years since quittin.7   • Smokeless tobacco: Never Used   Substance Use Topics   • Alcohol use: Not Currently     Frequency: Never   • Drug use: Yes     Comment: marijuana      Social History     Substance and Sexual Activity   Drug Use Yes    Comment: marijuana       SURGICAL HISTORY  Past Surgical History:   Procedure Laterality Date   • GYN SURGERY      exploratory lap   • OTHER ORTHOPEDIC SURGERY Right     frx arm   • WIDE EXCISION MELANOMA, LEG, W/POSS.STSG         CURRENT MEDICATIONS  Home Medications     Reviewed by Celso Grayson R.N. (Registered Nurse) on 20 at 1340  Med List Status: Partial   Medication Last Dose Status   amoxicillin (AMOXIL) 500 MG Cap  Active   citalopram (CELEXA) 10 MG tablet ran out Active   prazosin (MINIPRESS) 1 MG Cap  Active   predniSONE (DELTASONE) 10 MG Tab  Active                ALLERGIES  Allergies   Allergen Reactions   • Paxil [Paroxetine] Unspecified     Gave patient UTI that led to hospitalization   • Sulfa Drugs Hives       PHYSICAL EXAM  VITAL SIGNS: /73   Pulse 62   Temp 35.8 °C (96.5 °F) (Temporal)   Resp 18   Ht 1.651 m (5' 5\")   Wt 64 kg (141 lb 1.5 oz)   SpO2 100%   BMI 23.48 kg/m²    Constitutional: Well developed, well nourished; No acute distress; Non-toxic appearance.   HENT: Normocephalic, atraumatic; Bilateral external ears normal; Oropharynx with moist mucous membranes; mild swelling to the right peritonsillar region without barber peritonsillar abscess.  Uvula is " midline.  There is a grayish exudate on the right tonsil.  No drooling.  No stridor.  No trismus.  Eyes: PERRL, EOMI, Conjunctiva normal. No discharge.   Neck:  Supple, nontender midline; No stridor; No nuchal rigidity.   Lymphatic: Enlarged and tender anterior cervical lymphadenopathy noted, right greater than left.  Cardiovascular: Regular rate and rhythm without murmurs, rubs, or gallop.   Thorax & Lungs: No respiratory distress, breath sounds clear to auscultation bilaterally without wheezing, rales or rhonchi. Nontender chest wall. No crepitus or subcutaneous air  Abdomen: Soft, nontender, bowel sounds normal. No obvious masses; No pulsatile masses; no rebound, guarding, or peritoneal signs.   Skin: Good color; warm and dry without rash or petechia.  Back: Nontender, No CVA tenderness.   Extremities: Distal dorsalis pedis, posterior tibial pulses are equal bilaterally; No edema; Nontender calves or saphenous, No cyanosis, No clubbing.   Musculoskeletal: Good range of motion in all major joints. No tenderness to palpation or major deformities noted.   Neurologic: Alert & oriented x 4, clear speech        LABS/RADIOLOGY/PROCEDURES  Results for orders placed or performed during the hospital encounter of 06/03/20   CBC WITH DIFFERENTIAL   Result Value Ref Range    WBC 15.5 (H) 4.8 - 10.8 K/uL    RBC 4.79 4.20 - 5.40 M/uL    Hemoglobin 14.6 12.0 - 16.0 g/dL    Hematocrit 43.6 37.0 - 47.0 %    MCV 91.0 81.4 - 97.8 fL    MCH 30.5 27.0 - 33.0 pg    MCHC 33.5 (L) 33.6 - 35.0 g/dL    RDW 40.6 35.9 - 50.0 fL    Platelet Count 382 164 - 446 K/uL    MPV 10.1 9.0 - 12.9 fL    Neutrophils-Polys 75.50 (H) 44.00 - 72.00 %    Lymphocytes 15.00 (L) 22.00 - 41.00 %    Monocytes 6.10 0.00 - 13.40 %    Eosinophils 2.10 0.00 - 6.90 %    Basophils 0.60 0.00 - 1.80 %    Immature Granulocytes 0.70 0.00 - 0.90 %    Nucleated RBC 0.00 /100 WBC    Neutrophils (Absolute) 11.73 (H) 2.00 - 7.15 K/uL    Lymphs (Absolute) 2.33 1.00 - 4.80 K/uL     Monos (Absolute) 0.95 (H) 0.00 - 0.85 K/uL    Eos (Absolute) 0.32 0.00 - 0.51 K/uL    Baso (Absolute) 0.10 0.00 - 0.12 K/uL    Immature Granulocytes (abs) 0.11 0.00 - 0.11 K/uL    NRBC (Absolute) 0.00 K/uL   BASIC METABOLIC PANEL   Result Value Ref Range    Sodium 138 135 - 145 mmol/L    Potassium 4.2 3.6 - 5.5 mmol/L    Chloride 102 96 - 112 mmol/L    Co2 25 20 - 33 mmol/L    Glucose 96 65 - 99 mg/dL    Bun 11 8 - 22 mg/dL    Creatinine 0.76 0.50 - 1.40 mg/dL    Calcium 9.5 8.5 - 10.5 mg/dL    Anion Gap 11.0 7.0 - 16.0   HCG QUAL SERUM   Result Value Ref Range    Beta-Hcg Qualitative Serum Negative Negative   MONONUCLEOSIS TEST QUAL   Result Value Ref Range    Heterophile Screen Negative Negative   Group A Strep by PCR    Specimen: Throat; Blood   Result Value Ref Range    Group A Strep by PCR DETECTED (A) Not Detected   ESTIMATED GFR   Result Value Ref Range    GFR If African American >60 >60 mL/min/1.73 m 2    GFR If Non African American >60 >60 mL/min/1.73 m 2         No orders to display       COURSE & MEDICAL DECISION MAKING  Pertinent Labs & Imaging studies reviewed. (See chart for details)    Reviewed patient's old medical records which showed that the patient was here on May 29 and diagnosed with pharyngitis.  She was sent home with amoxicillin.    1:58 PM - Patient seen and examined at bedside. Discussed plan of care.    I verified that the patient was wearing a mask and I was wearing appropriate PPE every time I entered the room. The patient's mask was on the patient at all times during my encounter except for a brief view of the oropharynx.    Patient presents to the ER complaining of worsening sore throat on the right side of her throat which began this morning.  She was seen here on May 29 for sore throat and was diagnosed with strep pharyngitis.  She is placed on amoxicillin.  She has been taking her amoxicillin as prescribed.  She says she was feeling much better over the last few days, but this  morning when she woke up she noticed increased pain and swelling on the right side of her throat.  No fevers.  No chills.  She is not drooling.  No stridor.  No trismus.  She has some mild swelling in the right peritonsillar area but there is no distinct abscess at this time.  The uvula is midline.  Her voice is normal.  There is nothing drainable at this time.  Is possible she could be trying to develop a peritonsillar abscess, but at this time I think that medical management with IV Rocephin and IV Decadron is reasonable.  Given the fullness I am seeing in the peritonsillar area, I think this is the source of her discomfort.  I do not think there is any deep space neck abscess at this time.  No need for CT scan at this time.  Rapid strep is positive.  Mono is negative.  She has a fair bit of exudate over the right tonsil when compared to the left.  She is not septic or toxic.  Her vital signs are normal and stable.  At this time I think she is safe for discharge home.  I am, however, going to switch her from amoxicillin to Augmentin.  Additionally, she is going to come back tomorrow for recheck unless she is feeling significantly better and has much less swelling.  She is to return immediately for any worsening swelling, voice changes, fevers, drooling, difficulty swallowing fluids or saliva, or for any concerns.  At this time she is tolerating p.o.  She has been given very strict return precautions and discharge instructions and she understands treatment plan and follow-up.    FINAL IMPRESSION  1. Strep pharyngitis         This dictation has been created using voice recognition software. The accuracy of the dictation is limited by the abilities of the software. I expect there may be some errors of grammar and possibly content. I made every attempt to manually correct the errors within my dictation. However, errors related to voice recognition software may still exist and should be interpreted within the  appropriate context.     iNshant TORO (Scribe), am scribing for, and in the presence of, Tracee Sparks M.D..    Electronically signed by: Nishant Carter (Lisa), 6/3/2020    Tracee TORO M.D. personally performed the services described in this documentation, as scribed by Nishant Carter in my presence, and it is both accurate and complete. C.    The note accurately reflects work and decisions made by me.  Tracee Sparks M.D.  6/3/2020  2:23 PM

## 2020-06-03 NOTE — ED NOTES
Discharged to home with significant other. Verbalized understanding of all discharge instructions, education, medication, and follow-up care. Ambulated out of facility independently without difficulty.

## 2020-06-03 NOTE — DISCHARGE INSTRUCTIONS
Return to the ER tomorrow for a recheck.    Return to the ER immediately for any worsening swelling on the right side of your throat, difficulty swallowing fluids or saliva, muffling of the voice, fevers over 100.4, shaking chills, difficulty breathing, rash, nausea, vomiting, cough, headache, stiff neck, or for any concerns.    Gargle with warm salt water.  Suck on throat lozenges.  This will help with the swelling and the pain.    Drink plenty of fluids.    Follow-up with your primary care APRN within the next 1 to 2 days.  Please call this afternoon or first thing tomorrow morning for an appointment.    Take over-the-counter Tylenol and ibuprofen for discomfort.    At this time stop taking the amoxicillin and start taking the Augmentin.

## 2020-06-25 ENCOUNTER — OFFICE VISIT (OUTPATIENT)
Dept: URGENT CARE | Facility: CLINIC | Age: 25
End: 2020-06-25
Payer: COMMERCIAL

## 2020-06-25 ENCOUNTER — HOSPITAL ENCOUNTER (EMERGENCY)
Facility: MEDICAL CENTER | Age: 25
End: 2020-06-25
Attending: EMERGENCY MEDICINE | Admitting: EMERGENCY MEDICINE
Payer: COMMERCIAL

## 2020-06-25 VITALS
RESPIRATION RATE: 16 BRPM | TEMPERATURE: 98.2 F | DIASTOLIC BLOOD PRESSURE: 62 MMHG | SYSTOLIC BLOOD PRESSURE: 106 MMHG | HEART RATE: 52 BPM | OXYGEN SATURATION: 99 % | WEIGHT: 153 LBS | HEIGHT: 65 IN | BODY MASS INDEX: 25.49 KG/M2

## 2020-06-25 VITALS
OXYGEN SATURATION: 96 % | SYSTOLIC BLOOD PRESSURE: 125 MMHG | WEIGHT: 147.27 LBS | BODY MASS INDEX: 24.54 KG/M2 | HEART RATE: 65 BPM | RESPIRATION RATE: 15 BRPM | DIASTOLIC BLOOD PRESSURE: 81 MMHG | TEMPERATURE: 98.7 F | HEIGHT: 65 IN

## 2020-06-25 DIAGNOSIS — S05.00XA CORNEAL ABRASION, UNSPECIFIED LATERALITY, INITIAL ENCOUNTER: ICD-10-CM

## 2020-06-25 DIAGNOSIS — H57.12 ACUTE LEFT EYE PAIN: ICD-10-CM

## 2020-06-25 DIAGNOSIS — H57.89 RED EYE: ICD-10-CM

## 2020-06-25 PROCEDURE — 700101 HCHG RX REV CODE 250: Performed by: EMERGENCY MEDICINE

## 2020-06-25 PROCEDURE — 99213 OFFICE O/P EST LOW 20 MIN: CPT | Performed by: PHYSICIAN ASSISTANT

## 2020-06-25 PROCEDURE — 99283 EMERGENCY DEPT VISIT LOW MDM: CPT

## 2020-06-25 RX ORDER — PROPARACAINE HYDROCHLORIDE 5 MG/ML
1 SOLUTION/ DROPS OPHTHALMIC ONCE
Status: COMPLETED | OUTPATIENT
Start: 2020-06-25 | End: 2020-06-25

## 2020-06-25 RX ORDER — ERYTHROMYCIN 5 MG/G
OINTMENT OPHTHALMIC ONCE
Status: COMPLETED | OUTPATIENT
Start: 2020-06-25 | End: 2020-06-25

## 2020-06-25 RX ORDER — ERYTHROMYCIN 5 MG/G
1 OINTMENT OPHTHALMIC
Qty: 3.5 G | Refills: 0 | Status: SHIPPED | OUTPATIENT
Start: 2020-06-25 | End: 2020-10-19

## 2020-06-25 RX ADMIN — PROPARACAINE HYDROCHLORIDE 1 DROP: 5 SOLUTION/ DROPS OPHTHALMIC at 18:15

## 2020-06-25 RX ADMIN — ERYTHROMYCIN: 5 OINTMENT OPHTHALMIC at 19:24

## 2020-06-25 RX ADMIN — FLUORESCEIN SODIUM 1 MG: 1 STRIP OPHTHALMIC at 18:15

## 2020-06-25 ASSESSMENT — ENCOUNTER SYMPTOMS
EYE DISCHARGE: 1
HEADACHES: 0
SORE THROAT: 0
CHILLS: 0
EYE PAIN: 1
PHOTOPHOBIA: 1
BLURRED VISION: 1
COUGH: 0
EYE REDNESS: 1
FEVER: 0

## 2020-06-25 ASSESSMENT — FIBROSIS 4 INDEX
FIB4 SCORE: 0.24
FIB4 SCORE: 0.24

## 2020-06-25 NOTE — PROGRESS NOTES
"Subjective:   Karol Baca  is a 25 y.o. female who presents for Eye Pain (rt eye pain started last night, feels like theres something in it )    This is a new problem.  Patient presents to urgent care with sudden onset last evening of severe pain in the left eye.      Eye Pain        Review of Systems   Eyes: Positive for pain.     Allergies   Allergen Reactions   • Paxil [Paroxetine] Unspecified     Gave patient UTI that led to hospitalization   • Sulfa Drugs Hives     Reviewed past medical, surgical , social and family history.  Reviewed prescription and over-the-counter medications with patient and electronic health record today.     Objective:   /62   Pulse (!) 52   Temp 36.8 °C (98.2 °F) (Temporal)   Resp 16   Ht 1.651 m (5' 5\")   Wt 69.4 kg (153 lb)   SpO2 99%   BMI 25.46 kg/m²   Physical Exam      Assessment/Plan:   1. Acute left eye pain    2. Red eye     Differential diagnosis, natural history, supportive care, and indications for immediate follow-up discussed.     Red flag warning symptoms and strict ER/follow-up precautions given.  The patient demonstrated a good understanding and agreed with the treatment plan.  Please note that this note was created using voice recognition speech to text software. Every effort has been made to correct obvious errors.  However, I expect there are errors of grammar and possibly context that were not discovered prior to finalizing the note  REHAN Marina PA-C  "

## 2020-06-25 NOTE — PROGRESS NOTES
Subjective:   Karol Baca  is a 25 y.o. female who presents for Eye Pain (lt eye pain started last night, feels like theres something in it )    This is a new problem.  Patient presents to urgent care with sudden onset of severe pain in the left eye that began last evening.  Patient thought that this may be related to her contact lens and she took it out.  However, the pain has persisted with significant tearing from the left eye which has caused her nose to run.  Patient reports blurred vision and photosensitivity in the left eye only.  Denies any exposure to anyone with pinkeye.  Patient does not believe that the contact lens was torn.  Patient reports foreign body sensation.    Patient denies fever, chills, shaking chills cough, shortness of breath, runny nose, congestion, sore throat, headache, loss of taste or smell, myalgias, nausea, vomiting, diarrhea.  Patient denies any known exposure to patient positive for COVID-19 or suspected positive for COVID-19.  Patient has not traveled outside the area in the past 14 days.        Eye Pain    Associated symptoms include blurred vision, an eye discharge, eye redness and photophobia. Pertinent negatives include no fever.     Review of Systems   Constitutional: Negative for chills, fever and malaise/fatigue.   HENT: Negative for congestion, ear pain and sore throat.    Eyes: Positive for blurred vision, photophobia, pain, discharge and redness.   Respiratory: Negative for cough.    Neurological: Negative for headaches.   All other systems reviewed and are negative.    Allergies   Allergen Reactions   • Paxil [Paroxetine] Unspecified     Gave patient UTI that led to hospitalization   • Sulfa Drugs Hives     Reviewed past medical, surgical , social and family history.  Reviewed prescription and over-the-counter medications with patient and electronic health record today.     Objective:   /62   Pulse (!) 52   Temp 36.8 °C (98.2 °F) (Temporal)   Resp 16   Ht  "1.651 m (5' 5\")   Wt 69.4 kg (153 lb)   SpO2 99%   BMI 25.46 kg/m²   Physical Exam  Vitals signs reviewed.   Constitutional:       General: She is not in acute distress.     Appearance: She is well-developed. She is not ill-appearing or toxic-appearing.   HENT:      Head: Normocephalic and atraumatic.      Right Ear: Tympanic membrane, ear canal and external ear normal.      Left Ear: Tympanic membrane, ear canal and external ear normal.      Nose: Nose normal.      Mouth/Throat:      Lips: Pink. No lesions.      Mouth: Mucous membranes are moist.      Pharynx: Oropharynx is clear. Uvula midline. No oropharyngeal exudate.   Eyes:      General: Lids are normal. Lids are everted, no foreign bodies appreciated.         Left eye: Discharge present.No foreign body.      Extraocular Movements: Extraocular movements intact.      Conjunctiva/sclera:      Left eye: Left conjunctiva is injected.      Pupils: Pupils are equal, round, and reactive to light.      Left eye: No corneal abrasion or fluorescein uptake. Marleny exam negative.     Comments: Left eye with significant injection and tearing.  1 drop of fluorescein and proparacaine instilled into left eye.  Lid everted and swept with no foreign bodies noted.  No fluorescein uptake is noted with fluorescein staining.  Patient does exhibit photosensitivity but no consensual photosensitivity.   Neck:      Musculoskeletal: Normal range of motion and neck supple.   Cardiovascular:      Rate and Rhythm: Normal rate and regular rhythm.      Heart sounds: Normal heart sounds. No murmur. No friction rub. No gallop.    Pulmonary:      Effort: Pulmonary effort is normal. No respiratory distress.      Breath sounds: Normal breath sounds.   Musculoskeletal: Normal range of motion.         General: No tenderness or deformity.   Lymphadenopathy:      Head:      Right side of head: No submental, submandibular, tonsillar, preauricular or posterior auricular adenopathy.      Left side of " head: No submental, submandibular, tonsillar, preauricular or posterior auricular adenopathy.      Cervical: No cervical adenopathy.      Upper Body:      Right upper body: No supraclavicular adenopathy.      Left upper body: No supraclavicular adenopathy.   Skin:     General: Skin is warm and dry.      Findings: No rash.   Neurological:      Mental Status: She is alert and oriented to person, place, and time.      Cranial Nerves: Cranial nerves are intact. No cranial nerve deficit.      Sensory: Sensation is intact. No sensory deficit.      Motor: Motor function is intact.      Coordination: Coordination is intact. Coordination normal.      Gait: Gait is intact.   Psychiatric:         Attention and Perception: Attention normal.         Mood and Affect: Mood and affect normal.         Speech: Speech normal.         Behavior: Behavior normal. Behavior is cooperative.         Thought Content: Thought content normal.         Judgment: Judgment normal.           Assessment/Plan:   1. Acute left eye pain    2. Red eye     Patient was unable to perform visual acuity due to pain.  I do not appreciate any foreign body or corneal abrasions.  Patient is experiencing pain out of proportion to what I would anticipate and therefore I believe the patient warrants further evaluation at a higher level of care.  She warrants a more thorough in-depth slit-lamp examination as well as pressure check and unfortunately I amenable to accommodate that in urgent care setting and would not be able to coordinate getting her in to see someone a given the late hour of the day.  Therefore, I have encouraged the patient to present immediately to the emergency department for further evaluation and management.  The patient is here with a family member who will drive her.    Upon entering exam room I ensured patient was wearing a mask.  This provider wore a mask for the entire visit.  Patient wore mask entire visit except for a brief period while  examining oropharynx.      Differential diagnosis, natural history, supportive care, and indications for immediate follow-up discussed.     Red flag warning symptoms and strict ER/follow-up precautions given.  The patient demonstrated a good understanding and agreed with the treatment plan.  Please note that this note was created using voice recognition speech to text software. Every effort has been made to correct obvious errors.  However, I expect there are errors of grammar and possibly context that were not discovered prior to finalizing the note  REHAN Marina PA-C

## 2020-06-26 NOTE — ED NOTES
Visual acuity tested.     OS (R) = 20/30    OD (L) =20/50    OU (B) = 20/40      Per pt she usually wears contact lenses, but due to irritation she is not able to wear them right now/does not have them with her. Test done without the correctional lenses. L eye was noted to be injected.

## 2020-06-26 NOTE — ED NOTES
Patient verbalizes understanding of follow up, pain management, medications, and when to return to the ED.  Patient discharged with friend.  All questions answered

## 2020-06-26 NOTE — ED NOTES
Patient resting on cart, awake, alert, oriented x 4. Patient updated on POC by ERP, verbalizes understanding. Assessment unchanged. Denies needs or questions, call light within reach, will continue to monitor.

## 2020-06-26 NOTE — ED PROVIDER NOTES
ED Provider Note    CHIEF COMPLAINT  Chief Complaint   Patient presents with   • Eye Pain     left, and redness, since last night   • Sent from Urgent Care       HPI  Karol Bcaa is a 25 y.o. female who presents with complaint of left eye pain, redness since last night.  She states that she was taking out her contact lens last night and media scratched her eye.  In addition, she [place]  She does utilize the contacts daily and takes an hour every day.  She denies loss of vision, significant discharge.  It feels like there is something scratching her eye.  She went to urgent care earlier today was sent to our facility for further evaluation and management.  REVIEW OF SYSTEMS  Pertinent positives include left eye pain and redness with slight discharge  Pertinent negatives include visual loss, visual changes, fever, headache, trauma  PAST MEDICAL HISTORY  Past Medical History:   Diagnosis Date   • Anxiety    • Endometriosis    • Migraine    • Recurrent major depressive disorder, in partial remission ([facility]) 9/22/2017   • Unspecified asthma(493.90)     Exercise-induced        FAMILY HISTORY  Family History   Problem Relation Age of Onset   • Stroke Maternal Grandmother    • Other Maternal Grandmother         PCOS   • Psychiatric Illness Maternal Grandmother    • Cancer Maternal Grandfather         Prostate   • Heart Disease Paternal Grandmother    • Cancer Paternal Grandmother    • Psychiatric Illness Mother    • Heart Disease Father    • No Known Problems Brother    • No Known Problems Brother    • No Known Problems Brother        SOCIAL HISTORY  Social History     Socioeconomic History   • Marital status: Single     Spouse name: Not on file   • Number of children: Not on file   • Years of education: Not on file   • Highest education level: Not on file   Occupational History     Comment: [employer]   Social Needs   • Financial resource strain: Not on file    • Food insecurity     Worry: Not on file     Inability: Not on file   • Transportation needs     Medical: Not on file     Non-medical: Not on file   Tobacco Use   • Smoking status: Current Every Day Smoker     Packs/day: 0.75     Years: 7.00     Pack years: 5.25     Types: Cigarettes     Last attempt to quit: 9/15/2017     Years since quittin.7   • Smokeless tobacco: Never Used   Substance and Sexual Activity   • Alcohol use: Not Currently     Frequency: Never   • Drug use: Yes     Comment: marijuana   • Sexual activity: Yes     Partners: Male     Birth control/protection: Condom     Comment: Monogomous relationship   Lifestyle   • Physical activity     Days per week: Not on file     Minutes per session: Not on file   • Stress: Not on file   Relationships   • Social connections     Talks on phone: Not on file     Gets together: Not on file     Attends [employer] service: Not on file     Active member of club or organization: Not on file     Attends meetings of clubs or organizations: Not on file     Relationship status: Not on file   • Intimate partner violence     Fear of current or ex partner: Not on file     Emotionally abused: Not on file     Physically abused: Not on file     Forced sexual activity: Not on file   Other Topics Concern   • Not on file   Social History Narrative   • Not on file       SURGICAL HISTORY  Past Surgical History:   Procedure Laterality Date   • GYN SURGERY      exploratory lap   • OTHER ORTHOPEDIC SURGERY Right     frx arm   • WIDE EXCISION MELANOMA, LEG, W/POSS.STSG         CURRENT MEDICATIONS  Home Medications     Reviewed by Kaelyn Tucker R.N. (Registered Nurse) on 20 at 1700  Med List Status: <None>   Medication Last Dose Status   citalopram (CELEXA) 10 MG tablet  Active   prazosin (MINIPRESS) 1 MG Cap  Active   predniSONE (DELTASONE) 10 MG Tab  Active                ALLERGIES  Allergies   Allergen Reactions   • Paxil [Paroxetine] Unspecified     Gave patient UTI  "that led to hospitalization   • Sulfa Drugs Hives       PHYSICAL EXAM  VITAL SIGNS: /81   Pulse 65   Temp 37.1 °C (98.7 °F) (Temporal)   Resp 15   Ht 1.651 m (5' 5\")   Wt 66.8 kg (147 lb 4.3 oz)   LMP 06/18/2020   SpO2 96%   BMI 24.51 kg/m²      Constitutional: Well developed, Well nourished, No acute distress, Non-toxic appearance.     Eyes: PERRLA, EOMI, left eye conjunctival is injected and sclera is injected, slit light examination reveals a small abrasion fluorescein uptake to the corneal approximately at 7:00, there is no evidence dendritic lesion, anterior chamber is normal, no high propionic, no hyphema, no discharge   Skin: Warm, Dry, No erythema, No rash.       COURSE & MEDICAL DECISION MAKING  Pertinent Labs & Imaging studies reviewed. (See chart for details)  This is a pleasant 25-year-old female presents with corneal abrasion left eye apply secondary to removing her contact lens.  She has no antigenic lesion or ulceration.  She received erythromycin ointment and was instructed return to the emergency department she has increasing symptomatology or to follow-up with ophthalmology for further evaluation as well as needed.    Discharge Medication List as of 6/25/2020  7:21 PM      START taking these medications    Details   erythromycin 5 MG/GM Ointment Place 1 Application in both eyes every bedtime., Disp-3.5 g,R-0, Normal               FINAL IMPRESSION     1. Corneal abrasion, unspecified laterality, initial encounter          DISPOSITION:  Patient will be discharged home in stable condition.    FOLLOW UP:  [facility], Emergency Dept  [address] [facility] [address] [phone]    If symptoms worsen    Patrick Martinez M.D.  [address]  [place] [address]  [phone]    Schedule an appointment as soon as possible for a visit   As needed, If symptoms worsen      OUTPATIENT MEDICATIONS:  Discharge Medication List as of 6/25/2020  7:21 PM      START " taking these medications    Details   erythromycin 5 MG/GM Ointment Place 1 Application in both eyes every bedtime., Disp-3.5 g,R-0, Normal               Electronically signed by: Jacky Faustin D.O., 6/25/2020 6:52 PM

## 2020-06-26 NOTE — ED TRIAGE NOTES
Karol Baca  Chief Complaint   Patient presents with   • Eye Pain     left, and redness, since last night   • Sent from Urgent Care     Pt ambulatory to triage with above complaint.  VSS, no acute distress.  Pt states that it feels like there is something cutting into her eye.  +clear drainage.   Pt denies fever/ cough or contact with anyone positive for Covid/ Corona.  Pt/staff masked and in appropriate PPE during encounter.   Pt returned to lobby, educated on triage process, and to inform staff of any changes or concerns.

## 2020-06-26 NOTE — ED NOTES
Pt ambulates to ED room 67 with steady, upright gait. Updated on POC & pending eval. Verbalizes understanding. Will continue to monitor.

## 2020-10-19 ENCOUNTER — OFFICE VISIT (OUTPATIENT)
Dept: MEDICAL GROUP | Facility: IMAGING CENTER | Age: 25
End: 2020-10-19
Payer: COMMERCIAL

## 2020-10-19 VITALS
OXYGEN SATURATION: 99 % | WEIGHT: 135.4 LBS | DIASTOLIC BLOOD PRESSURE: 62 MMHG | SYSTOLIC BLOOD PRESSURE: 116 MMHG | BODY MASS INDEX: 22.56 KG/M2 | HEIGHT: 65 IN | HEART RATE: 55 BPM | RESPIRATION RATE: 14 BRPM | TEMPERATURE: 98.5 F

## 2020-10-19 DIAGNOSIS — N92.6 MISSED PERIOD: ICD-10-CM

## 2020-10-19 DIAGNOSIS — F33.42 RECURRENT MAJOR DEPRESSIVE DISORDER, IN FULL REMISSION (HCC): ICD-10-CM

## 2020-10-19 DIAGNOSIS — Z32.01 PREGNANCY TEST PERFORMED, PREGNANCY CONFIRMED: ICD-10-CM

## 2020-10-19 LAB
INT CON NEG: NEGATIVE
INT CON POS: POSITIVE
POC URINE PREGNANCY TEST: POSITIVE

## 2020-10-19 PROCEDURE — 99213 OFFICE O/P EST LOW 20 MIN: CPT | Performed by: NURSE PRACTITIONER

## 2020-10-19 PROCEDURE — 81025 URINE PREGNANCY TEST: CPT | Performed by: NURSE PRACTITIONER

## 2020-10-19 SDOH — ECONOMIC STABILITY: FOOD INSECURITY: WITHIN THE PAST 12 MONTHS, THE FOOD YOU BOUGHT JUST DIDN'T LAST AND YOU DIDN'T HAVE MONEY TO GET MORE.: NEVER TRUE

## 2020-10-19 SDOH — SOCIAL STABILITY: SOCIAL NETWORK
DO YOU BELONG TO ANY CLUBS OR ORGANIZATIONS SUCH AS CHURCH GROUPS UNIONS, FRATERNAL OR ATHLETIC GROUPS, OR SCHOOL GROUPS?: NO

## 2020-10-19 SDOH — SOCIAL STABILITY: SOCIAL NETWORK: HOW OFTEN DO YOU ATTEND CHURCH OR RELIGIOUS SERVICES?: NEVER

## 2020-10-19 SDOH — ECONOMIC STABILITY: TRANSPORTATION INSECURITY
IN THE PAST 12 MONTHS, HAS THE LACK OF TRANSPORTATION KEPT YOU FROM MEDICAL APPOINTMENTS OR FROM GETTING MEDICATIONS?: NO

## 2020-10-19 SDOH — HEALTH STABILITY: MENTAL HEALTH: HOW MANY STANDARD DRINKS CONTAINING ALCOHOL DO YOU HAVE ON A TYPICAL DAY?: PATIENT DECLINED

## 2020-10-19 SDOH — ECONOMIC STABILITY: INCOME INSECURITY: HOW HARD IS IT FOR YOU TO PAY FOR THE VERY BASICS LIKE FOOD, HOUSING, MEDICAL CARE, AND HEATING?: SOMEWHAT HARD

## 2020-10-19 SDOH — ECONOMIC STABILITY: INCOME INSECURITY: IN THE LAST 12 MONTHS, WAS THERE A TIME WHEN YOU WERE NOT ABLE TO PAY THE MORTGAGE OR RENT ON TIME?: NO

## 2020-10-19 SDOH — HEALTH STABILITY: MENTAL HEALTH: HOW OFTEN DO YOU HAVE 6 OR MORE DRINKS ON ONE OCCASION?: NEVER

## 2020-10-19 SDOH — ECONOMIC STABILITY: HOUSING INSECURITY: IN THE LAST 12 MONTHS, HOW MANY PLACES HAVE YOU LIVED?: 1

## 2020-10-19 SDOH — ECONOMIC STABILITY: HOUSING INSECURITY
IN THE LAST 12 MONTHS, WAS THERE A TIME WHEN YOU DID NOT HAVE A STEADY PLACE TO SLEEP OR SLEPT IN A SHELTER (INCLUDING NOW)?: NO

## 2020-10-19 SDOH — SOCIAL STABILITY: SOCIAL NETWORK: HOW OFTEN DO YOU ATTENT MEETINGS OF THE CLUB OR ORGANIZATION YOU BELONG TO?: NEVER

## 2020-10-19 SDOH — HEALTH STABILITY: MENTAL HEALTH
STRESS IS WHEN SOMEONE FEELS TENSE, NERVOUS, ANXIOUS, OR CAN'T SLEEP AT NIGHT BECAUSE THEIR MIND IS TROUBLED. HOW STRESSED ARE YOU?: NOT AT ALL

## 2020-10-19 SDOH — HEALTH STABILITY: PHYSICAL HEALTH: ON AVERAGE, HOW MANY DAYS PER WEEK DO YOU ENGAGE IN MODERATE TO STRENUOUS EXERCISE (LIKE A BRISK WALK)?: 4 DAYS

## 2020-10-19 SDOH — ECONOMIC STABILITY: FOOD INSECURITY: WITHIN THE PAST 12 MONTHS, YOU WORRIED THAT YOUR FOOD WOULD RUN OUT BEFORE YOU GOT MONEY TO BUY MORE.: NEVER TRUE

## 2020-10-19 SDOH — SOCIAL STABILITY: SOCIAL NETWORK
IN A TYPICAL WEEK, HOW MANY TIMES DO YOU TALK ON THE PHONE WITH FAMILY, FRIENDS, OR NEIGHBORS?: MORE THAN THREE TIMES A WEEK

## 2020-10-19 SDOH — ECONOMIC STABILITY: TRANSPORTATION INSECURITY
IN THE PAST 12 MONTHS, HAS LACK OF RELIABLE TRANSPORTATION KEPT YOU FROM MEDICAL APPOINTMENTS, MEETINGS, WORK OR FROM GETTING THINGS NEEDED FOR DAILY LIVING?: NO

## 2020-10-19 SDOH — SOCIAL STABILITY: SOCIAL NETWORK: ARE YOU MARRIED, WIDOWED, DIVORCED, SEPARATED, NEVER MARRIED, OR LIVING WITH A PARTNER?: NEVER MARRIED

## 2020-10-19 SDOH — HEALTH STABILITY: PHYSICAL HEALTH: ON AVERAGE, HOW MANY MINUTES DO YOU ENGAGE IN EXERCISE AT THIS LEVEL?: 70 MINUTES

## 2020-10-19 SDOH — SOCIAL STABILITY: SOCIAL NETWORK: HOW OFTEN DO YOU GET TOGETHER WITH FRIENDS OR RELATIVES?: ONCE A WEEK

## 2020-10-19 SDOH — HEALTH STABILITY: PHYSICAL HEALTH: ON AVERAGE, HOW MANY MINUTES DO YOU ENGAGE IN EXERCISE AT THIS LEVEL?: 70 MIN

## 2020-10-19 SDOH — ECONOMIC STABILITY: TRANSPORTATION INSECURITY
IN THE PAST 12 MONTHS, HAS LACK OF TRANSPORTATION KEPT YOU FROM MEETINGS, WORK, OR FROM GETTING THINGS NEEDED FOR DAILY LIVING?: NO

## 2020-10-19 ASSESSMENT — ANXIETY QUESTIONNAIRES
4. TROUBLE RELAXING: NOT AT ALL
5. BEING SO RESTLESS THAT IT IS HARD TO SIT STILL: NOT AT ALL
7. FEELING AFRAID AS IF SOMETHING AWFUL MIGHT HAPPEN: NOT AT ALL
1. FEELING NERVOUS, ANXIOUS, OR ON EDGE: SEVERAL DAYS
2. NOT BEING ABLE TO STOP OR CONTROL WORRYING: NOT AT ALL
3. WORRYING TOO MUCH ABOUT DIFFERENT THINGS: SEVERAL DAYS
6. BECOMING EASILY ANNOYED OR IRRITABLE: NOT AT ALL
GAD7 TOTAL SCORE: 2

## 2020-10-19 ASSESSMENT — SOCIAL DETERMINANTS OF HEALTH (SDOH)
HOW HARD IS IT FOR YOU TO PAY FOR THE VERY BASICS LIKE FOOD, HOUSING, MEDICAL CARE, AND HEATING?: SOMEWHAT HARD
IN A TYPICAL WEEK, HOW MANY TIMES DO YOU TALK ON THE PHONE WITH FAMILY, FRIENDS, OR NEIGHBORS?: MORE THAN THREE TIMES A WEEK
WITHIN THE PAST 12 MONTHS, THE FOOD YOU BOUGHT JUST DIDN'T LAST AND YOU DIDN'T HAVE MONEY TO GET MORE: NEVER TRUE
WITHIN THE PAST 12 MONTHS, YOU WORRIED THAT YOUR FOOD WOULD RUN OUT BEFORE YOU GOT THE MONEY TO BUY MORE: NEVER TRUE
HOW OFTEN DO YOU HAVE A DRINK CONTAINING ALCOHOL: NEVER
HOW OFTEN DO YOU GET TOGETHER WITH FRIENDS OR RELATIVES?: ONCE A WEEK
ARE YOU MARRIED, WIDOWED, DIVORCED, SEPARATED, NEVER MARRIED, OR LIVING WITH A PARTNER?: NEVER MARRIED
DO YOU BELONG TO ANY CLUBS OR ORGANIZATIONS SUCH AS CHURCH GROUPS UNIONS, FRATERNAL OR ATHLETIC GROUPS, OR SCHOOL GROUPS?: NO
HOW OFTEN DO YOU ATTEND CHURCH OR RELIGIOUS SERVICES?: NEVER
HOW MANY DRINKS CONTAINING ALCOHOL DO YOU HAVE ON A TYPICAL DAY WHEN YOU ARE DRINKING: DECLINE
HOW OFTEN DO YOU HAVE SIX OR MORE DRINKS ON ONE OCCASION: NEVER
HOW OFTEN DO YOU ATTENT MEETINGS OF THE CLUB OR ORGANIZATION YOU BELONG TO?: NEVER

## 2020-10-19 ASSESSMENT — FIBROSIS 4 INDEX: FIB4 SCORE: 0.24

## 2020-10-19 ASSESSMENT — PATIENT HEALTH QUESTIONNAIRE - PHQ9: CLINICAL INTERPRETATION OF PHQ2 SCORE: 0

## 2020-10-19 NOTE — PROGRESS NOTES
Subjective:   CC: Establish Care, Pregnancy, and Nausea    HISTORY OF THE PRESENT ILLNESS: Patient is a 25 y.o. female. Her prior PCP was JOCELIN Brown, last seen 2/2020.  Patient has history of endometriosis, asthma, and migraine with aura. Patient is here today to establish care and discuss pregnancy     States she has completed a at home urine pregnancy test after missing her menstrual cycle and it was positive. States that her  and her have been trying to conceive since July. States her last LMP was 9/6/2020. States she has been nauseated in the am with intermittent vomiting. States she has also been experiencing constipation intermittently. States she started a PNV about 2 weeks ago. States she is currently taking tablet form, but thinking about starting a gummy form due to be nauseated often. States her nausea is slightly improving. States she is normally physically active, but has stopped due to concerns that it will affect her pregnancy. States she has an OB/GYN appointment scheduled for 11/6/2020.     Recurrent major depressive disorder, in full remission (HCC)  Denies any depressive thoughts at this time. Denies any concern at this time. States she has continued to see a counselor as needed. States she is seeing someone about every 3 months.    Depression Screening  Little interest or pleasure in doing things?  0 - not at all  Feeling down, depressed , or hopeless? 0 - not at all  Patient Health Questionnaire Score: 0    If depressive symptoms identified deferred to follow up visit unless specifically addressed in assesment and plan.    Interpretation of PHQ-9 Total Score   Score Severity   1-4 Minimal Depression   5-9 Mild Depression   10-14 Moderate Depression   15-19 Moderately Severe Depression   20-27 Severe Depression    ALBARO-7 Questionnaire  Feeling nervous, anxious, or on edge: Several days  Not being able to sop or control worrying: Not at all  Worrying too much about different  things: Several days  Trouble relaxing: Not at all  Being so restless that it's hard to sit still: Not at all  Becoming easily annoyed or irritable: Not at all  Feeling afraid as if something awful might happen: Not at all  Total: 2    Interpretation of ALBARO 7 Total Score   Score Severity :  0-4 No Anxiety   5-9 Mild Anxiety  10-14 Moderate Anxiety  15-21 Severe Anxiety        Allergies: Paxil [paroxetine] and Sulfa drugs    No current [facility]-ordered outpatient medications on file.     No current [facility]-ordered facility-administered medications on file.      Past Medical History:   Diagnosis Date   • Anxiety    • Endometriosis    • Left wrist pain 2020   • Migraine    • Recurrent major depressive disorder, in partial remission (HCC) 2017   • Unspecified asthma(493.90)     Exercise-induced      Past Surgical History:   Procedure Laterality Date   • GYN SURGERY      exploratory lap   • OTHER ORTHOPEDIC SURGERY Right     frx arm   • WIDE EXCISION MELANOMA, LEG, W/POSS.STSG       Social History     Tobacco Use   • Smoking status: Former Smoker     Packs/day: 0.75     Years: 7.00     Pack years: 5.25     Types: Cigarettes     Quit date: 2020     Years since quittin.2   • Smokeless tobacco: Never Used   Substance Use Topics   • Alcohol use: Not Currently     Frequency: Never     Drinks per session: Patient refused     Binge frequency: Never   • Drug use: Not Currently     Comment: marijuana     Social History     Social History Narrative   • Not on file     Family History   Problem Relation Age of Onset   • Stroke Maternal Grandmother    • Other Maternal Grandmother         PCOS   • Psychiatric Illness Maternal Grandmother    • Cancer Maternal Grandfather         Prostate   • Heart Disease Paternal Grandmother    • Cancer Paternal Grandmother    • Psychiatric Illness Mother    • Heart Disease Father    • No Known Problems Brother    • No Known Problems Brother    • No Known Problems Brother      Health  "Maintenance: Completed.    ROS:   Constitutional: Denies fever, chills, night sweats, weight loss and/or malaise.  Fatigue and weight gain, see HPI.  HENT: Denies nasal congestion, sore throat, hearing loss, enlarged thyroid, or difficulty swallowing.    Eyes: Denies changes in vision, pain. Does wear corrective wear, glasses  Respiratory: Denies cough, SOB at rest or activity.    Cardiovascular: Denies tachycardia, chest pain, palpitations, or  leg swelling.   Gastrointestinal: Denies diarrhea, abdominal pain, loss appetite, reflux, or hematochezia. Nausea, vomiting, and constipation, see HPI.  Genitourinary: Denies difficulty voiding, dysuria, nocturia, or hematuria.   Skin: Negative for rash or worrisome moles.   Neurological: Negative for dizziness, focal weakness and headaches.   Endo/Heme/Allergies: Denies bruise/bleed easily, allergies.   Psychiatric/Behavioral: Denies nervous/anxious, difficulty sleeping. History of depression, see HPI.     Objective:   Exam: /62 (BP Location: Left arm, Patient Position: Sitting, BP Cuff Size: Adult)   Pulse (!) 55   Temp 36.9 °C (98.5 °F) (Temporal)   Resp 14   Ht 1.651 m (5' 5\")   Wt 61.4 kg (135 lb 6.4 oz)   SpO2 99%  Body mass index is 22.53 kg/m².    General: Normal appearing. No distress.  HEENT: Normocephalic. Eyes conjunctiva clear lids without ptosis, PERRLA, ears normal shape and contour, canals are clear bilaterally, tympanic membranes pearly gray, intact, non-bulging, no drainage noted. Oral and nasal examine deferred due to current COVID-19 outbreak, no acute concerns at this time. Patient wore a mask during visit.  Neck: Supple without JVD or abnormal masses. Small soft mobile thyroid palpated, no nodules palpated, non-tender.  Pulmonary: Clear to ausculation.  Normal effort. No rales, ronchi, or wheezing.  Cardiovascular: Regular rate and rhythm without murmur. Pedal and radial pulses are intact and equal bilaterally.  Abdomen: Soft, nontender, " nondistended. Normal bowel sounds. Liver and spleen are not palpable.  Neurologic: Grossly non-focal.  Lymph: No cervical, submandibular, or supraclavicular lymph nodes are palpable.  Skin: Warm and dry.  No obvious lesions.  Musculoskeletal: Normal gait. No extremity cyanosis, clubbing, or edema.  Psych: Normal mood and affect. Alert and oriented x3. Judgment and insight is normal.    Assessment & Plan:   1. Missed period  2. Pregnancy test performed, pregnancy confirmed  This is a stable condition. Reviewed POCT pregnancy test with patient, verbalized understanding and expressed excitement. Discussed with patient maintaining hydration with room temperature water and eating small frequent meals to avoid nausea. Discussed switching PNV from tablet form to gummy form and to take in afternoon/pm with a meal to help decrease am nausea. Handout given to patient for OTC remedies to decrease nausea, verbalized understanding. Discussed remaining physically active, but avoiding high impact sport activity, verbalized understanding. Discussed starting colace -200 mg as needed to avoid constipation, but discussed this is a normal finding in pregnancy. Discussed keeping OB/GYN appointment to establish care. Instructed to check with PCP and/or OB/GYN before taking medication to check for safety during pregnancy. Discussed only using Tylenol at this time instead of ibuprofen to treat migraine if they occur, verbalized understanding. Encouraged patient to wash hands regularly and avoid sick contacts while supporting immune system.  Discussed appropriate social distancing and isolation with patient to decrease risk of COVID-19, verbalized understanding.    -     POCT Pregnancy    3. Recurrent major depressive disorder, in full remission (HCC)  This is a chronic stable condition. Discussed reaching out to PCP if she has any future concerns and discussed increased risk of postpartum depression due to history, verbalized  understanding and willingness.     Return in about 6 months (around 4/19/2021).    Please note that this dictation was created using voice recognition software. I have made every reasonable attempt to correct obvious errors, but I expect that there are errors of grammar and possibly content that I did not discover before finalizing the note.

## 2020-10-19 NOTE — PATIENT INSTRUCTIONS
"Patient education: Morning sickness (The Basics)  Written by the doctors and editors at [facility]    What is morning sickness? -- Morning sickness is the nausea and vomiting (throwing up) that many women have during pregnancy. Symptoms can be mild or severe. Even though it is called \"morning\" sickness, symptoms can happen any time of day. In fact, most women who have the condition feel sick all day long. Morning sickness usually gets better after the first few months of pregnancy.    Do all pregnant women get morning sickness? -- No. But it's very common. As many as 9 out of 10 pregnant women feel nauseous early in pregnancy. A smaller number actually throw up.  A very small number of pregnant women get very severe nausea and vomiting and lose weight. This is called \"hyperemesis gravidarum.\" (See \"Patient education: Hyperemesis gravidarum (The Basics)\".)    When does morning sickness happen? -- That depends on the woman and the pregnancy. Symptoms usually start during the first 2 months of pregnancy. They are often worst around the second and third months. Most women feel better by 4 or 5 months, or around the middle of pregnancy. But some women feel bad for much longer.    What causes morning sickness? -- Doctors aren't sure why pregnancy sometimes causes nausea and vomiting, or why some women have more nausea and vomiting than others.    Should I see a doctor or nurse? -- See your doctor or nurse right away if you:  ?Throw up every day or throw up over and over during the day. This is even more of a concern if there is blood in your vomit.  ?Are losing weight  ?Have pain or cramps in your belly  ?Think you have lost too many fluids. This is called \"dehydration.\" Signs include not urinating very much, having dark yellow urine, or feeling dizzy when you stand up.  If you can't keep anything down, you might need to be given fluids through a thin tube that goes into a vein, called an \"IV.\" Plus, you might need to get a " medicine to prevent nausea and vomiting.    Is there anything I can do on my own to feel better? -- Yes. If your symptoms aren't too serious, here are some things you can try:  ?Eat as soon as you feel hungry, or even before you feel hungry  ?Snack often and eat small meals - The best foods to eat have lots of protein or carbohydrates, but not a lot of fat. Good choices are crackers, bread, and low-fat yogurt. You should also avoid spicy foods.  ?Drink cold, clear beverages that are either fizzy or sour - Good choices are lemonade and ginger ale.  ?Suck on ginger-flavored lollipops  ?Smell fresh lemon, mint, or orange  ?Brush your teeth right after you eat  ?Do not lie down right after you eat  ?Take your vitamins at bedtime with a snack, not in the morning  ?Avoid things that make you feel nauseous - That might include stuffy rooms, strong smells, hot places, loud noises, or not sleeping enough. Try to figure out if some foods and drinks stay down better than others. Avoid foods and drinks that seem to make you feel sick. This is different for different people.  Are there medicines I can take? -- Yes. There are medicines that can help with nausea and vomiting. Some are safe to take while pregnant. Talk with your doctor before taking anything.    Medicines that might help include:  ?Doxylamine-pyridoxine (brand names: Diclegis, Bonjesta) - This is a prescription medicine that combines 2 ingredients, doxylamine and pyridoxine (vitamin B6).  In some cases, doctors might suggest taking vitamin B6 alone, or with doxylamine. Doxylamine is sold over-the-counter under different brand names.  ?Diphenhydramine (sample brand name: Benadryl) and meclizine (sample brand name: Dramamine) - These medicines are similar to doxylamine, and are sold over-the-counter under different brand names. Your doctor might suggest trying them. They can make you sleepy.  ?Other medicines - There are other medicines that can sometimes be used if  "the above don't work. Your doctor can help you figure out what treatments are best for you.  There are also bands that you can wear on your wrists called \"acupressure\" bands. These bands are supposed to reduce morning or motion sickness. Some women feel better if they wear them.    Can morning sickness be prevented? -- Doctors urge all women who might get pregnant or who are pregnant to take multivitamins. The multivitamin should contain 400 micrograms of folic acid. Taking multivitamins before pregnancy and in early pregnancy might decrease nausea and vomiting. The multivitamins also help to prevent some birth defects.    Will morning sickness hurt my baby? -- Probably not. It is OK to gain weight slowly in the beginning of your pregnancy. Most women should gain between 25 and 35 pounds by the end. Even women with the worst symptoms usually have healthy babies.    "

## 2020-10-22 PROBLEM — F33.9 RECURRENT MAJOR DEPRESSION (HCC): Status: ACTIVE | Noted: 2017-09-22

## 2020-10-22 PROBLEM — M25.532 LEFT WRIST PAIN: Status: RESOLVED | Noted: 2020-04-06 | Resolved: 2020-10-22

## 2020-10-22 PROBLEM — F33.42 RECURRENT MAJOR DEPRESSIVE DISORDER, IN FULL REMISSION (HCC): Status: ACTIVE | Noted: 2017-09-22

## 2020-10-22 NOTE — ASSESSMENT & PLAN NOTE
Denies any depressive thoughts at this time. Denies any concern at this time. States she has continued to see a counselor as needed. States she is seeing someone about every 3 months.    Depression Screening  Little interest or pleasure in doing things?  0 - not at all  Feeling down, depressed , or hopeless? 0 - not at all  Patient Health Questionnaire Score: 0    If depressive symptoms identified deferred to follow up visit unless specifically addressed in assesment and plan.    Interpretation of PHQ-9 Total Score   Score Severity   1-4 Minimal Depression   5-9 Mild Depression   10-14 Moderate Depression   15-19 Moderately Severe Depression   20-27 Severe Depression    ALBARO-7 Questionnaire  Feeling nervous, anxious, or on edge: Several days  Not being able to sop or control worrying: Not at all  Worrying too much about different things: Several days  Trouble relaxing: Not at all  Being so restless that it's hard to sit still: Not at all  Becoming easily annoyed or irritable: Not at all  Feeling afraid as if something awful might happen: Not at all  Total: 2    Interpretation of ALBARO 7 Total Score   Score Severity :  0-4 No Anxiety   5-9 Mild Anxiety  10-14 Moderate Anxiety  15-21 Severe Anxiety

## 2020-11-06 ENCOUNTER — INITIAL PRENATAL (OUTPATIENT)
Dept: OBGYN | Facility: CLINIC | Age: 25
End: 2020-11-06
Payer: COMMERCIAL

## 2020-11-06 VITALS — BODY MASS INDEX: 21.97 KG/M2 | WEIGHT: 132 LBS | SYSTOLIC BLOOD PRESSURE: 120 MMHG | DIASTOLIC BLOOD PRESSURE: 66 MMHG

## 2020-11-06 DIAGNOSIS — J45.909 ASTHMA AFFECTING PREGNANCY IN FIRST TRIMESTER: ICD-10-CM

## 2020-11-06 DIAGNOSIS — N91.2 AMENORRHEA: ICD-10-CM

## 2020-11-06 DIAGNOSIS — O99.511 ASTHMA AFFECTING PREGNANCY IN FIRST TRIMESTER: ICD-10-CM

## 2020-11-06 PROCEDURE — 99213 OFFICE O/P EST LOW 20 MIN: CPT | Mod: 25 | Performed by: OBSTETRICS & GYNECOLOGY

## 2020-11-06 PROCEDURE — 76830 TRANSVAGINAL US NON-OB: CPT | Performed by: OBSTETRICS & GYNECOLOGY

## 2020-11-06 ASSESSMENT — EDINBURGH POSTNATAL DEPRESSION SCALE (EPDS)
I HAVE BEEN SO UNHAPPY THAT I HAVE HAD DIFFICULTY SLEEPING: NOT AT ALL
I HAVE BEEN SO UNHAPPY THAT I HAVE BEEN CRYING: NO, NEVER
I HAVE FELT SAD OR MISERABLE: NO, NOT AT ALL
THINGS HAVE BEEN GETTING ON TOP OF ME: NO, I HAVE BEEN COPING AS WELL AS EVER
I HAVE BEEN ABLE TO LAUGH AND SEE THE FUNNY SIDE OF THINGS: AS MUCH AS I ALWAYS COULD
TOTAL SCORE: 2
THE THOUGHT OF HARMING MYSELF HAS OCCURRED TO ME: NEVER
I HAVE BEEN ANXIOUS OR WORRIED FOR NO GOOD REASON: HARDLY EVER
I HAVE LOOKED FORWARD WITH ENJOYMENT TO THINGS: AS MUCH AS I EVER DID
I HAVE BLAMED MYSELF UNNECESSARILY WHEN THINGS WENT WRONG: NOT VERY OFTEN
I HAVE FELT SCARED OR PANICKY FOR NO GOOD REASON: NO, NOT AT ALL

## 2020-11-06 NOTE — PROGRESS NOTES
Pt here today for   Pt states denies VB and LOF  Reports +FM  Good # [phone]  Pharmacy Confirmed.

## 2020-11-06 NOTE — PROGRESS NOTES
Chief complaint: Secondary menorrhea    Karol Baca,  25 y.o.  female with Patient's last menstrual period was 09/10/2020. presents today with complaint of dysfunctional uterine bleeding.  She is approximately 11 weeks and 6 days by her last menstrual period.     Subjective : Patient presents to the office for absent menses.    Nausea/Vomiting: No    Abdominal /pelvic cramping: No  Vaginal bleeding: No  Positive home UPT yes  Other symptoms: No    Pertinent positives documented in HPI and all other systems reviewed & are negative    OB History    Para Term  AB Living   2       1     SAB TAB Ectopic Molar Multiple Live Births   1                # Outcome Date GA Lbr Obdulio/2nd Weight Sex Delivery Anes PTL Lv   2 Current            1 SAB                Past Gyn history: last pap 2018 showing normal Pap with positive HPV., hx STDs chlamydia age 19    Past Medical History:   Diagnosis Date   • Anxiety    • Endometriosis    • Left wrist pain 2020   • Migraine    • Recurrent major depressive disorder, in partial remission (HCC) 2017   • Unspecified asthma(493.90)     Exercise-induced        Past Surgical History:   Procedure Laterality Date   • GYN SURGERY      exploratory lap   • OTHER ORTHOPEDIC SURGERY Right     frx arm   • WIDE EXCISION MELANOMA, LEG, W/POSS.STSG         Meds: Prenatal vitamins    Allergies: Paxil [paroxetine] and Sulfa drugs    Physical Exam:    /66   Wt 59.9 kg (132 lb)   LMP 09/10/2020   BMI 21.97 kg/m²   Gen: well-appearing, well-hydrated, well-nourished  HEENT: normal;   PERRLA, EOMI, sclera clear  Lungs: Clear to auscultation  Heart: RRR No M  Abd: abdomen is soft without significant tenderness, masses, organomegaly or guarding  Pelvic: External genitalia normal  Ext: NT bilaterally, no cyanosis, clubbing or edema    No results found for this or any previous visit (from the past 336 hour(s)).    Ultrasound:     Transvaginal US performed and per my  read:    Indication: Dating    Findings: saba intrauterine pregnancy @10 weeks by CRL.   Positive gestational sac.  Positive yolk sac.   Positive fetal cardiac activity @180 BPM.   Right ovary normal. Left Ovary normal. Cervical length 3.8 cm.   No free fluid in the cul-de-sac.    Impression: viable IUP @10 weeks. EDC by US of 2021      Assessment:  25 y.o.   amenorrhea  Pregnancy exam/test positive    Plan:  4 weeks for new OB appt  Pap smear due in   Normal pregnancy symptoms discussed  SAB/labor precautions educated  Order prenatal labs and any additional imaging needed at new OB visit  Drink at least 2 liters of water daily  Exercise 30 minutes daily  Call MD del toro/ questions or concerns    Discussed with patient asthma exacerbation in pregnancy.  She will keep an eye out and let us know if there is any worsening issues    Follow-up in 4 weeks for new OB visit    Final due date 2021 consistent with today's ultrasound

## 2020-11-25 ENCOUNTER — TELEPHONE (OUTPATIENT)
Dept: OBGYN | Facility: CLINIC | Age: 25
End: 2020-11-25

## 2020-11-25 NOTE — TELEPHONE ENCOUNTER
11/25/2020 10:29am Patient Left voice mail returned call. Patient stating symptoms at 13 weeks. Having cramps and brown discharge since 11/24/2020 wants to know if this is normal.     10:45am Called patient to ask her more questions regarding her symptoms. Left message to call me back.

## 2020-12-04 ENCOUNTER — HOSPITAL ENCOUNTER (OUTPATIENT)
Facility: MEDICAL CENTER | Age: 25
End: 2020-12-04
Attending: NURSE PRACTITIONER
Payer: COMMERCIAL

## 2020-12-04 ENCOUNTER — INITIAL PRENATAL (OUTPATIENT)
Dept: OBGYN | Facility: CLINIC | Age: 25
End: 2020-12-04
Payer: COMMERCIAL

## 2020-12-04 ENCOUNTER — HOSPITAL ENCOUNTER (OUTPATIENT)
Dept: LAB | Facility: MEDICAL CENTER | Age: 25
End: 2020-12-04
Attending: NURSE PRACTITIONER
Payer: COMMERCIAL

## 2020-12-04 VITALS — BODY MASS INDEX: 23.13 KG/M2 | SYSTOLIC BLOOD PRESSURE: 99 MMHG | WEIGHT: 139 LBS | DIASTOLIC BLOOD PRESSURE: 50 MMHG

## 2020-12-04 DIAGNOSIS — Z34.02 SUPERVISION OF NORMAL FIRST PREGNANCY IN SECOND TRIMESTER: ICD-10-CM

## 2020-12-04 DIAGNOSIS — Z34.02 SUPERVISION OF NORMAL FIRST PREGNANCY IN SECOND TRIMESTER: Primary | ICD-10-CM

## 2020-12-04 LAB
ABO GROUP BLD: NORMAL
BASOPHILS # BLD AUTO: 0.6 % (ref 0–1.8)
BASOPHILS # BLD: 0.06 K/UL (ref 0–0.12)
BLD GP AB SCN SERPL QL: NORMAL
EOSINOPHIL # BLD AUTO: 0.18 K/UL (ref 0–0.51)
EOSINOPHIL NFR BLD: 1.8 % (ref 0–6.9)
ERYTHROCYTE [DISTWIDTH] IN BLOOD BY AUTOMATED COUNT: 42.8 FL (ref 35.9–50)
HBV SURFACE AG SER QL: ABNORMAL
HCT VFR BLD AUTO: 42.7 % (ref 37–47)
HCV AB SER QL: ABNORMAL
HCV AB SER QL: NORMAL
HGB BLD-MCNC: 14 G/DL (ref 12–16)
HIV 1+2 AB+HIV1 P24 AG SERPL QL IA: NORMAL
IMM GRANULOCYTES # BLD AUTO: 0.05 K/UL (ref 0–0.11)
IMM GRANULOCYTES NFR BLD AUTO: 0.5 % (ref 0–0.9)
LYMPHOCYTES # BLD AUTO: 1.68 K/UL (ref 1–4.8)
LYMPHOCYTES NFR BLD: 16.9 % (ref 22–41)
MCH RBC QN AUTO: 29.6 PG (ref 27–33)
MCHC RBC AUTO-ENTMCNC: 32.8 G/DL (ref 33.6–35)
MCV RBC AUTO: 90.3 FL (ref 81.4–97.8)
MONOCYTES # BLD AUTO: 0.58 K/UL (ref 0–0.85)
MONOCYTES NFR BLD AUTO: 5.8 % (ref 0–13.4)
NEUTROPHILS # BLD AUTO: 7.39 K/UL (ref 2–7.15)
NEUTROPHILS NFR BLD: 74.4 % (ref 44–72)
NRBC # BLD AUTO: 0 K/UL
NRBC BLD-RTO: 0 /100 WBC
PLATELET # BLD AUTO: 313 K/UL (ref 164–446)
PMV BLD AUTO: 10.9 FL (ref 9–12.9)
RBC # BLD AUTO: 4.73 M/UL (ref 4.2–5.4)
RH BLD: NORMAL
RUBV AB SER QL: 5.18 IU/ML
TREPONEMA PALLIDUM IGG+IGM AB [PRESENCE] IN SERUM OR PLASMA BY IMMUNOASSAY: ABNORMAL
WBC # BLD AUTO: 9.9 K/UL (ref 4.8–10.8)

## 2020-12-04 PROCEDURE — 85025 COMPLETE CBC W/AUTO DIFF WBC: CPT

## 2020-12-04 PROCEDURE — 86780 TREPONEMA PALLIDUM: CPT

## 2020-12-04 PROCEDURE — 86762 RUBELLA ANTIBODY: CPT

## 2020-12-04 PROCEDURE — 86901 BLOOD TYPING SEROLOGIC RH(D): CPT

## 2020-12-04 PROCEDURE — 87340 HEPATITIS B SURFACE AG IA: CPT

## 2020-12-04 PROCEDURE — 36415 COLL VENOUS BLD VENIPUNCTURE: CPT

## 2020-12-04 PROCEDURE — 87389 HIV-1 AG W/HIV-1&-2 AB AG IA: CPT

## 2020-12-04 PROCEDURE — 86900 BLOOD TYPING SEROLOGIC ABO: CPT

## 2020-12-04 PROCEDURE — 87591 N.GONORRHOEAE DNA AMP PROB: CPT

## 2020-12-04 PROCEDURE — 81003 URINALYSIS AUTO W/O SCOPE: CPT

## 2020-12-04 PROCEDURE — 86803 HEPATITIS C AB TEST: CPT

## 2020-12-04 PROCEDURE — 59401 PR NEW OB VISIT: CPT | Performed by: NURSE PRACTITIONER

## 2020-12-04 PROCEDURE — 80307 DRUG TEST PRSMV CHEM ANLYZR: CPT

## 2020-12-04 PROCEDURE — 86850 RBC ANTIBODY SCREEN: CPT

## 2020-12-04 PROCEDURE — 87491 CHLMYD TRACH DNA AMP PROBE: CPT

## 2020-12-04 ASSESSMENT — EDINBURGH POSTNATAL DEPRESSION SCALE (EPDS)
THE THOUGHT OF HARMING MYSELF HAS OCCURRED TO ME: NEVER
THINGS HAVE BEEN GETTING ON TOP OF ME: NO, I HAVE BEEN COPING AS WELL AS EVER
I HAVE BEEN ANXIOUS OR WORRIED FOR NO GOOD REASON: NO, NOT AT ALL
I HAVE BLAMED MYSELF UNNECESSARILY WHEN THINGS WENT WRONG: YES, MOST OF THE TIME
I HAVE FELT SCARED OR PANICKY FOR NO GOOD REASON: NO, NOT AT ALL
I HAVE BEEN SO UNHAPPY THAT I HAVE HAD DIFFICULTY SLEEPING: NOT VERY OFTEN
I HAVE LOOKED FORWARD WITH ENJOYMENT TO THINGS: AS MUCH AS I EVER DID
I HAVE BEEN ABLE TO LAUGH AND SEE THE FUNNY SIDE OF THINGS: AS MUCH AS I ALWAYS COULD
I HAVE BEEN SO UNHAPPY THAT I HAVE BEEN CRYING: NO, NEVER
TOTAL SCORE: 4
I HAVE FELT SAD OR MISERABLE: NO, NOT AT ALL

## 2020-12-04 ASSESSMENT — ENCOUNTER SYMPTOMS
CARDIOVASCULAR NEGATIVE: 1
MUSCULOSKELETAL NEGATIVE: 1
PSYCHIATRIC NEGATIVE: 1
CONSTITUTIONAL NEGATIVE: 1
NEUROLOGICAL NEGATIVE: 1
EYES NEGATIVE: 1
GASTROINTESTINAL NEGATIVE: 1
RESPIRATORY NEGATIVE: 1

## 2020-12-04 NOTE — LETTER
December 4, 2020         Patient: Karol Baca   YOB: 1995   Date of Visit: 12/4/2020           To Whom it May Concern:    Karol Baca was seen in my clinic on 12/4/2020 for her prenatal exam.      If you have any questions or concerns, please don't hesitate to call.        Sincerely,           IRWIN Irene  Electronically Signed

## 2020-12-04 NOTE — PROGRESS NOTES
S:  Karol Baca is a 25 y.o.  who presents for her new OB exam.  She is 14w0d with and RYANNE of Estimated Date of Delivery: 21 based off of US . She has no complaints.  She is currently working at [employer], denies heavy lifting or chemical exposure.They are being good about not making her lift heavy things and have transferred over to [employer] but have told her that they did not receive her FMLA paperwork.  Shown to pt that it went through with confirmation that is was completed via fax.  Pt desires it to be filled out again and sent.  Denies ER visits or previous care in this pregnancy.     Expresses concern regarding presence of her hymen.  States at times intercourse is painful as well as sometimes she cannot use tampons due to it.      Desires NIPT.  Declines CF.  Denies VB, LOF, or cramping.  Denies dysuria, vaginal DC. Reports no fetal movement.     Pt is single and lives with parents and boyffriend.  Pregnancy is planned.      Discussed diet and exercise during pregnancy. Encouraged good nutrition, and daily exercise including walking or swimming. Discussed expected weight gain during pregnancy. Discussed adequate hydration during pregnancy.  Review of Systems   Constitutional: Negative.    HENT: Negative.    Eyes: Negative.    Respiratory: Negative.    Cardiovascular: Negative.    Gastrointestinal: Negative.    Genitourinary: Negative.    Musculoskeletal: Negative.    Skin: Negative.    Neurological: Negative.    Endo/Heme/Allergies: Negative.    Psychiatric/Behavioral: Negative.    All other systems reviewed and are negative.      Past Medical History:   Diagnosis Date   • Anxiety    • Endometriosis    • Left wrist pain 2020   • Migraine    • Recurrent major depressive disorder, in partial remission ([facility]) 2017   • Unspecified asthma(493.90)     Exercise-induced      Family History   Problem Relation Age of Onset   • Stroke Maternal Grandmother    • Other Maternal Grandmother          PCOS   • Psychiatric Illness Maternal Grandmother    • Cancer Maternal Grandfather         Prostate   • Heart Disease Paternal Grandmother    • Psychiatric Illness Mother    • Heart Disease Father    • No Known Problems Brother    • No Known Problems Brother    • No Known Problems Brother      Social History     Socioeconomic History   • Marital status: Single     Spouse name: Not on file   • Number of children: Not on file   • Years of education: Not on file   • Highest education level: 12th grade   Occupational History     Comment: [employer]   Social Needs   • Financial resource strain: Somewhat hard   • Food insecurity     Worry: Never true     Inability: Never true   • Transportation needs     Medical: No     Non-medical: No   Tobacco Use   • Smoking status: Former Smoker     Packs/day: 0.75     Years: 7.00     Pack years: 5.25     Types: Cigarettes     Quit date: 2020     Years since quittin.4   • Smokeless tobacco: Never Used   Substance and Sexual Activity   • Alcohol use: Not Currently     Frequency: Never     Drinks per session: Patient refused     Binge frequency: Never   • Drug use: Not Currently     Comment: marijuana   • Sexual activity: Yes     Partners: Male     Comment: Monogomous relationship   Lifestyle   • Physical activity     Days per week: 4 days     Minutes per session: 70 min   • Stress: Not at all   Relationships   • Social connections     Talks on phone: More than three times a week     Gets together: Once a week     Attends Shinto service: Never     Active member of club or organization: No     Attends meetings of clubs or organizations: Never     Relationship status: Never    • Intimate partner violence     Fear of current or ex partner: Not on file     Emotionally abused: Not on file     Physically abused: Not on file     Forced sexual activity: Not on file   Other Topics Concern   • Not on file   Social History Narrative   • Not on file     OB  History    Para Term  AB Living   2       1     SAB TAB Ectopic Molar Multiple Live Births   1                # Outcome Date GA Lbr Obdulio/2nd Weight Sex Delivery Anes PTL Lv   2 Current            1 SAB                History of Varicella Virus: had as child  History of HSV I or II in self or partner: denies  History of Thyroid problems: denies    O:  BP (!) 99/50   Wt 63 kg (139 lb)    See Prenatal Physical.    Wet mount: deferred  Physical Exam   Constitutional: She is oriented to person, place, and time and well-developed, well-nourished, and in no distress.   HENT:   Head: Normocephalic and atraumatic.   Right Ear: External ear normal.   Left Ear: External ear normal.   Eyes: Pupils are equal, round, and reactive to light. Conjunctivae and EOM are normal.   Neck: Normal range of motion. Neck supple. No thyromegaly present.   Cardiovascular: Normal rate, regular rhythm, normal heart sounds and intact distal pulses.   Pulmonary/Chest: Effort normal and breath sounds normal.   Abdominal: Soft. Bowel sounds are normal. She exhibits no distension. There is no abdominal tenderness.   Hip piercings bilaterally   Musculoskeletal: Normal range of motion.   Neurological: She is alert and oriented to person, place, and time. Gait normal.   Skin: Skin is warm and dry.   Many tattos covering body   Psychiatric: Mood, memory, affect and judgment normal.   Nursing note and vitals reviewed.        A:   1.  IUP @ 14w0d per US        2.  S=D        3.  See problem list below        4.  Supervision of normal first pregnancy       5. Pap due in 2021     Patient Active Problem List    Diagnosis Date Noted   • Asthma affecting pregnancy in first trimester 2020   • Amenorrhea 2020   • Endometriosis 2017   • Mild intermittent asthma without complication 2017   • Migraine without aura 2017   • Recurrent major depressive disorder, in full remission (HCC) 2017         P:  1.  GC/CT          2.  Prenatal labs ordered - lab slip given        3.  Discussed PNV, diet, avoidances and adequate water intake        4.  NOB packet given        5.  Return to office in 4 wks        6.  Complete OB US in 4 wks        7.  Desires NIPT testing, instructions given        8. Reassurance given regarding hymenal ring still allowing attempt at vaginal birth       9. Declines medication or counseling for mental health at this time       10. Will give FMLA to CMA     Orders Placed This Encounter   • THINPREP RFLX HPV ASCUS W/CTNG   • PRENATAL PANEL 3+HIV+HCV   • URINALYSIS,CULTURE IF INDICATED   • URINE DRUG SCREEN W/CONF (AR)   • HEP C VIRUS ANTIBODY   • Prenatal Vit-Fe Fumarate-FA (PRENATAL 1+1 PO)

## 2020-12-04 NOTE — NON-PROVIDER
NOB today  LMP: 09/10/2020  Last pap:07/25/2018, + HR HPV  Phone # [phone]  Pharmacy confirmed  On PNV  Cystic Fibrosis test offered.  C/o occasional n/v, had some leaking of fluid of brown fluid 2 weeks ago but it went away, none since then.

## 2020-12-05 DIAGNOSIS — Z34.02 SUPERVISION OF NORMAL FIRST PREGNANCY IN SECOND TRIMESTER: ICD-10-CM

## 2020-12-05 LAB
APPEARANCE UR: CLEAR
BILIRUB UR QL STRIP.AUTO: NEGATIVE
C TRACH DNA SPEC QL NAA+PROBE: NEGATIVE
COLOR UR: YELLOW
GLUCOSE UR STRIP.AUTO-MCNC: NEGATIVE MG/DL
KETONES UR STRIP.AUTO-MCNC: NEGATIVE MG/DL
LEUKOCYTE ESTERASE UR QL STRIP.AUTO: NEGATIVE
MICRO URNS: NORMAL
N GONORRHOEA DNA SPEC QL NAA+PROBE: NEGATIVE
NITRITE UR QL STRIP.AUTO: NEGATIVE
PH UR STRIP.AUTO: 6 [PH] (ref 5–8)
PROT UR QL STRIP: NEGATIVE MG/DL
RBC UR QL AUTO: NEGATIVE
SP GR UR STRIP.AUTO: 1.02
SPECIMEN SOURCE: NORMAL
UROBILINOGEN UR STRIP.AUTO-MCNC: 0.2 MG/DL

## 2020-12-06 LAB
AMPHET CTO UR CFM-MCNC: NEGATIVE NG/ML
BARBITURATES CTO UR CFM-MCNC: NEGATIVE NG/ML
BENZODIAZ CTO UR CFM-MCNC: NEGATIVE NG/ML
CANNABINOIDS CTO UR CFM-MCNC: NEGATIVE NG/ML
COCAINE CTO UR CFM-MCNC: NEGATIVE NG/ML
DRUG COMMENT 753798: NORMAL
METHADONE CTO UR CFM-MCNC: NEGATIVE NG/ML
OPIATES CTO UR CFM-MCNC: NEGATIVE NG/ML
PCP CTO UR CFM-MCNC: NEGATIVE NG/ML
PROPOXYPH CTO UR CFM-MCNC: NEGATIVE NG/ML

## 2020-12-07 PROBLEM — Z28.39 RUBELLA NON-IMMUNE STATUS, ANTEPARTUM: Status: ACTIVE | Noted: 2020-12-07

## 2020-12-07 PROBLEM — O09.899 RUBELLA NON-IMMUNE STATUS, ANTEPARTUM: Status: ACTIVE | Noted: 2020-12-07

## 2020-12-10 ENCOUNTER — TELEPHONE (OUTPATIENT)
Dept: OBGYN | Facility: CLINIC | Age: 25
End: 2020-12-10

## 2020-12-10 NOTE — TELEPHONE ENCOUNTER
Received a fax from [facility] stating they are unable to process the case due to missing information. Called [facility], spoke with Debbie and provided missing information (physician NPI and insurance information)

## 2021-01-11 ENCOUNTER — TELEPHONE (OUTPATIENT)
Dept: OBGYN | Facility: CLINIC | Age: 26
End: 2021-01-11

## 2021-01-11 ENCOUNTER — ROUTINE PRENATAL (OUTPATIENT)
Dept: OBGYN | Facility: CLINIC | Age: 26
End: 2021-01-11
Payer: COMMERCIAL

## 2021-01-11 VITALS — WEIGHT: 143 LBS | DIASTOLIC BLOOD PRESSURE: 56 MMHG | SYSTOLIC BLOOD PRESSURE: 117 MMHG | BODY MASS INDEX: 23.8 KG/M2

## 2021-01-11 DIAGNOSIS — O99.511 ASTHMA AFFECTING PREGNANCY IN FIRST TRIMESTER: ICD-10-CM

## 2021-01-11 DIAGNOSIS — R05.9 COUGH: ICD-10-CM

## 2021-01-11 DIAGNOSIS — J02.9 SORE THROAT: ICD-10-CM

## 2021-01-11 DIAGNOSIS — J06.9 ACUTE URI: ICD-10-CM

## 2021-01-11 DIAGNOSIS — J45.909 ASTHMA AFFECTING PREGNANCY IN FIRST TRIMESTER: ICD-10-CM

## 2021-01-11 DIAGNOSIS — Z3A.19 19 WEEKS GESTATION OF PREGNANCY: ICD-10-CM

## 2021-01-11 DIAGNOSIS — J45.20 MILD INTERMITTENT ASTHMA WITHOUT COMPLICATION: ICD-10-CM

## 2021-01-11 PROCEDURE — 90040 PR PRENATAL FOLLOW UP: CPT | Performed by: OBSTETRICS & GYNECOLOGY

## 2021-01-11 RX ORDER — ALBUTEROL SULFATE 90 UG/1
2 AEROSOL, METERED RESPIRATORY (INHALATION) EVERY 4 HOURS PRN
Qty: 1 EACH | Refills: 12 | Status: SHIPPED | OUTPATIENT
Start: 2021-01-11 | End: 2021-05-03

## 2021-01-11 NOTE — PROGRESS NOTES
Pt here today for OB follow up  Pt states no VB or LOF   Reports +FM  Good # [phone]   Pharmacy Confirmed.

## 2021-01-11 NOTE — PROGRESS NOTES
Chief complaint: Return visit    S: Pt presents for routine OB follow up.  No contractions, vaginal bleeding, or leaking fluids. Good fetal movement.    Patient needs a refill on her asthma inhaler.  No other questions    Questions answered.    O: /56   Wt 64.9 kg (143 lb)   LMP 09/10/2020   BMI 23.80 kg/m²   Patients' weight gain, fluid intake and exercise level discussed.  Vitals, fundal height , fetal position, and FHR reviewed on flowsheet    Lab:No results found for this or any previous visit (from the past 336 hour(s)).    A/P:  25 y.o.  at 19w3d presents for routine obstetric follow-up.  Size equals dates and/or scan    1.  Continue prenatal vitamins.  2.  Begin kick counts at 28 weeks.  3.  Exercise at least 30 minutes daily.  4.  Drink at least 2 Liters of water daily  5.  Follow-up in 4 weeks.    Has an anatomical ultrasound scheduled for next week.    All questions answered

## 2021-01-19 ENCOUNTER — HOSPITAL ENCOUNTER (OUTPATIENT)
Dept: RADIOLOGY | Facility: MEDICAL CENTER | Age: 26
End: 2021-01-19
Attending: NURSE PRACTITIONER
Payer: COMMERCIAL

## 2021-01-19 DIAGNOSIS — Z34.02 SUPERVISION OF NORMAL FIRST PREGNANCY IN SECOND TRIMESTER: ICD-10-CM

## 2021-01-19 PROCEDURE — 76817 TRANSVAGINAL US OBSTETRIC: CPT

## 2021-01-21 DIAGNOSIS — O34.32 CERVICAL FUNNELING AFFECTING PREGNANCY IN SECOND TRIMESTER: ICD-10-CM

## 2021-01-21 NOTE — PROGRESS NOTES
Verified pt name and .    Explained low lying placenta and cervical funneling.  Recommended pelvic rest until further notice.  Referral to [facility].

## 2021-01-22 ENCOUNTER — PATIENT MESSAGE (OUTPATIENT)
Dept: OBGYN | Facility: CLINIC | Age: 26
End: 2021-01-22

## 2021-01-22 NOTE — TELEPHONE ENCOUNTER
----- Message from Karol Baca sent at 1/22/2021  9:12 AM PST -----  Regarding: Test Result Question  Contact: [phone]  Cindy,   I contacted Akua puente yesterday on behave of some results on my 20 week ultrasound. I was hoping you could look them over for yourself. She told me she was gonna recommend me to high risk pregnancy center to expect their call. I haven't heard from them so I called myself and they told me they never got a referral yet. To try to call later in the day. I'm concerned because my cervix is 3.2 cm and I have funneling. I'm not in pain nor am I bleeding but I do feel pressure and I have a lot of vaginal discharge. My boyfriend said when they were doing the exam that it looked like a V shape. Akua said she would do the referral for another ultrasound and second opinion. I feel timing is everything right now especially if the funneling has potential of worsening. I'd like to see someone or hear from them very soon. Will you please look into this yourself and possibly make a referral that way I know my baby and I are being helped.   Sincerely, Karol Baca      Called pt and explained Akua Marcelo VALERIO did placed referral to [facility] yesterday, but it goes to the referral dept and it usually takes they about 1-2 days to send referral to the appropriate office because they have to check her insurance and process everything. She should expect a call from [facility] by Tuesday of next week but if she does not receive it she can try to call them at the end of the day. Advised pt to follow Akua's advise to be on pelvic rest, also not to lift anything > 10lb, no intercourse or orgasm and stay hydrated. Pt asking if standing will affect because she work about 10-12hr standing? Consulted with Melly Jang CNM who stated standing will not affect as much but if possible to take breaks every 2hrs and put legs up. Pt notified and requested a letter to be send for her work. Pt verbalized understanding of all  instructions give. Melly Jang agreed to send letter. Letter will be send via e-mail per pt request.

## 2021-01-22 NOTE — LETTER
January 22, 2021              Karol Baca is currently being cared for at [facility].  It is our recommendation for Karol to take 15min break every 2 hours to allow her to have a snack and drink.            Thank you,    Melly Jang CNM    Electronically Signed

## 2021-01-22 NOTE — LETTER
January 22, 2021              Karol Baca is currently being cared for at [facility]. Please allow Karol to take 15 min breaks every 2 hours due to her medical condition.          Thank you,          Electronically Signed

## 2021-01-26 ENCOUNTER — TELEPHONE (OUTPATIENT)
Dept: OBGYN | Facility: CLINIC | Age: 26
End: 2021-01-26

## 2021-01-26 NOTE — TELEPHONE ENCOUNTER
Pt called stating d/t the recommendation we gave for breaks every 2hrs fro drink, snack and to elevate legs her work is asking for the providers name and our office fax number for them to fax some paper work for our office to fill out. Explained to pt that as we discuss on our previous call this is just a recommendation and is not medical necessary and it might not be approve. Pt verbalized understanding. Provider's name Melly Jang CNM given as well as office fax number [phone]. Maia Pickard's MA notified via phone to watch for paper work .

## 2021-02-08 ENCOUNTER — ROUTINE PRENATAL (OUTPATIENT)
Dept: OBGYN | Facility: CLINIC | Age: 26
End: 2021-02-08
Payer: COMMERCIAL

## 2021-02-08 VITALS — SYSTOLIC BLOOD PRESSURE: 137 MMHG | WEIGHT: 152 LBS | BODY MASS INDEX: 25.29 KG/M2 | DIASTOLIC BLOOD PRESSURE: 76 MMHG

## 2021-02-08 DIAGNOSIS — O26.872 SHORT CERVIX DURING PREGNANCY IN SECOND TRIMESTER: ICD-10-CM

## 2021-02-08 DIAGNOSIS — J45.909 ASTHMA AFFECTING PREGNANCY IN FIRST TRIMESTER: ICD-10-CM

## 2021-02-08 DIAGNOSIS — O99.511 ASTHMA AFFECTING PREGNANCY IN FIRST TRIMESTER: ICD-10-CM

## 2021-02-08 DIAGNOSIS — Z3A.23 23 WEEKS GESTATION OF PREGNANCY: ICD-10-CM

## 2021-02-08 DIAGNOSIS — J45.20 MILD INTERMITTENT ASTHMA WITHOUT COMPLICATION: ICD-10-CM

## 2021-02-08 PROCEDURE — 90040 PR PRENATAL FOLLOW UP: CPT | Performed by: OBSTETRICS & GYNECOLOGY

## 2021-02-08 NOTE — PROGRESS NOTES
Chief complaint: Return visit    S: Pt presents for routine OB follow up.  No contractions, vaginal bleeding, or leaking fluids. Good fetal movement.    Pregnancy has been complicated by short cervix.  Has been seen in the high risk pregnancy center for monitoring.  Last ultrasound shows cervical length at 21 mm.  Patient was started on vaginal progesterone at that time.  Has follow-up visit today.    Questions answered.    O: /76   Wt 68.9 kg (152 lb)   LMP 09/10/2020   BMI 25.29 kg/m²   Patients' weight gain, fluid intake and exercise level discussed.  Vitals, fundal height , fetal position, and FHR reviewed on flowsheet    Lab:No results found for this or any previous visit (from the past 336 hour(s)).    A/P:  25 y.o.  at 23w3d presents for routine obstetric follow-up.  Size equals dates and/or scan    1.  Continue prenatal vitamins.  2.  Begin kick counts at 28 weeks.  3.  Exercise at least 30 minutes daily.  4.  Drink at least 2 Liters of water daily  5.  Follow-up in 4 weeks.    Patient will see high risk pregnancy center today.  If any further shortening, may be a candidate for cerclage.  Otherwise, will continue on vaginal progesterone.  Precautions discussed with patient.    All questions answered

## 2021-02-08 NOTE — PROGRESS NOTES
Pt here today for OB follow up  Pt states no VB or LOF   Reports +FM  Good # [phone]    Pharmacy Confirmed.

## 2021-03-02 ENCOUNTER — HOSPITAL ENCOUNTER (OUTPATIENT)
Dept: LAB | Facility: MEDICAL CENTER | Age: 26
End: 2021-03-02
Attending: OBSTETRICS & GYNECOLOGY
Payer: COMMERCIAL

## 2021-03-02 DIAGNOSIS — Z34.02 SUPERVISION OF NORMAL FIRST PREGNANCY IN SECOND TRIMESTER: ICD-10-CM

## 2021-03-02 DIAGNOSIS — Z3A.23 23 WEEKS GESTATION OF PREGNANCY: ICD-10-CM

## 2021-03-02 LAB
ERYTHROCYTE [DISTWIDTH] IN BLOOD BY AUTOMATED COUNT: 42.8 FL (ref 35.9–50)
GLUCOSE 1H P 50 G GLC PO SERPL-MCNC: 81 MG/DL (ref 70–139)
HCT VFR BLD AUTO: 40.2 % (ref 37–47)
HGB BLD-MCNC: 13.2 G/DL (ref 12–16)
MCH RBC QN AUTO: 31.1 PG (ref 27–33)
MCHC RBC AUTO-ENTMCNC: 32.8 G/DL (ref 33.6–35)
MCV RBC AUTO: 94.6 FL (ref 81.4–97.8)
PLATELET # BLD AUTO: 272 K/UL (ref 164–446)
PMV BLD AUTO: 11.1 FL (ref 9–12.9)
RBC # BLD AUTO: 4.25 M/UL (ref 4.2–5.4)
TREPONEMA PALLIDUM IGG+IGM AB [PRESENCE] IN SERUM OR PLASMA BY IMMUNOASSAY: NORMAL
WBC # BLD AUTO: 9.7 K/UL (ref 4.8–10.8)

## 2021-03-02 PROCEDURE — 81420 FETAL CHRMOML ANEUPLOIDY: CPT

## 2021-03-02 PROCEDURE — 81422 FETAL CHRMOML MICRODELTJ: CPT

## 2021-03-02 PROCEDURE — 86780 TREPONEMA PALLIDUM: CPT

## 2021-03-02 PROCEDURE — 36415 COLL VENOUS BLD VENIPUNCTURE: CPT

## 2021-03-02 PROCEDURE — 82950 GLUCOSE TEST: CPT

## 2021-03-02 PROCEDURE — 85027 COMPLETE CBC AUTOMATED: CPT

## 2021-03-08 ENCOUNTER — ROUTINE PRENATAL (OUTPATIENT)
Dept: OBGYN | Facility: CLINIC | Age: 26
End: 2021-03-08
Payer: COMMERCIAL

## 2021-03-08 VITALS — BODY MASS INDEX: 26.54 KG/M2 | SYSTOLIC BLOOD PRESSURE: 113 MMHG | DIASTOLIC BLOOD PRESSURE: 63 MMHG | WEIGHT: 159.5 LBS

## 2021-03-08 DIAGNOSIS — Z34.92 SECOND TRIMESTER PREGNANCY: ICD-10-CM

## 2021-03-08 PROCEDURE — 90471 IMMUNIZATION ADMIN: CPT | Performed by: OBSTETRICS & GYNECOLOGY

## 2021-03-08 PROCEDURE — 90715 TDAP VACCINE 7 YRS/> IM: CPT | Performed by: OBSTETRICS & GYNECOLOGY

## 2021-03-08 PROCEDURE — 90040 PR PRENATAL FOLLOW UP: CPT | Performed by: OBSTETRICS & GYNECOLOGY

## 2021-03-08 NOTE — PROGRESS NOTES
S: Pt presents for routine OB follow up. Good fetal movement.  No contractions, vaginal bleeding, or leakage of fluid.    Questions answered.    O: /63   Wt 72.3 kg (159 lb 8 oz)   LMP 09/10/2020   BMI 26.54 kg/m²   Patients' weight gain, fluid intake and exercise level discussed.  Vitals, fundal height , fetal position, and FHR reviewed on flowsheet    Lab:  Recent Results (from the past 336 hour(s))   GLUCOSE 1HR GESTATIONAL    Collection Time: 21  1:56 PM   Result Value Ref Range    Glucose, Post Dose 81 70 - 139 mg/dL   CBC WITHOUT DIFFERENTIAL    Collection Time: 21  1:56 PM   Result Value Ref Range    WBC 9.7 4.8 - 10.8 K/uL    RBC 4.25 4.20 - 5.40 M/uL    Hemoglobin 13.2 12.0 - 16.0 g/dL    Hematocrit 40.2 37.0 - 47.0 %    MCV 94.6 81.4 - 97.8 fL    MCH 31.1 27.0 - 33.0 pg    MCHC 32.8 (L) 33.6 - 35.0 g/dL    RDW 42.8 35.9 - 50.0 fL    Platelet Count 272 164 - 446 K/uL    MPV 11.1 9.0 - 12.9 fL   T.PALLIDUM AB EIA    Collection Time: 21  1:56 PM   Result Value Ref Range    Syphilis, Treponemal Qual Non-Reactive Non-Reactive       A/P:  26 y.o.  at 27w3d presents for routine obstetric follow-up.  Size equals dates and/or scan    1.  Continue prenatal vitamins.  2.  Fetal kick counts.  3.  Exercise at least 30 minutes daily.  4.  Drink at least 2L of water daily  5.  Labor precautions educated.  6.  Follow-up in 2 weeks.  PNP WNL  NIPT- negative per [facility]  Anatomy scan, low lying placenta 1.3cm from cervix, cervical funneling 3.2cm long  Referral to [facility] 21 cervix 3.3cm long, using vaginal progesterone, low lying placenta resolved  1 hr GTT- WNL

## 2021-03-08 NOTE — LETTER
"Count Your Baby's Movements  Another step to a healthy delivery    Karol Baca            Dept: [phone]    How Many Weeks Pregnant:27w3d    Date to Begin Counting: 3/8/2021              How to use this chart    One way for your physician to keep track of your baby's health is by knowing how often the baby moves (or \"kicks\") in your womb.  You can help your physician to do this by using this chart every day.    Every day, you should see how many hours it takes for your baby to move 10 times.  Start in the morning, as soon as you get up.    · First, write down the time your baby moves until you get to 10.  · Check off one box every time your baby moves until you get to 10.  · Write down the time you finished counting in the last column.  · Total how long it took to count up all 10 movements.  · Finally, fill in the box that shows how long this took.  After counting 10 movements, you no longer have to count any more that day.  The next morning, just start counting again as soon as you get up.    What should you call a \"movement\"?  It is hard to say, because it will feel different from one mother to another and from one pregnancy to the next.  The important thing is that you count the movements the same way throughout your pregnancy.  If you have more questions, you should ask your physician.    Count carefully every day!  SAMPLE:  Week 28    How many hours did it take to feel 10 movements?       Start  Time     1     2     3     4     5     6     7     8     9     10   Finish Time   Mon 8:20 ·  ·  ·  ·  ·  ·  ·  ·  ·  ·  11:40   Tue Wed Thu Fri               Sat               Sun                 IMPORTANT: You should contact your physician if it takes more than two hours for you to feel 10 movements.  Each morning, write down the time and start to count the movements of your baby.  Keep track by checking off one box every time you feel one movement.  When you have felt " "10 \"kicks\", write down the time you finished counting in the last column.  Then fill in the   box (over the check deonte) for the number of hours it took.  Be sure to read the complete instructions on the previous page.            "

## 2021-03-08 NOTE — PROGRESS NOTES
Pt is here for OB Follow-up visit  Good Phone#:[phone]  Pharmacy verified.   Reports + Fetal movement.  Pt denies VB, LOF, and Uc's.  Pt states no other complaints for today.  Kick count sheet given and explained.  Pt desires Tdap.  Tdap vaccine given today. Left Deltoid. VIS given and screening check list reviewed with pt.  Tdap vaccine verified by Jessica Worrell MA.

## 2021-03-17 ENCOUNTER — HOSPITAL ENCOUNTER (EMERGENCY)
Facility: MEDICAL CENTER | Age: 26
End: 2021-03-17
Attending: OBSTETRICS & GYNECOLOGY | Admitting: OBSTETRICS & GYNECOLOGY
Payer: COMMERCIAL

## 2021-03-17 ENCOUNTER — TELEPHONE (OUTPATIENT)
Dept: OBGYN | Facility: CLINIC | Age: 26
End: 2021-03-17

## 2021-03-17 VITALS
OXYGEN SATURATION: 97 % | HEART RATE: 89 BPM | DIASTOLIC BLOOD PRESSURE: 70 MMHG | RESPIRATION RATE: 16 BRPM | TEMPERATURE: 98.1 F | SYSTOLIC BLOOD PRESSURE: 119 MMHG

## 2021-03-17 PROBLEM — O36.5990 IUGR (INTRAUTERINE GROWTH RESTRICTION) AFFECTING CARE OF MOTHER: Status: ACTIVE | Noted: 2021-03-17

## 2021-03-17 LAB
APPEARANCE UR: ABNORMAL
BACTERIA GENITAL QL WET PREP: NORMAL
COLOR UR AUTO: YELLOW
GLUCOSE UR QL STRIP.AUTO: NEGATIVE MG/DL
KETONES UR QL STRIP.AUTO: NEGATIVE MG/DL
LEUKOCYTE ESTERASE UR QL STRIP.AUTO: ABNORMAL
NITRITE UR QL STRIP.AUTO: NEGATIVE
PH UR STRIP.AUTO: 7 [PH] (ref 5–8)
PROT UR QL STRIP: NEGATIVE MG/DL
RBC UR QL AUTO: NEGATIVE
SIGNIFICANT IND 70042: NORMAL
SITE SITE: NORMAL
SOURCE SOURCE: NORMAL
SP GR UR STRIP.AUTO: 1.02 (ref 1–1.03)

## 2021-03-17 PROCEDURE — 59025 FETAL NON-STRESS TEST: CPT

## 2021-03-17 PROCEDURE — 99283 EMERGENCY DEPT VISIT LOW MDM: CPT | Performed by: NURSE PRACTITIONER

## 2021-03-17 PROCEDURE — 81002 URINALYSIS NONAUTO W/O SCOPE: CPT

## 2021-03-17 PROCEDURE — 99282 EMERGENCY DEPT VISIT SF MDM: CPT

## 2021-03-17 ASSESSMENT — ENCOUNTER SYMPTOMS
FLANK PAIN: 0
CONSTITUTIONAL NEGATIVE: 1
EYES NEGATIVE: 1
GASTROINTESTINAL NEGATIVE: 1
MUSCULOSKELETAL NEGATIVE: 1
NEUROLOGICAL NEGATIVE: 1
RESPIRATORY NEGATIVE: 1
PSYCHIATRIC NEGATIVE: 1
CARDIOVASCULAR NEGATIVE: 1
NERVOUS/ANXIOUS: 0

## 2021-03-17 NOTE — PROGRESS NOTES
"1444-Pt presents to L&D c/o increased discharge over the past 2-3 days and vaginal itching, describes discharge as \"creamy white\". Denies UC's, LOF or VB and confirms +FM. Currently sees [facility] as well for short cervical length and funneling. TOCO and EFM applied. VS taken. POC discussed  1505-Updated ABDIAZIZ Robertson CNM in department on pt arrival/complaint/status, provider to see pt at bedside  1535-RN and ABDIAZIZ Robertson CMN at bedside, SSE performed, white discharge noted. Vaginal swab collected   1640-Vaginal swab negative (see results). Phoned ABDIAZIZ Robertson CNM, updated on lab results, strip reviewed, orders to discharge pt home with instructions for OTC Monistat 7, pelvic rest and PTL precautions  1656-Pt discharged home, ambulatory and undelivered. Provided general instructions, PTL precautions and kick count instructions, understanding verbalized  "

## 2021-03-17 NOTE — DISCHARGE INSTRUCTIONS
General Instructions:  · If you think you are in labor, time contractions (lying on your left side) from the beginning of one contraction to the beginning of the next contraction for at least one hour.  · Increase fluid intake: you should consume 10-12 8 oz glasses of non-caffeinated fluid per day.  · Report any pressure or burning on urination to your physician.  · Monitor fetal movement: If you notice an absence or decrease in fetal movement, drink a large glass of water and rest on your side.  If there is no increase in movement, call your physician or go to the hospital for further evaluation.  · Report any sudden, sharp abdominal pain.  · Report any bleeding.  Spotting or pinkish discharge is normal after vaginal exam.  You may also spot after sexual intercourse.    Pre-term Labor (<37 weeks):  Call your physician or return to the hospital if:  · You have painless regular contractions more than 4 in one hour.  · Your water breaks (remember time and color).  · You have menstrual-like cramps, a low dull backache or pressure in your pelvis or back.  · Your baby does not move enough to complete the daily kick count (10 movements in 2 hours).  · Your baby moves much less often than on the days before or you have not felt your baby move all day.  · Please review the MEDICATION LIST section of your AFTER VISIT SUMMARY document.  · Take your medication as prescribed      Other Instructions:  Please carefully review your entire AFTER VISIT SUMMARY document for all discharge instructions.    Over the counter Monistat 7

## 2021-03-17 NOTE — TELEPHONE ENCOUNTER
Called pt pt thinks she has a yeast infection, and having cramping that makes her stop what she is doing and not move, and then cramping goes away. Pt also mentioned that she is having + FM, denies bleeding but having an increase in discharge, instructed pt that due to her medical history she should go to [facility] L&D for further evaluation to make sure she is not going into  labor and in regards to her itching notified pt that progesterone causes itching but she can buy monistat over the counter and put monistat on labial area per consult with Dr. Rey Valentine. Pt verbalized understanding.       Message from Karol Baca sent at 3/16/2021  7:07 PM PDT -----  Regarding: Non-Urgent Medical Question  Contact: [phone]  Hello,   I have been having discomfort and feeling itchy and believe I may have or am developing a yeast infection. I was wondering if there's anything I can do to help or fix it, just note I take Progesterone daily. I have an appointment with you this coming Monday and will be 29 weeks. I also am seeing the high risk center every week now cause my baby is measuring small. At 28 weeks he was 2 pounds 5 ounces. I'm not sure if you guys are in contact. But I can give more information of what was told to me to you Monday. But please let me know if there is anything I can do or if I'll just have to wait on the possible yeast infection.   Thank you, Karol Baca

## 2021-03-17 NOTE — ED PROVIDER NOTES
"Emergency Obstetric Consultation     Date of Service  3/17/2021    Reason for Consultation  Vaginal itching and discharge     History of Presenting Illness  26 y.o. female at 28w5d, based on 10 week US, who presented 3/17/2021 with vaginal itching, \"chunky white discharge\" and intermittent cramping that started 2 days ago.  She reports sometimes she'll have a cramp in the middle of the night, sometimes at work, but nothing that is regular or painful.  She does report the cramping she feels is in the area of her right round ligament and across the top of her fundus.  She denies any LOF but reports the amount of discharge is increased.  She denies vaginal bleeding or contractions and reports normal fetal movement.      She is being followed by [facility] for short cervix, low lying placenta, both of which has resolved per consult with them yesterday.  She continues to be administering vaginal progesterone nightly.      The fetus is FGR with a EFW 8%, normal dopplers, cervical length of 3.39cm and anterior placenta as of 3/15/21 at [facility].  She has started  testing at [facility] and is to return with them in 4 weeks for another growth US.      Review of Systems  Review of Systems   Constitutional: Negative.    HENT: Negative.    Eyes: Negative.    Respiratory: Negative.    Cardiovascular: Negative.    Gastrointestinal: Negative.    Genitourinary: Negative for dysuria, flank pain, frequency, hematuria and urgency.   Musculoskeletal: Negative.    Skin: Negative.    Neurological: Negative.    Endo/Heme/Allergies: Negative.    Psychiatric/Behavioral: Negative.  The patient is not nervous/anxious.    All other systems reviewed and are negative.      Obstetric History    OB History    Para Term  AB Living   2       1     SAB TAB Ectopic Molar Multiple Live Births   1                # Outcome Date GA Lbr Obdulio/2nd Weight Sex Delivery Anes PTL Lv   2 Current            1 SAB                Gynecologic " History  Patient's last menstrual period was 09/10/2020.  Last pap: 2018 negative, +HPV, deferred pap at   STI history: denies    Medical History   has a past medical history of Anxiety, Endometriosis, Left wrist pain (4/6/2020), Migraine, Recurrent major depressive disorder, in partial remission (HCC) (9/22/2017), and Unspecified asthma(493.90). She also has no past medical history of Addisons disease (HCC), Adrenal disorder (HCC), Allergy, Anemia, Arrhythmia, Arthritis, Blood transfusion without reported diagnosis, Cancer (HCC), Cataract, CHF (congestive heart failure) (HCC), Clotting disorder (HCC), COPD (chronic obstructive pulmonary disease) (HCC), Cushings syndrome (HCC), Diabetes (HCC), Diabetic neuropathy (HCC), GERD (gastroesophageal reflux disease), Glaucoma, Goiter, Head ache, Heart attack (HCC), Heart murmur, HIV (human immunodeficiency virus infection) (HCC), Hyperlipidemia, Hypertension, IBD (inflammatory bowel disease), Kidney disease, Meningitis, Muscle disorder, Osteoporosis, Parathyroid disorder (HCC), Pituitary disease (HCC), Pulmonary emphysema (HCC), Seizure (HCC), Sickle cell disease (HCC), Stroke (HCC), Substance abuse (HCC), Thyroid disease, Tuberculosis, or Urinary tract infection.    Surgical History   has a past surgical history that includes other orthopedic surgery (Right); gyn surgery; and wide excision melanoma, leg, w/poss.stsg.    Family History  family history includes Cancer in her maternal grandfather; Heart Disease in her father and paternal grandmother; No Known Problems in her brother, brother, and brother; Other in her maternal grandmother; Psychiatric Illness in her maternal grandmother and mother; Stroke in her maternal grandmother.    Social History   reports that she quit smoking about 8 months ago. Her smoking use included cigarettes. She has a 5.25 pack-year smoking history. She has never used smokeless tobacco. She reports previous alcohol use. She reports previous drug  use.    Medications  No current facility-administered medications for this encounter.   progesterone vaginal suppository nightly  PNV  Albuterol inhaler PRN    Allergies  Allergies   Allergen Reactions   • Paxil [Paroxetine] Unspecified     Gave patient UTI that led to hospitalization   • Sulfa Drugs Hives       Physical Exam  Vitals:    03/17/21 1448   BP: 119/70   Pulse: 89   Resp: 16   Temp: 36.7 °C (98.1 °F)   TempSrc: Temporal   SpO2: 97%       Physical Exam   Constitutional: She is oriented to person, place, and time and well-developed, well-nourished, and in no distress.   HENT:   Head: Normocephalic and atraumatic.   Right Ear: External ear normal.   Left Ear: External ear normal.   Eyes: Pupils are equal, round, and reactive to light. Conjunctivae and EOM are normal.   Cardiovascular: Normal rate and regular rhythm.   Pulmonary/Chest: Effort normal and breath sounds normal. No respiratory distress.   Abdominal: Soft. There is no abdominal tenderness.   Genitourinary:    Vaginal discharge present.      Genitourinary Comments: Thick whitish/yellow, external labia and vagina red, speculum exam very uncomfortable     Musculoskeletal:         General: No edema. Normal range of motion.      Cervical back: Normal range of motion.   Neurological: She is alert and oriented to person, place, and time. Gait normal.   Skin: Skin is warm and dry.   Psychiatric: Memory, affect and judgment normal.   anxious   Nursing note and vitals reviewed.      Laboratory    Negative wet prep: negative yeast, trich or BV           No results for input(s): NTPROBNP in the last 72 hours.         Imaging  1/19/21 cervical funneling and low lying placenta, normal anatomy   3/15/21 The fetus is FGR with a EFW 8%, normal dopplers, cervical length of 3.39cm and anterior placenta    Assessment  No new Assessment & Plan notes have been filed under this hospital service since the last note was generated.  Service: Obstetrics &  Gynecology    Karol Baca 26 y.o. year old  28w5d with vaginal itching and increased discharge  1. Hx of shortened cervix on vaginal progesterone  2. FGR 8% on 3/15  3. Negative wet prep but symptoms consistent with yeast or irritation from progesterone supps  4. Urine negative    Plan  1.Will try 7 day monistat OTC, to take in morning opposite of vaginal progesterone supps.  Discussed cutting back on sugar intake.    2. To continue f/u with [facility]  3. PTL  Precautions discussed.  Also discussed round ligament pain, emptying bladder frequently.      TONY Irene.

## 2021-03-18 LAB
22Q112 DEL SYND Q0669: NORMAL
5P15.2 DEL BLD/T FISH: NORMAL
ANNOTATION COMMENT IMP: NORMAL
AS GENE MUT ANL BLD/T: NORMAL
DEL 1P36 SYND Q0208: NORMAL
EER FATAL ANEU W MICROD Q0212: NORMAL
FET CHR X + Y ANEUP RISK PLAS.CFDNA QN: NORMAL
FET SEX US: NORMAL
FET TS 13 RISK PLAS.CFDNA QL: NORMAL
FET TS 18 RISK PLAS.CFDNA QL: NORMAL
FET TS 21 RISK PLAS.CFDNA QL: NORMAL
FETAL FRACTION Q0204: 20.4 %
GA (DAYS): 4 D
GA (WEEKS): 26 WEEKS
PWS GENE MUT ANL BLD/T: NORMAL
SERVICE CMNT-IMP: YES
TRIPLOIDY VAN TWIN Q0202: NORMAL

## 2021-03-22 ENCOUNTER — ROUTINE PRENATAL (OUTPATIENT)
Dept: OBGYN | Facility: CLINIC | Age: 26
End: 2021-03-22
Payer: COMMERCIAL

## 2021-03-22 VITALS — SYSTOLIC BLOOD PRESSURE: 123 MMHG | WEIGHT: 164 LBS | DIASTOLIC BLOOD PRESSURE: 84 MMHG | BODY MASS INDEX: 27.29 KG/M2

## 2021-03-22 DIAGNOSIS — O26.872 SHORT CERVIX DURING PREGNANCY IN SECOND TRIMESTER: ICD-10-CM

## 2021-03-22 DIAGNOSIS — O36.5930 POOR FETAL GROWTH AFFECTING MANAGEMENT OF MOTHER IN THIRD TRIMESTER, SINGLE OR UNSPECIFIED FETUS: ICD-10-CM

## 2021-03-22 PROCEDURE — 90040 PR PRENATAL FOLLOW UP: CPT | Performed by: OBSTETRICS & GYNECOLOGY

## 2021-03-22 NOTE — PROGRESS NOTES
Chief complaint: Return visit    S: Pt presents for routine OB follow up. Good fetal movement.  No contractions, vaginal bleeding, or leakage of fluid.    Questions answered.    Patient was seen in L&D secondary to vaginal itching.  Undergoing treatment with Monistat.    O: /84   Wt 74.4 kg (164 lb)   LMP 09/10/2020   BMI 27.29 kg/m²   Patients' weight gain, fluid intake and exercise level discussed.  Vitals, fundal height , fetal position, and FHR reviewed on flowsheet    Lab:  Recent Results (from the past 336 hour(s))   WET PREP    Collection Time: 21  3:35 PM    Specimen: Vaginal; Genital   Result Value Ref Range    Significant Indicator NEG     Source GEN     Site VAGINAL     Wet Prep For Parasites       No yeast.  No motile Trichomonas seen.  No clue cells seen.     POC UA    Collection Time: 21  3:37 PM   Result Value Ref Range    POC Color Yellow     POC Appearance Slightly Cloudy (A)     POC Glucose Negative Negative mg/dL    POC Ketones Negative Negative mg/dL    POC Specific Gravity 1.025 1.005 - 1.030    POC Blood Negative Negative    POC Urine PH 7.0 5.0 - 8.0    POC Protein Negative Negative mg/dL    POC Nitrites Negative Negative    POC Leukocyte Esterase Trace (A) Negative       A/P:  26 y.o.  at 29w3d presents for routine obstetric follow-up.  Size equals dates and/or scan    1.  Continue prenatal vitamins.  2.  Fetal kick counts.  3.  Exercise at least 30 minutes daily.  4.  Drink at least 2L of water daily  5.  Labor precautions educated.  6.  Follow-up in 1 weeks.  7.  GBS at 35 weeks    Patient has follow-up appointment with high risk pregnancy center next week.  Undergoing weekly Dopplers,  testing and regular growth scans.    Precautions discussed with patient

## 2021-03-22 NOTE — PROGRESS NOTES
Pt here today for OB follow up  Pt states no Vb or LOF   Reports +FM  Good # [phone]   Pharmacy Confirmed.

## 2021-03-23 ENCOUNTER — TELEPHONE (OUTPATIENT)
Dept: OBGYN | Facility: CLINIC | Age: 26
End: 2021-03-23

## 2021-03-23 NOTE — TELEPHONE ENCOUNTER
Pt notified.  ----- Message from Pino Emmanuel M.D. sent at 3/23/2021  8:26 AM PDT -----  NIPT with low risk for Down syndrome

## 2021-03-23 NOTE — TELEPHONE ENCOUNTER
Called the pt to offer appointment for 04/29/2021. Pt accepted, no further questions.  ----- Message from Karol Baca sent at 3/22/2021  3:11 PM PDT -----  Regarding: Non-Urgent Medical Question  Contact: [phone]  Hello,   I just noticed that I wasn't scheduled for a weekly appointment of April 24-30 and was wondering if an assistant or someone in charge of that area could give me a call at  to schedule an appointment.  If I don't hear from anyone in a few days I'll make sure to call and try to arrange an appointment.   Thank you :)   Karol Baca

## 2021-03-30 ENCOUNTER — ROUTINE PRENATAL (OUTPATIENT)
Dept: OBGYN | Facility: CLINIC | Age: 26
End: 2021-03-30
Payer: COMMERCIAL

## 2021-03-30 VITALS — WEIGHT: 165 LBS | DIASTOLIC BLOOD PRESSURE: 61 MMHG | SYSTOLIC BLOOD PRESSURE: 110 MMHG | BODY MASS INDEX: 27.46 KG/M2

## 2021-03-30 DIAGNOSIS — O36.5910 POOR FETAL GROWTH AFFECTING MANAGEMENT OF MOTHER IN FIRST TRIMESTER, SINGLE OR UNSPECIFIED FETUS: ICD-10-CM

## 2021-03-30 PROCEDURE — 90040 PR PRENATAL FOLLOW UP: CPT | Performed by: OBSTETRICS & GYNECOLOGY

## 2021-03-30 NOTE — PROGRESS NOTES
Pt here today for OB follow up  Pt states no VB or LOF   Reports +FM  Good # [phone]   Pharmacy Confirmed.  Chaperone offered and provided.

## 2021-03-30 NOTE — PROGRESS NOTES
OB Followup;    30w4d    Patient Active Problem List    Diagnosis Date Noted   • IUGR -8% on 3/15 2021   • Short cervix during pregnancy in second trimester-3.3cm on  at [facility] 2021   • Rubella non-immune status, antepartum 2020   • Asthma affecting pregnancy in second trimester 2020   • Amenorrhea 2020   • Endometriosis 2017   • Mild intermittent asthma without complication 2017   • Migraine without aura 2017   • Recurrent major depressive disorder, in full remission (HCC) 2017       Vitals:    21 0954   BP: 110/61   Weight: 74.8 kg (165 lb)       Patient presents for followup of OB care. Currently doing well . Good fetal movement no leakage of fluid no contractions or vaginal bleeding        Size equals dates, normal fetal heart rate      Labs; IUGR with last ultrasound showing fetal growth at 8th percentile per [facility]-patient has visit with [facility] on a weekly basis to perform AMOS and cord Dopplers as well as weekly NST  Patient has had her NST this week and is reactive at [facility].  Most likely looking at induction at 37 to 38 weeks as long as fetal surveillance looks good    Labor precautions given  Discussed proper weight gain during pregnancy.    Signs and symptoms of labor/ labor discussed   Discussed our group's policy on prenatal checkups and delivery  Discussed Covid precautions  Discussed Covid vaccination recommendations from CDC/ACOG  Discussed proper exercise during pregnancy  Discussed proper oral fluid hydration  Currently taking prenatal vitamins as instructed  Reviewed fetal kick counts and appropriate fetal movement during pregnancy  Reviewed postpartum birth control methods  All questions answered in detail    Followup in  1 weeks

## 2021-04-05 ENCOUNTER — ROUTINE PRENATAL (OUTPATIENT)
Dept: OBGYN | Facility: CLINIC | Age: 26
End: 2021-04-05
Payer: COMMERCIAL

## 2021-04-05 VITALS — WEIGHT: 167 LBS | DIASTOLIC BLOOD PRESSURE: 58 MMHG | SYSTOLIC BLOOD PRESSURE: 108 MMHG | BODY MASS INDEX: 27.79 KG/M2

## 2021-04-05 DIAGNOSIS — O36.5930 POOR FETAL GROWTH AFFECTING MANAGEMENT OF MOTHER IN THIRD TRIMESTER, SINGLE OR UNSPECIFIED FETUS: ICD-10-CM

## 2021-04-05 PROCEDURE — 90040 PR PRENATAL FOLLOW UP: CPT | Performed by: OBSTETRICS & GYNECOLOGY

## 2021-04-05 NOTE — PROGRESS NOTES
Chief complaint: Return visit    S: Pt presents for routine OB follow up. Good fetal movement.  No contractions, vaginal bleeding, or leakage of fluid.    Questions answered.    Patient being followed by high risk pregnancy center secondary to IUGR.  Undergoing twice weekly umbilical artery Dopplers and once weekly nonstress testing.  This will increase to twice weekly nonstress testing after 32 weeks gestation.  Last ultrasound shows 8th percentile growth.  Has follow-up growth ultrasound scheduled next week    Last S/D ratio at 2.8    O: /58   Wt 75.8 kg (167 lb)   LMP 09/10/2020   BMI 27.79 kg/m²   Patients' weight gain, fluid intake and exercise level discussed.  Vitals, fundal height , fetal position, and FHR reviewed on flowsheet    Lab:No results found for this or any previous visit (from the past 336 hour(s)).    A/P:  26 y.o.  at 31w3d presents for routine obstetric follow-up.  Size equals dates and/or scan    1.  Continue prenatal vitamins.  2.  Fetal kick counts.  3.  Exercise at least 30 minutes daily.  4.  Drink at least 2L of water daily  5.  Labor precautions educated.  6.  Follow-up in 1 weeks.  7.  GBS at 35 weeks    Precautions discussed with patient.  We will continue to follow growth as well as S/D ratio.  Continue weekly visits.    All questions answered

## 2021-04-05 NOTE — PROGRESS NOTES
Pt here today for OB follow up  Pt states no VB or LOF   Reports +FM   Good # [phone]   Pharmacy Confirmed.

## 2021-04-15 ENCOUNTER — ROUTINE PRENATAL (OUTPATIENT)
Dept: OBGYN | Facility: CLINIC | Age: 26
End: 2021-04-15
Payer: COMMERCIAL

## 2021-04-15 VITALS — WEIGHT: 168 LBS | BODY MASS INDEX: 27.96 KG/M2 | SYSTOLIC BLOOD PRESSURE: 115 MMHG | DIASTOLIC BLOOD PRESSURE: 69 MMHG

## 2021-04-15 DIAGNOSIS — O36.5930 POOR FETAL GROWTH AFFECTING MANAGEMENT OF MOTHER IN THIRD TRIMESTER, SINGLE OR UNSPECIFIED FETUS: ICD-10-CM

## 2021-04-15 PROCEDURE — 90040 PR PRENATAL FOLLOW UP: CPT | Performed by: NURSE PRACTITIONER

## 2021-04-15 NOTE — PROGRESS NOTES
SUBJECTIVE:  Pt is a 26 y.o.   at 32w6d  gestation. Presents today for follow-up prenatal care. Reports no issues at this time.  Reports good  fetal movement. Denies regular cramping/contractions, bleeding or leaking of fluid. Denies dysuria, headaches, N/V. Generally feels well today.      OBJECTIVE:  - See prenatal vitals flow  -   Vitals:    04/15/21 1558   BP: 115/69   Weight: 76.2 kg (168 lb)                 ASSESSMENT:   - IUP at 32w6d    - S=D   -   Patient Active Problem List    Diagnosis Date Noted   • IUGR -8% on 3/15 2021   • Short cervix during pregnancy in second trimester-3.3cm on  at [facility] 2021   • Rubella non-immune status, antepartum 2020   • Asthma affecting pregnancy in second trimester 2020   • Endometriosis 2017   • Migraine without aura 2017   • Recurrent major depressive disorder, in full remission (HCC) 2017         PLAN:  - S/sx pregnancy and labor warning signs vs general discomforts discussed  - Fetal movements and/or kick counts reviewed   - Adequate hydration reinforced  - Nutrition/exercise/vitamin education; continue PNV  - Was seen Monday and told growth in 13%; continueing NSTs at [facility] and repeat US in three weeks; pt is wondering about delivery timing and I told mindy this will depend on growth in the next few weeks- [facility] report requested   - S/p Tdap vacc   - S/p Flu vacc   - Anticipatory guidance given  - RTC in 2 weeks for follow-up prenatal care

## 2021-04-15 NOTE — PROGRESS NOTES
OB follow up   + fetal movement.  No VB, LOF or UC's.  [facility] 4/19 Growth US in 3 weeks  Pt states she has no concerns at this time  [phone] (home)   Preferred pharmacy confirmed.

## 2021-04-26 ENCOUNTER — ROUTINE PRENATAL (OUTPATIENT)
Dept: OBGYN | Facility: CLINIC | Age: 26
End: 2021-04-26
Payer: COMMERCIAL

## 2021-04-26 VITALS — WEIGHT: 172 LBS | SYSTOLIC BLOOD PRESSURE: 123 MMHG | BODY MASS INDEX: 28.62 KG/M2 | DIASTOLIC BLOOD PRESSURE: 58 MMHG

## 2021-04-26 DIAGNOSIS — O36.5930 POOR FETAL GROWTH AFFECTING MANAGEMENT OF MOTHER IN THIRD TRIMESTER, SINGLE OR UNSPECIFIED FETUS: ICD-10-CM

## 2021-04-26 PROCEDURE — 90040 PR PRENATAL FOLLOW UP: CPT | Performed by: OBSTETRICS & GYNECOLOGY

## 2021-04-26 NOTE — NON-PROVIDER
OB follow up   + fetal movement.  No VB, LOF or UC's.  Phone # [phone]  Preferred pharmacy confirmed.  [facility]: 05/03/2021  C/o back pain

## 2021-04-26 NOTE — PROGRESS NOTES
Chief complaint: Return OB visit    S: Pt presents for routine OB follow up. Good fetal movement.  No contractions, vaginal bleeding, or leakage of fluid.    Questions answered.    O: /58   Wt 78 kg (172 lb)   LMP 09/10/2020   BMI 28.62 kg/m²   Patients' weight gain, fluid intake and exercise level discussed.  Vitals, fundal height , fetal position, and FHR reviewed on flowsheet    Lab:No results found for this or any previous visit (from the past 336 hour(s)).    A/P:  26 y.o.  at 34w3d presents for routine obstetric follow-up.  Size equals dates and/or scan    1.  Continue prenatal vitamins.  2.  Fetal kick counts.  3.  Exercise at least 30 minutes daily.  4.  Drink at least 2L of water daily  5.  Labor precautions educated.  6.  Follow-up in 1 weeks.  7.  GBS At 36 weeks    Patient had follow-up with high risk pregnancy center, 13th percentile at last growth scan.  Has another growth scan scheduled for next week.  She continues to be on  testing.    Plan of care regarding delivery to be finalized after her next ultrasound.    All questions answered

## 2021-04-30 ENCOUNTER — HOSPITAL ENCOUNTER (EMERGENCY)
Facility: MEDICAL CENTER | Age: 26
End: 2021-04-30
Attending: OBSTETRICS & GYNECOLOGY | Admitting: OBSTETRICS & GYNECOLOGY
Payer: COMMERCIAL

## 2021-04-30 VITALS
SYSTOLIC BLOOD PRESSURE: 118 MMHG | HEART RATE: 60 BPM | OXYGEN SATURATION: 92 % | BODY MASS INDEX: 28.66 KG/M2 | TEMPERATURE: 98 F | RESPIRATION RATE: 16 BRPM | DIASTOLIC BLOOD PRESSURE: 65 MMHG | WEIGHT: 172 LBS | HEIGHT: 65 IN

## 2021-04-30 LAB
APPEARANCE UR: ABNORMAL
COLOR UR AUTO: YELLOW
CRYSTALS AMN MICRO: NORMAL
GLUCOSE UR QL STRIP.AUTO: NEGATIVE MG/DL
KETONES UR QL STRIP.AUTO: NEGATIVE MG/DL
LEUKOCYTE ESTERASE UR QL STRIP.AUTO: NEGATIVE
NITRITE UR QL STRIP.AUTO: NEGATIVE
PH UR STRIP.AUTO: 6.5 [PH] (ref 5–8)
PROT UR QL STRIP: NEGATIVE MG/DL
RBC UR QL AUTO: NEGATIVE
SP GR UR STRIP.AUTO: 1.02 (ref 1–1.03)

## 2021-04-30 PROCEDURE — 99283 EMERGENCY DEPT VISIT LOW MDM: CPT

## 2021-04-30 PROCEDURE — 81002 URINALYSIS NONAUTO W/O SCOPE: CPT

## 2021-04-30 PROCEDURE — 89060 EXAM SYNOVIAL FLUID CRYSTALS: CPT

## 2021-04-30 PROCEDURE — 59025 FETAL NON-STRESS TEST: CPT

## 2021-04-30 ASSESSMENT — PAIN SCALES - GENERAL: PAINLEVEL: 0 - NO PAIN

## 2021-04-30 NOTE — DISCHARGE INSTRUCTIONS
General Instructions:  · If you think you are in labor, time contractions (lying on your left side) from the beginning of one contraction to the beginning of the next contraction for at least one hour.  · Increase fluid intake: you should consume 10-12 8 oz glasses of non-caffeinated fluid per day.  · Report any pressure or burning on urination to your physician.  · Monitor fetal movement: If you notice an absence or decrease in fetal movement, drink a large glass of water and rest on your side.  If there is no increase in movement, call your physician or go to the hospital for further evaluation.  · Report any sudden, sharp abdominal pain.  · Report any bleeding.  Spotting or pinkish discharge is normal after vaginal exam.  You may also spot after sexual intercourse.    Pre-term Labor (<37 weeks):  Call your physician or return to the hospital if:  · You have painless regular contractions more than 4 in one hour.  · Your water breaks (remember time and color).  · You have menstrual-like cramps, a low dull backache or pressure in your pelvis or back.  · Your baby does not move enough to complete the daily kick count (10 movements in 2 hours).  · Your baby moves much less often than on the days before or you have not felt your baby move all day.  · Please review the MEDICATION LIST section of your AFTER VISIT SUMMARY document.  · Take your medication as prescribed      Round Ligament Pain    The round ligament is a cord of muscle and tissue that helps support the uterus. It can become a source of pain during pregnancy if it becomes stretched or twisted as the baby grows. The pain usually begins in the second trimester (13-28 weeks) of pregnancy, and it can come and go until the baby is delivered. It is not a serious problem, and it does not cause harm to the baby.  Round ligament pain is usually a short, sharp, and pinching pain, but it can also be a dull, lingering, and aching pain. The pain is felt in the lower  side of the abdomen or in the groin. It usually starts deep in the groin and moves up to the outside of the hip area. The pain may occur when you:  · Suddenly change position, such as quickly going from a sitting to standing position.  · Roll over in bed.  · Cough or sneeze.  · Do physical activity.  Follow these instructions at home:    · Watch your condition for any changes.  · When the pain starts, relax. Then try any of these methods to help with the pain:  ? Sitting down.  ? Flexing your knees up to your abdomen.  ? Lying on your side with one pillow under your abdomen and another pillow between your legs.  ? Sitting in a warm bath for 15-20 minutes or until the pain goes away.  · Take over-the-counter and prescription medicines only as told by your health care provider.  · Move slowly when you sit down or stand up.  · Avoid long walks if they cause pain.  · Stop or reduce your physical activities if they cause pain.  · Keep all follow-up visits as told by your health care provider. This is important.  Contact a health care provider if:  · Your pain does not go away with treatment.  · You feel pain in your back that you did not have before.  · Your medicine is not helping.  Get help right away if:  · You have a fever or chills.  · You develop uterine contractions.  · You have vaginal bleeding.  · You have nausea or vomiting.  · You have diarrhea.  · You have pain when you urinate.  Summary  · Round ligament pain is felt in the lower abdomen or groin. It is usually a short, sharp, and pinching pain. It can also be a dull, lingering, and aching pain.  · This pain usually begins in the second trimester (13-28 weeks). It occurs because the uterus is stretching with the growing baby, and it is not harmful to the baby.  · You may notice the pain when you suddenly change position, when you cough or sneeze, or during physical activity.  · Relaxing, flexing your knees to your abdomen, lying on one side, or taking a warm  bath may help to get rid of the pain.  · Get help from your health care provider if the pain does not go away or if you have vaginal bleeding, nausea, vomiting, diarrhea, or painful urination.  This information is not intended to replace advice given to you by your health care provider. Make sure you discuss any questions you have with your health care provider.  Document Released: 09/26/2009 Document Revised: 06/05/2019 Document Reviewed: 06/05/2019  Elsevier Patient Education © 2020 [employer]    Other Instructions:  Please carefully review your entire AFTER VISIT SUMMARY document for all discharge instructions.

## 2021-04-30 NOTE — PROGRESS NOTES
Pt presents to L&D with complaints of LOF. Pt ambulated to L[facility] 2 for assessment.     0715 TOCO and EFM applied, VSS. PT reports +FM, denies any VB but states she did notice a small gush around 0530 and then another small gush a little while later. Pt states her underwear just felt damp but did not need a pad. Pt also complains about some discomfort in her pelvic area, especially at the end of the day. Pt educated on round ligament pain and methods to take to help relieve that discomfort. SSE exam performed, small amount of watery white discharge, FERN collected. Pt educated on POC.     0740 Dr. Pineda updated on pt status.     0830 FERN negative, Dr. Pineda updated. Will be down to see pt shortly.     0850 Dr. Pineda at bedside, POC discussed.     0910 Orders for discharge received.     0915 RN at bedside,  labor precautions given and all questions answered.     0922 Pt discharged home in stable condition.

## 2021-04-30 NOTE — ED PROVIDER NOTES
[facility] LABOR AND DELIVERY TRIAGE PROGRESS NOTE    PATIENT ID:  NAME:  Karol Baca  MRN:               [id number]  YOB: 1995     26 y.o. female  at 35w0d.    Subjective: Patient presents with perceived loss of fluid this morning at about 05 30.  She states that she woke up and noticed that her underwear was wet, however denies a large pooling of fluid.  Fluid was clear. No other symptoms or complaints at this time.  Pregnancy complicated by short cervix at 2.1cm on vaginal progesterone.    negative  For CTXS.   negative Feels pain   positive for LOF  negative for vaginal bleeding.   positive for fetal movement    ROS: Patient denies any fever chills, nausea, vomiting, headache, chest pain, shortness of breath, or dysuria or unusual swelling of hands or feet.     Past Medical History:   Diagnosis Date   • Anxiety    • Endometriosis      Past Surgical History:   Procedure Laterality Date   • GYN SURGERY      exploratory lap   • OTHER ORTHOPEDIC SURGERY Right     frx arm   • WIDE EXCISION MELANOMA, LEG, W/POSS.STSG       Family History   Problem Relation Age of Onset   • Stroke Maternal Grandmother    • Other Maternal Grandmother         PCOS   • Psychiatric Illness Maternal Grandmother    • Cancer Maternal Grandfather         Prostate   • Heart Disease Paternal Grandmother    • Psychiatric Illness Mother    • Heart Disease Father    • No Known Problems Brother    • No Known Problems Brother    • No Known Problems Brother      Social History     Socioeconomic History   • Marital status: Single     Spouse name: Not on file   • Number of children: Not on file   • Years of education: Not on file   • Highest education level: 12th grade   Occupational History     Comment: [employer]   Tobacco Use   • Smoking status: Former Smoker     Packs/day: 0.75     Years: 7.00     Pack years: 5.25     Types: Cigarettes     Quit date: 2020     Years since quittin.8   • Smokeless tobacco:  Never Used   Substance and Sexual Activity   • Alcohol use: Not Currently   • Drug use: Not Currently     Comment: marijuana   • Sexual activity: Yes     Partners: Male     Comment: Monogomous relationship   Other Topics Concern   • Not on file   Social History Narrative   • Not on file     Social Determinants of Health     Financial Resource Strain: Medium Risk   • Difficulty of Paying Living Expenses: Somewhat hard   Food Insecurity: No Food Insecurity   • Worried About Running Out of Food in the Last Year: Never true   • Ran Out of Food in the Last Year: Never true   Transportation Needs: No Transportation Needs   • Lack of Transportation (Medical): No   • Lack of Transportation (Non-Medical): No   Physical Activity: Sufficiently Active   • Days of Exercise per Week: 4 days   • Minutes of Exercise per Session: 70 min   Stress: No Stress Concern Present   • Feeling of Stress : Not at all   Social Connections: Moderately Isolated   • Frequency of Communication with Friends and Family: More than three times a week   • Frequency of Social Gatherings with Friends and Family: Once a week   • Attends Mandaen Services: Never   • Active Member of Clubs or Organizations: No   • Attends Club or Organization Meetings: Never   • Marital Status: Never    Intimate Partner Violence:    • Fear of Current or Ex-Partner:    • Emotionally Abused:    • Physically Abused:    • Sexually Abused:          Objective:    Vitals:    21 0729 21 0746 21 0755 21 0805   BP:  117/71 111/65 118/65   Pulse:  72 (!) 58 60   Resp: 16      Temp:       TempSrc:       SpO2:       Weight:       Height:         Temp (24hrs), Av.7 °C (98 °F), Min:36.7 °C (98 °F), Max:36.7 °C (98 °F)    General: No acute distress, resting comfortably in bed.  HEENT: normocephalic, nontraumatic, PERRLA, EOMI  Cardiovascular: Heart RRR with no murmurs, rubs or gallops. Distal Pulses 2+  Respiratory: symmetric chest expansion, lungs CTAB,  with no wheezes, rales, rhonci  Abdomen: gravid, nontender  Musculoskeletal: strength 5/5 in four extremities  Neuro: non focal with no numbness, tingling or changes in sensation    [place]: No UCs noted  FHRM: Baseline 120, Accels to 160, no decels, mod variability. Reactive NST    Assessment: 26 y.o. female  at 35w0d.    Plan:   1. Patient is cleared to return home with family. Encouraged to see MD/DO for increased painful uterine contractions @ 3-5, vaginal bleeding, loss of fluid, or other serious symptoms.  2. No pooling of fluid noted by MARY ANN Mireles during sterile speculum exam. Ferning test negative. Likely increased vaginal discharge 2/2 vaginal progesterone and gestation at 35w0d. Patient educated on vaginal discharge during pregnancy.  3. Vital signs wnl, hemodynamically stable. BP normotensive.   4. Follow up at next scheduled prenatal appointment.    Discussed case with Dr. Damon, [facility] Attending. Case was discussed and attending agreed with plan prior to discharge of patient.    Cam Pineda D.O.

## 2021-05-10 ENCOUNTER — HOSPITAL ENCOUNTER (OUTPATIENT)
Facility: MEDICAL CENTER | Age: 26
End: 2021-05-10
Attending: OBSTETRICS & GYNECOLOGY
Payer: COMMERCIAL

## 2021-05-10 ENCOUNTER — ROUTINE PRENATAL (OUTPATIENT)
Dept: OBGYN | Facility: CLINIC | Age: 26
End: 2021-05-10
Payer: COMMERCIAL

## 2021-05-10 VITALS — SYSTOLIC BLOOD PRESSURE: 107 MMHG | BODY MASS INDEX: 28.41 KG/M2 | WEIGHT: 170.7 LBS | DIASTOLIC BLOOD PRESSURE: 64 MMHG

## 2021-05-10 DIAGNOSIS — Z34.93 THIRD TRIMESTER PREGNANCY: ICD-10-CM

## 2021-05-10 PROCEDURE — 90040 PR PRENATAL FOLLOW UP: CPT | Performed by: OBSTETRICS & GYNECOLOGY

## 2021-05-10 PROCEDURE — 87081 CULTURE SCREEN ONLY: CPT

## 2021-05-10 PROCEDURE — 87150 DNA/RNA AMPLIFIED PROBE: CPT

## 2021-05-10 NOTE — PROGRESS NOTES
Pt is here for OB follow-up  No VB, UC or LOF.  Good phone #:[phone]  Pharmacy verified.  Pt states no other complaints for today.  GBS today

## 2021-05-11 LAB — GP B STREP DNA SPEC QL NAA+PROBE: NEGATIVE

## 2021-05-17 ENCOUNTER — ROUTINE PRENATAL (OUTPATIENT)
Dept: OBGYN | Facility: CLINIC | Age: 26
End: 2021-05-17
Payer: COMMERCIAL

## 2021-05-17 VITALS — SYSTOLIC BLOOD PRESSURE: 126 MMHG | BODY MASS INDEX: 29.12 KG/M2 | DIASTOLIC BLOOD PRESSURE: 71 MMHG | WEIGHT: 175 LBS

## 2021-05-17 DIAGNOSIS — Z28.39 RUBELLA NON-IMMUNE STATUS, ANTEPARTUM: ICD-10-CM

## 2021-05-17 DIAGNOSIS — O09.899 RUBELLA NON-IMMUNE STATUS, ANTEPARTUM: ICD-10-CM

## 2021-05-17 DIAGNOSIS — O09.93 SUPERVISION OF HIGH RISK PREGNANCY IN THIRD TRIMESTER: ICD-10-CM

## 2021-05-17 PROBLEM — O26.872 SHORT CERVIX DURING PREGNANCY IN SECOND TRIMESTER: Status: RESOLVED | Noted: 2021-02-08 | Resolved: 2021-05-17

## 2021-05-17 PROBLEM — O99.513 ASTHMA AFFECTING PREGNANCY IN THIRD TRIMESTER: Status: ACTIVE | Noted: 2020-11-06

## 2021-05-17 PROBLEM — O36.5930 POOR FETAL GROWTH AFFECTING MANAGEMENT OF MOTHER IN THIRD TRIMESTER: Status: RESOLVED | Noted: 2021-03-17 | Resolved: 2021-05-17

## 2021-05-17 PROCEDURE — 90040 PR PRENATAL FOLLOW UP: CPT | Performed by: OBSTETRICS & GYNECOLOGY

## 2021-05-17 NOTE — PROGRESS NOTES
Pt here today for OB follow up  Pt states no VB or LOF   Reports +FM  Good # [phone]      Pharmacy Confirmed.

## 2021-05-24 ENCOUNTER — ANESTHESIA (OUTPATIENT)
Dept: ANESTHESIOLOGY | Facility: MEDICAL CENTER | Age: 26
End: 2021-05-24
Payer: COMMERCIAL

## 2021-05-24 ENCOUNTER — HOSPITAL ENCOUNTER (INPATIENT)
Facility: MEDICAL CENTER | Age: 26
LOS: 1 days | End: 2021-05-25
Attending: OBSTETRICS & GYNECOLOGY | Admitting: FAMILY MEDICINE
Payer: COMMERCIAL

## 2021-05-24 ENCOUNTER — ANESTHESIA EVENT (OUTPATIENT)
Dept: ANESTHESIOLOGY | Facility: MEDICAL CENTER | Age: 26
End: 2021-05-24
Payer: COMMERCIAL

## 2021-05-24 ENCOUNTER — HOSPITAL ENCOUNTER (EMERGENCY)
Facility: MEDICAL CENTER | Age: 26
End: 2021-05-24
Attending: OBSTETRICS & GYNECOLOGY | Admitting: OBSTETRICS & GYNECOLOGY
Payer: COMMERCIAL

## 2021-05-24 VITALS
TEMPERATURE: 97.3 F | RESPIRATION RATE: 20 BRPM | HEIGHT: 65 IN | HEART RATE: 83 BPM | BODY MASS INDEX: 29.16 KG/M2 | SYSTOLIC BLOOD PRESSURE: 129 MMHG | OXYGEN SATURATION: 96 % | WEIGHT: 175 LBS | DIASTOLIC BLOOD PRESSURE: 74 MMHG

## 2021-05-24 LAB
BASOPHILS # BLD AUTO: 0.5 % (ref 0–1.8)
BASOPHILS # BLD: 0.1 K/UL (ref 0–0.12)
EOSINOPHIL # BLD AUTO: 0.06 K/UL (ref 0–0.51)
EOSINOPHIL NFR BLD: 0.3 % (ref 0–6.9)
ERYTHROCYTE [DISTWIDTH] IN BLOOD BY AUTOMATED COUNT: 42.2 FL (ref 35.9–50)
HCT VFR BLD AUTO: 39.8 % (ref 37–47)
HGB BLD-MCNC: 13.7 G/DL (ref 12–16)
HOLDING TUBE BB 8507: NORMAL
IMM GRANULOCYTES # BLD AUTO: 0.28 K/UL (ref 0–0.11)
IMM GRANULOCYTES NFR BLD AUTO: 1.4 % (ref 0–0.9)
LYMPHOCYTES # BLD AUTO: 1.47 K/UL (ref 1–4.8)
LYMPHOCYTES NFR BLD: 7.4 % (ref 22–41)
MCH RBC QN AUTO: 31.4 PG (ref 27–33)
MCHC RBC AUTO-ENTMCNC: 34.4 G/DL (ref 33.6–35)
MCV RBC AUTO: 91.1 FL (ref 81.4–97.8)
MONOCYTES # BLD AUTO: 1.09 K/UL (ref 0–0.85)
MONOCYTES NFR BLD AUTO: 5.5 % (ref 0–13.4)
NEUTROPHILS # BLD AUTO: 16.84 K/UL (ref 2–7.15)
NEUTROPHILS NFR BLD: 84.9 % (ref 44–72)
NRBC # BLD AUTO: 0 K/UL
NRBC BLD-RTO: 0 /100 WBC
PLATELET # BLD AUTO: 288 K/UL (ref 164–446)
PMV BLD AUTO: 10.7 FL (ref 9–12.9)
RBC # BLD AUTO: 4.37 M/UL (ref 4.2–5.4)
SARS-COV+SARS-COV-2 AG RESP QL IA.RAPID: NOTDETECTED
SARS-COV-2 RNA RESP QL NAA+PROBE: NOTDETECTED
SPECIMEN SOURCE: NORMAL
SPECIMEN SOURCE: NORMAL
WBC # BLD AUTO: 19.8 K/UL (ref 4.8–10.8)

## 2021-05-24 PROCEDURE — 700111 HCHG RX REV CODE 636 W/ 250 OVERRIDE (IP): Performed by: FAMILY MEDICINE

## 2021-05-24 PROCEDURE — 36415 COLL VENOUS BLD VENIPUNCTURE: CPT

## 2021-05-24 PROCEDURE — 59400 OBSTETRICAL CARE: CPT | Performed by: FAMILY MEDICINE

## 2021-05-24 PROCEDURE — 700111 HCHG RX REV CODE 636 W/ 250 OVERRIDE (IP): Performed by: ANESTHESIOLOGY

## 2021-05-24 PROCEDURE — 303615 HCHG EPIDURAL/SPINAL ANESTHESIA FOR LABOR

## 2021-05-24 PROCEDURE — 85025 COMPLETE CBC W/AUTO DIFF WBC: CPT

## 2021-05-24 PROCEDURE — 770002 HCHG ROOM/CARE - OB PRIVATE (112)

## 2021-05-24 PROCEDURE — 700102 HCHG RX REV CODE 250 W/ 637 OVERRIDE(OP): Performed by: FAMILY MEDICINE

## 2021-05-24 PROCEDURE — 700105 HCHG RX REV CODE 258: Performed by: FAMILY MEDICINE

## 2021-05-24 PROCEDURE — 87426 SARSCOV CORONAVIRUS AG IA: CPT

## 2021-05-24 PROCEDURE — 59025 FETAL NON-STRESS TEST: CPT

## 2021-05-24 PROCEDURE — 700111 HCHG RX REV CODE 636 W/ 250 OVERRIDE (IP)

## 2021-05-24 PROCEDURE — U0005 INFEC AGEN DETEC AMPLI PROBE: HCPCS

## 2021-05-24 PROCEDURE — 700111 HCHG RX REV CODE 636 W/ 250 OVERRIDE (IP): Performed by: NURSE PRACTITIONER

## 2021-05-24 PROCEDURE — 59409 OBSTETRICAL CARE: CPT

## 2021-05-24 PROCEDURE — 304965 HCHG RECOVERY SERVICES

## 2021-05-24 PROCEDURE — U0003 INFECTIOUS AGENT DETECTION BY NUCLEIC ACID (DNA OR RNA); SEVERE ACUTE RESPIRATORY SYNDROME CORONAVIRUS 2 (SARS-COV-2) (CORONAVIRUS DISEASE [COVID-19]), AMPLIFIED PROBE TECHNIQUE, MAKING USE OF HIGH THROUGHPUT TECHNOLOGIES AS DESCRIBED BY CMS-2020-01-R: HCPCS

## 2021-05-24 PROCEDURE — A9270 NON-COVERED ITEM OR SERVICE: HCPCS | Performed by: FAMILY MEDICINE

## 2021-05-24 PROCEDURE — 99282 EMERGENCY DEPT VISIT SF MDM: CPT

## 2021-05-24 RX ORDER — IBUPROFEN 600 MG/1
600 TABLET ORAL EVERY 6 HOURS PRN
Status: DISCONTINUED | OUTPATIENT
Start: 2021-05-24 | End: 2021-05-25 | Stop reason: HOSPADM

## 2021-05-24 RX ORDER — SODIUM CHLORIDE, SODIUM LACTATE, POTASSIUM CHLORIDE, AND CALCIUM CHLORIDE .6; .31; .03; .02 G/100ML; G/100ML; G/100ML; G/100ML
250 INJECTION, SOLUTION INTRAVENOUS PRN
Status: DISCONTINUED | OUTPATIENT
Start: 2021-05-24 | End: 2021-05-24 | Stop reason: HOSPADM

## 2021-05-24 RX ORDER — ROPIVACAINE HYDROCHLORIDE 2 MG/ML
INJECTION, SOLUTION EPIDURAL; INFILTRATION; PERINEURAL CONTINUOUS
Status: DISCONTINUED | OUTPATIENT
Start: 2021-05-24 | End: 2021-05-24 | Stop reason: HOSPADM

## 2021-05-24 RX ORDER — SODIUM CHLORIDE, SODIUM LACTATE, POTASSIUM CHLORIDE, AND CALCIUM CHLORIDE .6; .31; .03; .02 G/100ML; G/100ML; G/100ML; G/100ML
1000 INJECTION, SOLUTION INTRAVENOUS
Status: DISCONTINUED | OUTPATIENT
Start: 2021-05-24 | End: 2021-05-24 | Stop reason: HOSPADM

## 2021-05-24 RX ORDER — DEXTROSE, SODIUM CHLORIDE, SODIUM LACTATE, POTASSIUM CHLORIDE, AND CALCIUM CHLORIDE 5; .6; .31; .03; .02 G/100ML; G/100ML; G/100ML; G/100ML; G/100ML
INJECTION, SOLUTION INTRAVENOUS CONTINUOUS
Status: DISCONTINUED | OUTPATIENT
Start: 2021-05-24 | End: 2021-05-24 | Stop reason: HOSPADM

## 2021-05-24 RX ORDER — ALUMINA, MAGNESIA, AND SIMETHICONE 2400; 2400; 240 MG/30ML; MG/30ML; MG/30ML
30 SUSPENSION ORAL EVERY 6 HOURS PRN
Status: DISCONTINUED | OUTPATIENT
Start: 2021-05-24 | End: 2021-05-24 | Stop reason: HOSPADM

## 2021-05-24 RX ORDER — ROPIVACAINE HYDROCHLORIDE 2 MG/ML
INJECTION, SOLUTION EPIDURAL; INFILTRATION
Status: DISCONTINUED | OUTPATIENT
Start: 2021-05-24 | End: 2021-05-24 | Stop reason: SURG

## 2021-05-24 RX ORDER — MISOPROSTOL 200 UG/1
800 TABLET ORAL
Status: DISCONTINUED | OUTPATIENT
Start: 2021-05-24 | End: 2021-05-24 | Stop reason: HOSPADM

## 2021-05-24 RX ORDER — DOCUSATE SODIUM 100 MG/1
100 CAPSULE, LIQUID FILLED ORAL 2 TIMES DAILY PRN
Status: DISCONTINUED | OUTPATIENT
Start: 2021-05-24 | End: 2021-05-25 | Stop reason: HOSPADM

## 2021-05-24 RX ORDER — ROPIVACAINE HYDROCHLORIDE 2 MG/ML
INJECTION, SOLUTION EPIDURAL; INFILTRATION; PERINEURAL
Status: COMPLETED
Start: 2021-05-24 | End: 2021-05-24

## 2021-05-24 RX ORDER — OXYCODONE HYDROCHLORIDE AND ACETAMINOPHEN 5; 325 MG/1; MG/1
1 TABLET ORAL EVERY 4 HOURS PRN
Status: DISCONTINUED | OUTPATIENT
Start: 2021-05-24 | End: 2021-05-25 | Stop reason: HOSPADM

## 2021-05-24 RX ORDER — BUPIVACAINE HYDROCHLORIDE 2.5 MG/ML
INJECTION, SOLUTION EPIDURAL; INFILTRATION; INTRACAUDAL PRN
Status: DISCONTINUED | OUTPATIENT
Start: 2021-05-24 | End: 2021-05-24 | Stop reason: SURG

## 2021-05-24 RX ORDER — BISACODYL 10 MG
10 SUPPOSITORY, RECTAL RECTAL PRN
Status: DISCONTINUED | OUTPATIENT
Start: 2021-05-24 | End: 2021-05-25 | Stop reason: HOSPADM

## 2021-05-24 RX ORDER — MORPHINE SULFATE 4 MG/ML
2 INJECTION, SOLUTION INTRAMUSCULAR; INTRAVENOUS ONCE
Status: COMPLETED | OUTPATIENT
Start: 2021-05-24 | End: 2021-05-24

## 2021-05-24 RX ORDER — MISOPROSTOL 200 UG/1
600 TABLET ORAL
Status: DISCONTINUED | OUTPATIENT
Start: 2021-05-24 | End: 2021-05-25 | Stop reason: HOSPADM

## 2021-05-24 RX ORDER — ONDANSETRON 4 MG/1
4 TABLET, ORALLY DISINTEGRATING ORAL EVERY 6 HOURS PRN
Status: DISCONTINUED | OUTPATIENT
Start: 2021-05-24 | End: 2021-05-25 | Stop reason: HOSPADM

## 2021-05-24 RX ORDER — ONDANSETRON 2 MG/ML
4 INJECTION INTRAMUSCULAR; INTRAVENOUS EVERY 6 HOURS PRN
Status: DISCONTINUED | OUTPATIENT
Start: 2021-05-24 | End: 2021-05-25 | Stop reason: HOSPADM

## 2021-05-24 RX ORDER — METHYLERGONOVINE MALEATE 0.2 MG/ML
0.2 INJECTION INTRAVENOUS
Status: DISCONTINUED | OUTPATIENT
Start: 2021-05-24 | End: 2021-05-25 | Stop reason: HOSPADM

## 2021-05-24 RX ORDER — SODIUM CHLORIDE, SODIUM LACTATE, POTASSIUM CHLORIDE, CALCIUM CHLORIDE 600; 310; 30; 20 MG/100ML; MG/100ML; MG/100ML; MG/100ML
INJECTION, SOLUTION INTRAVENOUS CONTINUOUS
Status: ACTIVE | OUTPATIENT
Start: 2021-05-24 | End: 2021-05-24

## 2021-05-24 RX ORDER — SODIUM CHLORIDE, SODIUM LACTATE, POTASSIUM CHLORIDE, CALCIUM CHLORIDE 600; 310; 30; 20 MG/100ML; MG/100ML; MG/100ML; MG/100ML
INJECTION, SOLUTION INTRAVENOUS PRN
Status: DISCONTINUED | OUTPATIENT
Start: 2021-05-24 | End: 2021-05-25 | Stop reason: HOSPADM

## 2021-05-24 RX ORDER — BUPIVACAINE HYDROCHLORIDE 2.5 MG/ML
INJECTION, SOLUTION EPIDURAL; INFILTRATION; INTRACAUDAL
Status: COMPLETED
Start: 2021-05-24 | End: 2021-05-24

## 2021-05-24 RX ORDER — CARBOPROST TROMETHAMINE 250 UG/ML
250 INJECTION, SOLUTION INTRAMUSCULAR
Status: DISCONTINUED | OUTPATIENT
Start: 2021-05-24 | End: 2021-05-25 | Stop reason: HOSPADM

## 2021-05-24 RX ADMIN — MORPHINE SULFATE 2 MG: 4 INJECTION INTRAVENOUS at 04:56

## 2021-05-24 RX ADMIN — BUPIVACAINE HYDROCHLORIDE 10 ML: 2.5 INJECTION, SOLUTION EPIDURAL; INFILTRATION; INTRACAUDAL; PERINEURAL at 12:08

## 2021-05-24 RX ADMIN — IBUPROFEN 600 MG: 600 TABLET, FILM COATED ORAL at 23:21

## 2021-05-24 RX ADMIN — FENTANYL CITRATE 100 MCG: 50 INJECTION, SOLUTION INTRAMUSCULAR; INTRAVENOUS at 11:12

## 2021-05-24 RX ADMIN — OXYTOCIN 125 ML/HR: 10 INJECTION, SOLUTION INTRAMUSCULAR; INTRAVENOUS at 17:09

## 2021-05-24 RX ADMIN — OXYTOCIN 2000 ML/HR: 10 INJECTION, SOLUTION INTRAMUSCULAR; INTRAVENOUS at 15:35

## 2021-05-24 RX ADMIN — ROPIVACAINE HYDROCHLORIDE 10 ML/HR: 2 INJECTION, SOLUTION EPIDURAL; INFILTRATION at 12:12

## 2021-05-24 RX ADMIN — ROPIVACAINE HYDROCHLORIDE: 2 INJECTION, SOLUTION EPIDURAL; INFILTRATION at 12:05

## 2021-05-24 RX ADMIN — SODIUM CHLORIDE, POTASSIUM CHLORIDE, SODIUM LACTATE AND CALCIUM CHLORIDE: 600; 310; 30; 20 INJECTION, SOLUTION INTRAVENOUS at 11:14

## 2021-05-24 RX ADMIN — ROPIVACAINE HYDROCHLORIDE: 2 INJECTION, SOLUTION EPIDURAL; INFILTRATION; PERINEURAL at 12:05

## 2021-05-24 ASSESSMENT — LIFESTYLE VARIABLES
EVER HAD A DRINK FIRST THING IN THE MORNING TO STEADY YOUR NERVES TO GET RID OF A HANGOVER: NO
TOTAL SCORE: 0
HAVE YOU EVER FELT YOU SHOULD CUT DOWN ON YOUR DRINKING: NO
EVER_SMOKED: NEVER
TOTAL SCORE: 0
TOTAL SCORE: 0
DOES PATIENT WANT TO STOP DRINKING: NO
CONSUMPTION TOTAL: INCOMPLETE
EVER FELT BAD OR GUILTY ABOUT YOUR DRINKING: NO
ALCOHOL_USE: NO
HAVE PEOPLE ANNOYED YOU BY CRITICIZING YOUR DRINKING: NO

## 2021-05-24 ASSESSMENT — PATIENT HEALTH QUESTIONNAIRE - PHQ9
1. LITTLE INTEREST OR PLEASURE IN DOING THINGS: NOT AT ALL
2. FEELING DOWN, DEPRESSED, IRRITABLE, OR HOPELESS: NOT AT ALL
SUM OF ALL RESPONSES TO PHQ9 QUESTIONS 1 AND 2: 0
2. FEELING DOWN, DEPRESSED, IRRITABLE, OR HOPELESS: NOT AT ALL
1. LITTLE INTEREST OR PLEASURE IN DOING THINGS: NOT AT ALL
SUM OF ALL RESPONSES TO PHQ9 QUESTIONS 1 AND 2: 0

## 2021-05-24 ASSESSMENT — PAIN DESCRIPTION - PAIN TYPE
TYPE: ACUTE PAIN
TYPE: ACUTE PAIN

## 2021-05-24 ASSESSMENT — PAIN SCALES - GENERAL: PAIN_LEVEL: 0

## 2021-05-24 ASSESSMENT — COPD QUESTIONNAIRES
COPD SCREENING SCORE: 0
DURING THE PAST 4 WEEKS HOW MUCH DID YOU FEEL SHORT OF BREATH: NONE/LITTLE OF THE TIME
DO YOU EVER COUGH UP ANY MUCUS OR PHLEGM?: NO/ONLY WITH OCCASIONAL COLDS OR INFECTIONS
HAVE YOU SMOKED AT LEAST 100 CIGARETTES IN YOUR ENTIRE LIFE: NO/DON'T KNOW
IN THE PAST 12 MONTHS DO YOU DO LESS THAN YOU USED TO BECAUSE OF YOUR BREATHING PROBLEMS: DISAGREE/UNSURE

## 2021-05-24 NOTE — PROGRESS NOTES
Patient comes in with complaints of increasing contractions.  Denies leaking or bleeding.  Feels fetal movement, SVE 6/100/-1.  Discussed with Dr Valadez. Patient to be admitted. Initial BP's elevated, discussed with Dr Valadez, patient was having contraction and in hands and knees positioning for these BPs, labs ordered.  Report given to Earlene REESE.

## 2021-05-24 NOTE — CARE PLAN
Problem: Risk for Infection and Impaired Wound Healing  Goal: Patient will remain free from infection  Outcome: Progressing     Problem: Pain  Goal: Patient's pain will be alleviated or reduced to the patient’s comfort goal  Outcome: Progressing   The patient is Stable - Low risk of patient condition declining or worsening         Progress made toward(s) clinical / shift goals:  Progressing as expected, comfortable with an epidural in place.  Patient is not progressing towards the following goals:

## 2021-05-24 NOTE — ANESTHESIA PREPROCEDURE EVALUATION
Relevant Problems   PULMONARY   (positive) Asthma affecting pregnancy in third trimester      CARDIAC   (positive) Migraine without aura       Physical Exam    Airway   Mallampati: II  TM distance: >3 FB  Neck ROM: full       Cardiovascular - normal exam  Rhythm: regular  Rate: normal  (-) murmur     Dental - normal exam           Pulmonary - normal exam  Breath sounds clear to auscultation     Abdominal    Neurological - normal exam                 Anesthesia Plan    ASA 2       Plan - epidural   Neuraxial block will be labor analgesia                  Pertinent diagnostic labs and testing reviewed    Informed Consent:    Anesthetic plan and risks discussed with patient.

## 2021-05-24 NOTE — DISCHARGE INSTRUCTIONS
General Instructions:  · If you think you are in labor, time contractions (lying on your left side) from the beginning of one contraction to the beginning of the next contraction for at least one hour.  · Increase fluid intake: you should consume 10-12 8 oz glasses of non-caffeinated fluid per day.  · Report any pressure or burning on urination to your physician.  · Monitor fetal movement: If you notice an absence or decrease in fetal movement, drink a large glass of water and rest on your side.  If there is no increase in movement, call your physician or go to the hospital for further evaluation.  · Report any sudden, sharp abdominal pain.  · Report any bleeding.  Spotting or pinkish discharge is normal after vaginal exam.  You may also spot after sexual intercourse.    Labor Instructions (37 - 39 weeks):  Call your physician or return to hospital if:  · You have regular contractions that get progressively closer, longer and stronger.  · Your water breaks (remember time and color).  · You have bleeding like a period.  · Your baby does not move enough to complete the daily kick counts (10 movements in 2 hours)  · Your baby moves much less often than on the days before or you have not felt your baby move all day.      Other Instructions:  Drink at least a gallon of water a day  otc tylenol, heat packs, warm baths/showers for discomfort.  Please carefully review your entire AFTER VISIT SUMMARY document for all discharge instructions.

## 2021-05-24 NOTE — H&P
LABOR AND DELIVERY HISTORY AND PHYSICAL    PATIENT ID:  NAME:  Karol Baca  MRN:               [id number]  YOB: 1995    CC:  contractions    HPI:  Karol Baca is a 26 y.o. female  at 38w3d by a 10 week ultrasound performed on 2020 not c/w LMP on Patient's last menstrual period was 09/10/2020.. Estimated Date of Delivery: 21  Patient presents complaining of moderate uterine contractions, with no loss of fluid.  normal fetal movement.  no vaginal bleeding.  Pregnancy was complicated by rubella non-immune status.    ROS: Patient denies any fever chills, nausea, vomiting, headache, chest pain, shortness of breath, or dysuria or unusual swelling of hands or feet.     Prenatal Care: Obtained at [facility], 1st visit 2020 with 12 total visits.  Third trimester BPs were approximately 120s/70s.  Total weight gain 20 kg during the pregnancy.    Prenatal Labs:   HepBsAg: neg HIV: neg Rubella: non-imm   RPR: Neg PAP: un GBS: Neg   GC/CT: Neg A+ / ABS Neg Quad Screen: unk   1hr GTT: 81 3hr GTT: N/A HepC: Neg     Recent Labs     21  1105   WBC 19.8*   RBC 4.37   HEMOGLOBIN 13.7   HEMATOCRIT 39.8   MCV 91.1   MCH 31.4   RDW 42.2   PLATELETCT 288   MPV 10.7   NEUTSPOLYS 84.90*   LYMPHOCYTES 7.40*   MONOCYTES 5.50   EOSINOPHILS 0.30   BASOPHILS 0.50     No results for input(s): SODIUM, POTASSIUM, CHLORIDE, CO2, GLUCOSE, BUN, CPKTOTAL in the last 72 hours.       IMAGING:  OB ultrasound:   2021 10:26 AM     HISTORY/REASON FOR EXAM:  Evaluate fetal anatomy     TECHNIQUE/EXAM DESCRIPTION: OB complete ultrasound.  Transabdominal and transvaginal scanning were performed.     COMPARISON:  None     FINDINGS:  Fetal Lie:  Variable  LMP:  2020  Clinical RYANNE by LMP:  2021     Placenta (Location):  Anterior  Placenta Previa: Low-lying  CM away from cervix: 1.3 cm  Placental Grade: I     Amniotic Fluid Volume:  AMOS = 13.4 cm     Fetal Heart Rate:  143 bpm     Cervical Length:  3.2 cm  transvaginal     No maternal adnexal mass is identified.     Umbilical Artery S/D Ratio(s):  Not applicable     Fetal Anatomy  (Seen or Not Seen)  Lateral Ventricles     Seen  Cisterna Magna        Seen  Cerebellum              Seen  CSP             Seen  Orbits             Seen  Face/Lips                Seen  Cord Insertion         Seen  Placental CI         Seen  4 Chamber Heart     Seen  LVOT               Seen  RVOT              Seen  3 Vessel View     Seen  Stomach       Seen  Kidneys                   Seen  Urinary Bladder      Seen  Spine                       Seen  3 Vessel Cord          Seen  Both Upper Extremities    Seen  Both Lower Extremities    Seen  Diaphragm             Seen  Movement       Seen  Gender:  Likely male     Fetal Biometry  BPD    4.7 cm, 20w 1d, (34%)  HC    17.5 cm, 20w 0d, (19%)  AC    15.0 cm, 20w 2d, (33%)  Femur Length    3.0 cm, 19w 2d, (7%)  Humerus Length    3.0 cm, 19w 6d, (26%)  Cerebellum Diameter   1.8 cm     EGA by this US:  19w 6d  RYANNE by this US: 2021  RYANNE by 1st US:  2021     Estimated Fetal Weight:  315 g  EFW Percentile: 13%     Comments:  There is apparent mild funneling of the cervix.     IMPRESSION:     Single intrauterine pregnancy of an estimated gestational age of 19w 6d with an estimated date of delivery of 2021.     Fetal survey within normal limits.     Low lying placenta.     Apparent mild funneling of the cervix.       POB Hx:  OB History    Para Term  AB Living   2       1     SAB TAB Ectopic Molar Multiple Live Births   1                # Outcome Date GA Lbr Obdulio/2nd Weight Sex Delivery Anes PTL Lv   2 Current            1 SAB                PMH/Problem List:    Past Medical History:   Diagnosis Date   • Anxiety    • Endometriosis      Patient Active Problem List    Diagnosis Date Noted   • Rubella non-immune status, antepartum 2020   • Asthma affecting pregnancy in third trimester 2020   • Endometriosis 2017   •  Migraine without aura 2017   • Recurrent major depressive disorder, in full remission (HCC) 2017       Current Outpatient Medications:  No current facility-administered medications on file prior to encounter.     Current Outpatient Medications on File Prior to Encounter   Medication Sig Dispense Refill   • PROGESTERONE, VAGINAL, 4 % Gel Insert  into the vagina.     • Iron-Vitamin C (IRON 100/C PO) Take  by mouth.     • Prenatal Vit-Fe Fumarate-FA (PRENATAL 1+1 PO) Take  by mouth.         PSH:    Past Surgical History:   Procedure Laterality Date   • GYN SURGERY      exploratory lap   • OTHER ORTHOPEDIC SURGERY Right     frx arm   • WIDE EXCISION MELANOMA, LEG, W/POSS.STSG         Allergies:   Allergies   Allergen Reactions   • Paxil [Paroxetine] Unspecified     Gave patient UTI that led to hospitalization   • Sulfa Drugs Hives       SH:  Social History     Socioeconomic History   • Marital status: Single     Spouse name: Not on file   • Number of children: Not on file   • Years of education: Not on file   • Highest education level: 12th grade   Occupational History     Comment: [employer]   Tobacco Use   • Smoking status: Former Smoker     Packs/day: 0.75     Years: 7.00     Pack years: 5.25     Types: Cigarettes     Quit date: 2020     Years since quittin.8   • Smokeless tobacco: Never Used   Vaping Use   • Vaping Use: Never used   Substance and Sexual Activity   • Alcohol use: Not Currently   • Drug use: Not Currently     Comment: marijuana   • Sexual activity: Yes     Partners: Male     Comment: Monogomous relationship   Other Topics Concern   • Not on file   Social History Narrative   • Not on file     Social Determinants of Health     Financial Resource Strain: Medium Risk   • Difficulty of Paying Living Expenses: Somewhat hard   Food Insecurity: No Food Insecurity   • Worried About Running Out of Food in the Last Year: Never true   • Ran Out of Food in the Last Year: Never  "true   Transportation Needs: No Transportation Needs   • Lack of Transportation (Medical): No   • Lack of Transportation (Non-Medical): No   Physical Activity: Sufficiently Active   • Days of Exercise per Week: 4 days   • Minutes of Exercise per Session: 70 min   Stress: No Stress Concern Present   • Feeling of Stress : Not at all   Social Connections: Socially Isolated   • Frequency of Communication with Friends and Family: More than three times a week   • Frequency of Social Gatherings with Friends and Family: Once a week   • Attends Mu-ism Services: Never   • Active Member of Clubs or Organizations: No   • Attends Club or Organization Meetings: Never   • Marital Status: Never    Intimate Partner Violence:    • Fear of Current or Ex-Partner:    • Emotionally Abused:    • Physically Abused:    • Sexually Abused:          PHYSICAL EXAM:  Vitals:    21 1020 21 1022 21 1023 21 1025   BP: 153/67 156/75 153/67    Pulse: 61 86 93    Resp: 20      Temp: 36.6 °C (97.9 °F)      TempSrc: Temporal      SpO2: 96%      Weight:    79.4 kg (175 lb)   Height:    1.651 m (5' 5\")     Temp (24hrs), Av.4 °C (97.6 °F), Min:36.3 °C (97.3 °F), Max:36.6 °C (97.9 °F)    General: No acute distress, resting comfortably in bed.  HEENT: normocephalic, nontraumatic, PERRLA, EOMI  Cardiovascular: Heart RRR with no murmurs, rubs or gallops. Distal Pulses 2+  Respiratory: symmetric chest expansion, lungs CTA bilaterally with no wheezes rales or rhonci  Abdomen: gravid, nontender  Musculoskeletal: strength 5/5 in four extremities  Neuro: non focal with no numbness, tingling or changes in sensation    SVE: 6/100%/-1  [place]: Q5 minutes;   EFM:  Baseline 125   moderate Variability   Accels to 150   Decels none*    A/P: Intrauterine pregancy at 38w3d weeks in active labor labor here for .      Patient Active Problem List    Diagnosis Date Noted   • Rubella non-immune status, antepartum 2020   • Asthma " affecting pregnancy in third trimester 2020   • Endometriosis 2017   • Migraine without aura 2017   • Recurrent major depressive disorder, in full remission (HCC) 2017       1. IUP at term  2. Patient is GBS Neg  3. Anticipating     Michael Valadez M.D.

## 2021-05-24 NOTE — ED PROVIDER NOTES
"S: Pt is a 26 y.o.  at 38w3d with Estimated Date of Delivery: 21 who presents to triage c/o cramping since .  Denies VB or LOF.  Reports good FM.      O: /74   Pulse 83   Temp 36.3 °C (97.3 °F) (Temporal)   Resp 20   Ht 1.651 m (5' 5\")   Wt 79.4 kg (175 lb)   LMP 09/10/2020   SpO2 96%   BMI 29.12 kg/m²          NST: Done and read by me         Indication: Term IUP, rule out labor       FHR: 135       Variability: moderate       Acclerations: present       Decelerations: absent       Reactive: yes, category 1         [place]: No UCs, irritability noted       SVE: 3-4/90/-1 per RN, recheck after 1 hour was 4/90/-1. Of note, she was 3 in the office earlier this week.         A/P  Patient Active Problem List    Diagnosis Date Noted   • Rubella non-immune status, antepartum 2020   • Asthma affecting pregnancy in third trimester 2020   • Endometriosis 2017   • Migraine without aura 2017   • Recurrent major depressive disorder, in full remission (HCC) 2017     Patient is dealing well with the cramping, but tired. Discussed therapeutic rest, she is agreeable. Will give small dose of IM morphine. She has follow up appointment today at 10:30    1.  IUP @ 38w3d  2.  Reactive, category 1 NST.  3.  S/P IM morphine  4.  Early labor  5.  F/u TPC at scheduled appt today.    Elke Desai CNM, APRN      "

## 2021-05-24 NOTE — PROGRESS NOTES
0250- pt is a , 38.3 weeks gestation IUP, with c/o uc's since . No c/o lof, bleeding or dfm. Pt has OB appointment today at 1030. efm and toco placed, vss. sve performed, 3/90/-2. Po hydration given and encouraged.   Elke VALERIO in department, given report, received orders to recheck cervix q 1hr from original exam, if no change, the can be discharge home with follow up at appointment.  0400- pt states uc's are still hurting but not as painful, sve performed, . Elke VALERIO updated, discussed therapeutic rest, received orders for morphine 2mg IM, discharge home with labor precautions. Discussed poc with pt, verbalized agreement.   050- addressed discharge instructions with labor precautions, scheduled OB appointment at 1030, all questions answered, verbalized understanding. Left off floor stable in wheelchair with SO, undelivered.

## 2021-05-24 NOTE — PROGRESS NOTES
1045: Assumed care of pt. AAO, POC discussed, pt desires an epidural for pain management. FOB at bedside. Pt medicated for pain per MAR.

## 2021-05-24 NOTE — L&D DELIVERY NOTE
SPONTANEOUS VAGINAL DELIVERY PRODEDURE NOTE    PATIENT ID:  NAME:  Karol Baca  MRN:               [id number]  YOB: 1995    On 2021 at 15:31, this 26 y.o., now 38w3d , GBS Neg female delivered via  under epidural anesthesia a viable male infant with APGAR scores of 8 and 9 at one and five minutes. There was a double nuchal cord which was reduced and infant was bulb suctioned at delivery. Cord was doubly clamped, cut and infant handed to RN in attendance. An intact placenta was delivered spontaneously at 15:35 with 3 vessel cord. Upon vaginal exam, there was no laceration. Estimated blood loss was 200cc. Patient will be transferred to postpartum in stable condition and infant to  nursery.    Michael Valadez M.D.

## 2021-05-24 NOTE — ANESTHESIA PROCEDURE NOTES
Epidural Block    Date/Time: 5/24/2021 12:00 PM  Performed by: Toby Carmona M.D.  Authorized by: Toby Carmona M.D.     Patient Location:  OB  Start Time:  5/24/2021 12:00 PM  End Time:  5/24/2021 12:10 PM  Reason for Block: labor analgesia    patient identified, IV checked, site marked, risks and benefits discussed, surgical consent, monitors and equipment checked, pre-op evaluation and timeout performed    Patient Position:  Sitting  Prep: ChloraPrep, patient draped and sterile technique    Monitoring:  Blood pressure, continuous pulse oximetry and heart rate  Approach:  Midline  Location:  L2-L3  Injection Technique:  SERGE air  Skin infiltration:  Lidocaine  Strength:  1%  Dose:  3ml  Needle Type:  Tuohy  Needle Gauge:  17 G  Needle Length:  3.5 in  Loss of resistance::  8  Catheter Size:  19 G  Catheter at Skin Depth:  18  Test Dose Result:  Negative

## 2021-05-25 ENCOUNTER — PHARMACY VISIT (OUTPATIENT)
Dept: PHARMACY | Facility: MEDICAL CENTER | Age: 26
End: 2021-05-25
Payer: COMMERCIAL

## 2021-05-25 VITALS
HEART RATE: 79 BPM | HEIGHT: 65 IN | TEMPERATURE: 98.4 F | WEIGHT: 175 LBS | RESPIRATION RATE: 16 BRPM | OXYGEN SATURATION: 93 % | BODY MASS INDEX: 29.16 KG/M2 | DIASTOLIC BLOOD PRESSURE: 51 MMHG | SYSTOLIC BLOOD PRESSURE: 106 MMHG

## 2021-05-25 LAB
ERYTHROCYTE [DISTWIDTH] IN BLOOD BY AUTOMATED COUNT: 43.8 FL (ref 35.9–50)
HCT VFR BLD AUTO: 35.4 % (ref 37–47)
HGB BLD-MCNC: 12 G/DL (ref 12–16)
MCH RBC QN AUTO: 31.4 PG (ref 27–33)
MCHC RBC AUTO-ENTMCNC: 33.9 G/DL (ref 33.6–35)
MCV RBC AUTO: 92.7 FL (ref 81.4–97.8)
PLATELET # BLD AUTO: 269 K/UL (ref 164–446)
PMV BLD AUTO: 11 FL (ref 9–12.9)
RBC # BLD AUTO: 3.82 M/UL (ref 4.2–5.4)
WBC # BLD AUTO: 17.9 K/UL (ref 4.8–10.8)

## 2021-05-25 PROCEDURE — RXMED WILLOW AMBULATORY MEDICATION CHARGE: Performed by: STUDENT IN AN ORGANIZED HEALTH CARE EDUCATION/TRAINING PROGRAM

## 2021-05-25 PROCEDURE — A9270 NON-COVERED ITEM OR SERVICE: HCPCS | Performed by: FAMILY MEDICINE

## 2021-05-25 PROCEDURE — 700102 HCHG RX REV CODE 250 W/ 637 OVERRIDE(OP): Performed by: FAMILY MEDICINE

## 2021-05-25 PROCEDURE — 85027 COMPLETE CBC AUTOMATED: CPT

## 2021-05-25 PROCEDURE — 36415 COLL VENOUS BLD VENIPUNCTURE: CPT

## 2021-05-25 RX ORDER — PSEUDOEPHEDRINE HCL 30 MG
100 TABLET ORAL 2 TIMES DAILY PRN
Qty: 60 CAPSULE | Refills: 0 | Status: SHIPPED | OUTPATIENT
Start: 2021-05-25 | End: 2022-02-03

## 2021-05-25 RX ORDER — IBUPROFEN 800 MG/1
800 TABLET ORAL EVERY 6 HOURS PRN
Qty: 60 TABLET | Refills: 0 | Status: SHIPPED | OUTPATIENT
Start: 2021-05-25 | End: 2022-02-03

## 2021-05-25 RX ADMIN — IBUPROFEN 600 MG: 600 TABLET, FILM COATED ORAL at 05:15

## 2021-05-25 RX ADMIN — IBUPROFEN 600 MG: 600 TABLET, FILM COATED ORAL at 10:57

## 2021-05-25 ASSESSMENT — PAIN DESCRIPTION - PAIN TYPE
TYPE: ACUTE PAIN

## 2021-05-25 ASSESSMENT — EDINBURGH POSTNATAL DEPRESSION SCALE (EPDS)
I HAVE BEEN ANXIOUS OR WORRIED FOR NO GOOD REASON: HARDLY EVER
I HAVE BEEN ABLE TO LAUGH AND SEE THE FUNNY SIDE OF THINGS: AS MUCH AS I ALWAYS COULD
I HAVE BEEN SO UNHAPPY THAT I HAVE HAD DIFFICULTY SLEEPING: NOT AT ALL
I HAVE FELT SCARED OR PANICKY FOR NO GOOD REASON: NO, NOT AT ALL
I HAVE BLAMED MYSELF UNNECESSARILY WHEN THINGS WENT WRONG: NOT VERY OFTEN
I HAVE LOOKED FORWARD WITH ENJOYMENT TO THINGS: AS MUCH AS I EVER DID
THINGS HAVE BEEN GETTING ON TOP OF ME: NO, I HAVE BEEN COPING AS WELL AS EVER
I HAVE BEEN SO UNHAPPY THAT I HAVE BEEN CRYING: NO, NEVER
THE THOUGHT OF HARMING MYSELF HAS OCCURRED TO ME: NEVER
I HAVE FELT SAD OR MISERABLE: NO, NOT AT ALL

## 2021-05-25 NOTE — ANESTHESIA TIME REPORT
Anesthesia Start and Stop Event Times     Date Time Event    5/24/2021 1150 Ready for Procedure     1158 Anesthesia Start     1531 Anesthesia Stop        Responsible Staff  05/24/21    Name Role Begin End    Toby Carmona M.D. Anesth 1158 1531        Preop Diagnosis (Free Text):  Pre-op Diagnosis             Preop Diagnosis (Codes):    Post op Diagnosis  Vaginal delivery      Premium Reason  A. 3PM - 7AM    Comments:

## 2021-05-25 NOTE — PROGRESS NOTES
Pt assisted up to bathroom without difficulty, unable to void at this time, arcadio care performed and pad changed, denies pain at this time. Pt transferred to PP room 333 via wheelchair, bedside report given to Clara REESE. Bands verified. Estefanía Florentino R.N.

## 2021-05-25 NOTE — PROGRESS NOTES
Received patient from labor and delivery via wheelchair with Chelita REESE. Pt has not yet voided after delivery.  Assessment done. Fundus firm. Lochia scant to light. Instructed patient to increase fluid intake and to void as needed and to watch for increased bleeding. Patient denies pain at this time. Call light within reach. Will continue to monitor VS.    Pt did void prior to 2100.

## 2021-05-25 NOTE — PROGRESS NOTES
POSTPARTUM    PROGRESS  NOTE;    PATIENT ID:  NAME:  Karol Baca  MRN:               [id number]  YOB: 1995     26 y.o. female  at 38w3d PPD#1 s/p     Subjective: No complaints    Objective:    Vitals:    21 1700 21 2050 21 0200 21 0600   BP: 112/59 124/71 105/56 102/54   Pulse: 66 78 71 62   Resp:  19 18 18   Temp:  36.8 °C (98.2 °F) 36.7 °C (98 °F) 36.4 °C (97.6 °F)   TempSrc:  Temporal Temporal Temporal   SpO2:  95% 97% 98%   Weight:       Height:         General: No acute distress, resting comfortably in bed.  HEENT: normocephalic, nontraumatic, PERRLA, EOMI  Cardiovascular: Heart RRR with no murmurs, rubs or gallops. Distal Pulses 2+  Respiratory: symmetric chest expansion, lungs CTA bilaterally with no wheezes rales or rhonci  Abdomen: soft, mildly tender, fundus firm, +BS  Genitourinary: lochia light, denies excessive vaginal bleeding  Musculoskeletal: strength 5/5 in four extremities  Neuro: non focal with no numbness, tingling or changes in sensation    Recent Labs     21  1105 21  0111   WBC 19.8* 17.9*   RBC 4.37 3.82*   HEMOGLOBIN 13.7 12.0   HEMATOCRIT 39.8 35.4*   MCV 91.1 92.7   MCH 31.4 31.4   RDW 42.2 43.8   PLATELETCT 288 269   MPV 10.7 11.0   NEUTSPOLYS 84.90*  --    LYMPHOCYTES 7.40*  --    MONOCYTES 5.50  --    EOSINOPHILS 0.30  --    BASOPHILS 0.50  --      No results for input(s): SODIUM, POTASSIUM, CHLORIDE, CO2, GLUCOSE, BUN, CPKTOTAL in the last 72 hours.    Current Meds:   Current Facility-Administered Medications   Medication Dose Frequency Provider Last Rate Last Admin   • ondansetron (ZOFRAN ODT) dispertab 4 mg  4 mg Q6HRS PRN Michael Valadez M.D.        Or   • ondansetron (ZOFRAN) syringe/vial injection 4 mg  4 mg Q6HRS PRN Michael Valadez M.D.       • oxytocin (PITOCIN) infusion (for postpartum)   mL/hr Continuous Michael Valadez M.D. 125 mL/hr at 21 125 mL/hr at 21   • ibuprofen (MOTRIN) tablet  600 mg  600 mg Q6HRS PRN Michael Valadez M.D.   600 mg at 21 0515   • oxyCODONE-acetaminophen (PERCOCET) 5-325 MG per tablet 1 tablet  1 tablet Q4HRS PRN Michael Valadez M.D.       • LR infusion   PRN Michael Valadez M.D.       • tetanus-dipth-acell pertussis (Tdap) inj 0.5 mL  0.5 mL Once PRN Michael Valadez M.D.       • measles, mumps and rubella vaccine (MMR) injection 0.5 mL  0.5 mL Once PRN Michael Valadez M.D.       • PRN oxytocin (PITOCIN) (20 Units/1000 mL) PRN for excessive uterine bleeding - See Admin Instr  125-999 mL/hr Once PRN Michael Valadez M.D.       • miSOPROStol (CYTOTEC) tablet 600 mcg  600 mcg Once PRN Michael Valadez M.D.       • methylergonovine (METHERGINE) injection 0.2 mg  0.2 mg Once PRN Michael Valadez M.D.       • carboPROST (HEMABATE) injection 250 mcg  250 mcg Once PRN Michael Valadez M.D.       • docusate sodium (COLACE) capsule 100 mg  100 mg BID PRN Michael Valadez M.D.       • bisacodyl (DULCOLAX) suppository 10 mg  10 mg PRN Michael Valadez M.D.       • magnesium hydroxide (MILK OF MAGNESIA) suspension 30 mL  30 mL Q6HRS PRN Michael Valadez M.D.       Last reviewed on 2021 11:50 AM by Toby Carmona M.D.       Assessment:  26 y.o. female  at 38w3d PPD#1 s/p     Plan:   1. Routine care  2. Discharge tomorrow      Lazaro Morse MD

## 2021-05-25 NOTE — CARE PLAN
The patient is Stable - Low risk of patient condition declining or worsening    Shift Goals  Clinical Goals: pt will maintain tolerable pain level; breastfeeding    Progress made toward(s) clinical / shift goals:  pt pain level has been a 2-3. Pt taking motrin to alleviate pain. Assisted with breastfeeding and latching baby. Football hold is comfortable with pt.     Patient is not progressing towards the following goals:

## 2021-05-25 NOTE — DISCHARGE PLANNING
Discharge Planning Assessment Post Partum    Reason for Referral:  Consult-history of major depressive disorder in 2017-in full remission.  Address: [address] [place] [address] [place] [address]  Phone: [phone]  Type of Living Situation: living with FOB  Mom Diagnosis: Pregnancy  Baby Diagnosis: -38.3 weeks  Primary Language: English    Name of Baby: Kai (: 21)  Father of the Baby: Charlie Yousif   Involved in baby’s care? Yes  Contact Information: [phone]    [facility]ren[facility] Care: Yes-[facility]  Mom's PCP: ASMITA Alonzo  PCP for new baby: Dr. Rubio    Support System: FOB  Coping/Bonding between mother & baby: Yes  Source of Feeding: breast feeding  Supplies for Infant: prepared for infant    Mom's Insurance: [employer]  Baby Covered on Insurance:Yes  Mother Employed/School: [employer]  Other children in the home/names & ages: No, first baby    Financial Hardship/Income: No   Mom's Mental status: alert and oriented  Services used prior to admit: None    CPS History: No  Psychiatric History: history of major depressive disorder in 2017-in full remission.  SW talked to MOB about post partum depression and provided her with a counseling resource.  Domestic Violence History: No  Drug/ETOH History: No    Resources Provided: post partum support and counseling resources and a list of pediatricians provided to mother  Referrals Made: none     Clearance for Discharge: Infant is cleared to discharge home with parents

## 2021-05-25 NOTE — LACTATION NOTE
This note was copied from a baby's chart.  Checked in with parents to see if baby had waken up and latched to breast for a feeding, but they report that he had just returned to room from his circumcision. Mom eating lunch and baby sound asleep.     Advised her to finish eating then wake baby and unwrap him and put him skin to skin. I recommended that we would like to see him have a good feeding prior to being discharged home.

## 2021-05-25 NOTE — ANESTHESIA POSTPROCEDURE EVALUATION
Patient: Karol Baca    Procedure Summary     Date: 05/24/21 Room / Location:     Anesthesia Start: 1158 Anesthesia Stop: 1531    Procedure: Labor Epidural Diagnosis:     Scheduled Providers:  Responsible Provider: Toby Carmona M.D.    Anesthesia Type: epidural ASA Status: 2          Final Anesthesia Type: epidural  Last vitals  BP   Blood Pressure: 119/58    Temp   36.6 °C (97.9 °F)    Pulse   80   Resp   20    SpO2   98 %      Anesthesia Post Evaluation    Patient location during evaluation: floor  Patient participation: complete - patient participated  Level of consciousness: awake and alert  Pain score: 0    Airway patency: patent  Anesthetic complications: no  Cardiovascular status: hemodynamically stable  Respiratory status: acceptable  Hydration status: euvolemic    PONV: none          No complications documented.     Nurse Pain Score: 0 (NPRS)

## 2021-05-25 NOTE — DISCHARGE SUMMARY
Discharge Summary:     Date of Admission: 2021  Date of Discharge: 21      Admitting diagnosis:    1. Pregnancy @ 38w3d      Discharge Diagnosis:   1. Status post vaginal, spontaneous.    Pregnancy Complications: none  Tubal Ligation:  no    Past Medical History:   Diagnosis Date   • Anxiety    • Endometriosis      OB History    Para Term  AB Living   2 1 1   1 1   SAB TAB Ectopic Molar Multiple Live Births   1       0 1      # Outcome Date GA Lbr Obdulio/2nd Weight Sex Delivery Anes PTL Lv   2 Term 21 38w3d / 00:41 2.645 kg (5 lb 13.3 oz) M Vag-Spont EPI N LENY   1 SAB              Past Surgical History:   Procedure Laterality Date   • GYN SURGERY      exploratory lap   • OTHER ORTHOPEDIC SURGERY Right     frx arm   • WIDE EXCISION MELANOMA, LEG, W/POSS.STSG       Paxil [paroxetine] and Sulfa drugs    Patient Active Problem List   Diagnosis   • Endometriosis   • Migraine without aura   • Recurrent major depressive disorder, in full remission (HCC)   • Asthma affecting pregnancy in third trimester   • Rubella non-immune status, antepartum       Hospital Course:   26 y.o. , now para 1, was admitted with the above mentioned diagnosis, underwent Active Labor, vaginal, spontaneous. Pt gave birth to baby boy with APGARs of 8/9 and weight 2645g.  Patient's postpartum course was unremarkable, with progressive advancement in diet, ambulation and toleration of oral analgesia. Patient without complaints today and desires discharge.  For postpartum contraception, pt desires likely pills but would like to discuss during routine postpartum visit.    Physical Exam:  Temp:  [36.4 °C (97.6 °F)-37.6 °C (99.7 °F)] 36.9 °C (98.4 °F)  Pulse:  [62-79] 79  Resp:  [16-19] 16  BP: (102-124)/(51-71) 106/51  SpO2:  [93 %-98 %] 93 %  Physical Exam  General: well  Chest/Breasts: breasts soft   Abdomen: nontender, soft, non-distended  Fundus: firm and below umbilicus  Incision: not applicable, (vaginal  delivery)  Perineum: deferred  Extremities: symmetric and no edema, calves nontender    Current Facility-Administered Medications   Medication Dose   • ondansetron (ZOFRAN ODT) dispertab 4 mg  4 mg    Or   • ondansetron (ZOFRAN) syringe/vial injection 4 mg  4 mg   • oxytocin (PITOCIN) infusion (for postpartum)   mL/hr   • ibuprofen (MOTRIN) tablet 600 mg  600 mg   • oxyCODONE-acetaminophen (PERCOCET) 5-325 MG per tablet 1 tablet  1 tablet   • LR infusion     • tetanus-dipth-acell pertussis (Tdap) inj 0.5 mL  0.5 mL   • measles, mumps and rubella vaccine (MMR) injection 0.5 mL  0.5 mL   • PRN oxytocin (PITOCIN) (20 Units/1000 mL) PRN for excessive uterine bleeding - See Admin Instr  125-999 mL/hr   • miSOPROStol (CYTOTEC) tablet 600 mcg  600 mcg   • methylergonovine (METHERGINE) injection 0.2 mg  0.2 mg   • carboPROST (HEMABATE) injection 250 mcg  250 mcg   • docusate sodium (COLACE) capsule 100 mg  100 mg   • bisacodyl (DULCOLAX) suppository 10 mg  10 mg   • magnesium hydroxide (MILK OF MAGNESIA) suspension 30 mL  30 mL       Recent Labs     21  1105 21  0111   WBC 19.8* 17.9*   RBC 4.37 3.82*   HEMOGLOBIN 13.7 12.0   HEMATOCRIT 39.8 35.4*   MCV 91.1 92.7   MCH 31.4 31.4   MCHC 34.4 33.9   RDW 42.2 43.8   PLATELETCT 288 269   MPV 10.7 11.0       Activity:   Discharge to home  Pelvic Rest x 6 weeks  Call or come to ED for: heavy vaginal bleeding, fever >100.4, severe abdominal pain, severe headache, chest pain, shortness of breath,  N/V, incisional drainage, or other concerns.      Assessment:  normal postpartum course     [facility]: [facility] in 5 weeks for vaginal delivery; 1 week for incision check for  delivery.      Discharge Instructions:  Pelvic rest x 6 weeks  No heavy lifting until cleared by physician  Return to ED or come to the office for severe headache, shortness of breath, chest pain, heavy vaginal bleeding, incisional drainage, foul smelling vaginal  discharge, or fever >100.4     Discharge Meds:   Current Outpatient Medications   Medication Sig Dispense Refill   • docusate sodium 100 MG Cap Take 100 mg by mouth 2 times a day as needed for Constipation. 60 capsule 0   • ibuprofen (MOTRIN) 600 MG Tab Take 1.5 Tablets by mouth every 6 hours as needed (For cramping after delivery; do not give if patient is receiving ketorolac (Toradol)). 60 tablet 0     Onelia López MD  Family Medicine Resident, PGY-1

## 2021-05-25 NOTE — LACTATION NOTE
This note was copied from a baby's chart.  Physical assessment of baby and mother provided. Introduction to basics of initiating breastfeeding shown at this time to include posture, angle of latch, hand expression, skin to skin and normal  feeding patterns and expectations.    We were unable to achieve a latch at this time due to baby being very sleepy. I advised parents to allow him to remain in place for awhile skin to skin to see if he will wake up.

## 2021-05-26 NOTE — DISCHARGE PLANNING
Meds-to-Beds: Discharge prescription orders listed below delivered to patient's bedside. RN Magdy notified. Patient counseled.  Patient elected to have co-payment paid with albarran.      Karol Baca   Home Medication Instructions BENITO:[id number]    Printed on:05/25/21 3633   Medication Information                      docusate sodium 100 MG Cap  Take 1 capsule by mouth 2 times a day as needed for Constipation.             ibuprofen (MOTRIN) 800 MG Tab  Take 1 tablet by mouth every 6 hours as needed for cramping after delivery                 Annamarie Leo, PharmD

## 2021-05-26 NOTE — DISCHARGE INSTRUCTIONS
PATIENT DISCHARGE EDUCATION INSTRUCTION SHEET  REASONS TO CALL YOUR OBSTETRICIAN  · Persistent fever, shaking, chills (Temperature higher than 100.4) may indicate you have an infection  · Heavy bleeding: soaking more than 1 pad per hour; Passing clots an egg-sized clot or bigger may mean you have an postpartum hemorrhage  · Foul odor from vagina or bad smelling or discolored discharge or blood  · Breast infection (Mastitis symptoms); breast pain, chills, fever, redness or red streaks, may feel flu like symptoms  · Urinary pain, burning or frequency  · Incision that is not healing, increased redness, swelling, tenderness or pain, or any pus from episiotomy or  site may mean you have an infection  · Redness, swelling, warmth, or painful to touch in the calf area of your leg may mean you have a blood clot  · Severe or intensified depression, thoughts or feelings of wanting to hurt yourself or someone else   · Pain in chest, obstructed breathing or shortness of breath (trouble catching your breath) may mean you are having a postpartum complication. Call your provider immediately   · Headache that does not get better, even after taking medicine, a bad headache with vision changes or pain in the upper right area of your belly may mean you have high blood pressure or post birth preeclampsia. Call your provider immediately    HAND WASHING  All family and friends should wash their hands:  · Before and after holding the baby  · Before feeding the baby  · After using the restroom or changing the baby's diaper    WOUND CARE  Ask your physician for additional care instructions. In general:  ·  Incision:  · May shower and pat incision dry   · Keep the incision clean and dry  · There should not be any opening or pus from the incision  · Continue to walk at home 3 times a day   · Do NOT lift anything heavier than your baby (over 10 pounds)  · Encourage family to help participate in care of the  to allow  rest and mom time to heal  · Episiotomy/Laceration  · May use arcadio-spray bottle, witch hazel pads and dermaplast spray for comfort  · Use arcadio-spray bottle after urinating to cleanse perineal area  · To prevent burning during urination spray arcadio-water bottle on labial area   · Pat perineal area dry until episiotomy/laceration is healed  · Continue to use arcadio-bottle until bleeding stops as needed  · If have a 2nd degree laceration or greater, a Sitz bath can offer relief from soreness, burning, and inflammation   · Sitz Bath   · Sit in 6 inches of warm water and soak laceration as needed until the laceration heals    VAGINAL CARE AND BLEEDING  · Nothing inside vagina for 6 weeks:   · No sexual intercourse, tampons or douching  · Bleeding may continue for 2-4 weeks. Amount and color may vary  · Soaking 1 pad or more in an hour for several hours is considered heavy bleeding  · Passing large egg sized blood clots can be concerning  · If you feel like you have heavy bleeding or are having increasing amount of blood clots call your Obstetrician immediately  · If you begin feeling faint upon standing, feeling sick to your stomach, have clammy skin, a really fast heartbeat, have chills, start feeling confused, dizzy, sleepy or weak, or feeling like you're going to faint call your Obstetrician immediately    HYPERTENSION   Preeclampsia or gestational hypertension are types of high blood pressure that only pregnant women can get. It is important for you to be aware of symptoms to seek early intervention and treatment. If you have any of these symptoms immediately call your Obstetrician    · Vision changes or blurred vision   · Severe headache or pain that is unrelieved with medication and will not go away  · Persistent pain in upper abdomen or shoulder   · Increased swelling of face, feet, or hands  · Difficulty breathing or shortness of breath at rest  · Urinating less than usual    URINATION AND BOWEL MOVEMENTS  · Eating  "more fiber (bran cereal, fruits, and vegetables) and drinking plenty of fluids will help to avoid constipation  · Urinary frequency and urgency after childbirth is normal  · If you experience any urinary pain, burning or frequency call your provider    BIRTH CONTROL  · It is possible to become pregnant at any time after delivery and while breastfeeding  · Plan to discuss a method of birth control with your physician at your post delivery follow up visit    POSTPARTUM BLUES  During the first few days after birth, you may experience a sense of the \"blues\" which may include impatience, irritability or even crying. These feelings come and go quickly. However, as many as 1 in 10 women experience emotional symptoms known as postpartum depression.     POSTPARTUM DEPRESSION    May start as early as the second or third day after delivery or take several weeks or months to develop. Symptoms of \"blues\" are present, but are more intense: Crying spells; loss of appetite; feelings of hopelessness or loss of control; fear of touching the baby; over concern or no concern at all about the baby; little or no concern about your own appearance/caring for yourself; and/or inability to sleep or excessive sleeping. Contact your Obstetrician if you are experiencing any of these symptoms     PREVENTING SHAKEN BABY  If you are angry or stressed, PUT THE BABY IN THE CRIB, step away, take some deep breaths, and wait until you are calm to care for the baby. DO NOT SHAKE THE BABY. You are not alone, call a supporter for help.  · [facility] 24/7 crisis call line (895-036-1791) or (1-632.828.5344)  · You can also text them, text \"ANSWER\" (783084)      "

## 2021-05-26 NOTE — CARE PLAN
The patient is Stable - Low risk of patient condition declining or worsening    Shift Goals  Clinical Goals: pain management  Patient Goals: pain management; breast feeding  Family Goals: breast feeding    Progress made toward(s) clinical / shift goals:  Pain was well controlled; mother was able to breastfeed.     Patient is not progressing towards the following goals:

## 2021-05-26 NOTE — PROGRESS NOTES
Primary Reviewed discharge instructions and prescriptions with patient. All questions and concerns addressed.

## 2022-02-03 ENCOUNTER — OFFICE VISIT (OUTPATIENT)
Dept: MEDICAL GROUP | Facility: IMAGING CENTER | Age: 27
End: 2022-02-03
Payer: COMMERCIAL

## 2022-02-03 VITALS
WEIGHT: 143.2 LBS | BODY MASS INDEX: 23.86 KG/M2 | SYSTOLIC BLOOD PRESSURE: 112 MMHG | RESPIRATION RATE: 14 BRPM | TEMPERATURE: 98 F | OXYGEN SATURATION: 98 % | HEART RATE: 62 BPM | DIASTOLIC BLOOD PRESSURE: 74 MMHG | HEIGHT: 65 IN

## 2022-02-03 DIAGNOSIS — Z13.31 DEPRESSION SCREENING: ICD-10-CM

## 2022-02-03 DIAGNOSIS — Z13.6 SCREENING FOR CARDIOVASCULAR CONDITION: ICD-10-CM

## 2022-02-03 DIAGNOSIS — D36.7 NASAL DERMOID CYST: ICD-10-CM

## 2022-02-03 DIAGNOSIS — F33.42 RECURRENT MAJOR DEPRESSIVE DISORDER, IN FULL REMISSION (HCC): ICD-10-CM

## 2022-02-03 DIAGNOSIS — O99.513 ASTHMA AFFECTING PREGNANCY IN THIRD TRIMESTER: ICD-10-CM

## 2022-02-03 DIAGNOSIS — Z11.3 SCREENING EXAMINATION FOR SEXUALLY TRANSMITTED DISEASE: ICD-10-CM

## 2022-02-03 DIAGNOSIS — G43.019 INTRACTABLE MIGRAINE WITHOUT AURA AND WITHOUT STATUS MIGRAINOSUS: ICD-10-CM

## 2022-02-03 DIAGNOSIS — Z76.89 ENCOUNTER TO ESTABLISH CARE WITH NEW DOCTOR: ICD-10-CM

## 2022-02-03 DIAGNOSIS — Z13.1 DIABETES MELLITUS SCREENING: ICD-10-CM

## 2022-02-03 DIAGNOSIS — N80.9 ENDOMETRIOSIS: ICD-10-CM

## 2022-02-03 DIAGNOSIS — J45.909 ASTHMA AFFECTING PREGNANCY IN THIRD TRIMESTER: ICD-10-CM

## 2022-02-03 DIAGNOSIS — Z23 NEED FOR VACCINATION: ICD-10-CM

## 2022-02-03 PROCEDURE — 90651 9VHPV VACCINE 2/3 DOSE IM: CPT | Performed by: CLINICAL NURSE SPECIALIST

## 2022-02-03 PROCEDURE — 90732 PPSV23 VACC 2 YRS+ SUBQ/IM: CPT | Performed by: CLINICAL NURSE SPECIALIST

## 2022-02-03 PROCEDURE — 99214 OFFICE O/P EST MOD 30 MIN: CPT | Mod: 25 | Performed by: CLINICAL NURSE SPECIALIST

## 2022-02-03 PROCEDURE — 90686 IIV4 VACC NO PRSV 0.5 ML IM: CPT | Performed by: CLINICAL NURSE SPECIALIST

## 2022-02-03 PROCEDURE — 90472 IMMUNIZATION ADMIN EACH ADD: CPT | Performed by: CLINICAL NURSE SPECIALIST

## 2022-02-03 PROCEDURE — 90471 IMMUNIZATION ADMIN: CPT | Performed by: CLINICAL NURSE SPECIALIST

## 2022-02-03 RX ORDER — ALBUTEROL SULFATE 90 UG/1
2 AEROSOL, METERED RESPIRATORY (INHALATION) EVERY 6 HOURS PRN
COMMUNITY
End: 2022-08-07

## 2022-02-03 ASSESSMENT — PAIN SCALES - GENERAL: PAINLEVEL: NO PAIN

## 2022-02-03 ASSESSMENT — PATIENT HEALTH QUESTIONNAIRE - PHQ9
SUM OF ALL RESPONSES TO PHQ QUESTIONS 1-9: 2
CLINICAL INTERPRETATION OF PHQ2 SCORE: 1
5. POOR APPETITE OR OVEREATING: 0 - NOT AT ALL

## 2022-02-03 NOTE — PROGRESS NOTES
"Subjective     Karol Baca is a 26 y.o. female who presents with Establish Care and Cyst (middle of nose, birth defect )            HPI  Nasal dermoid cyst  Cyst present since birth on bridge of nose.  Frequently fills with fluid that is easy to expel and grows thick, black hairs. She would like this removed. She denies frequent sinus infections or difficulty breathing through her nose.    Recurrent major depressive disorder, in full remission (HCC)  No antidepressants or therapy currently and she if feeling well.   Using coping skills.    Migraine without aura  No recent episodes >1 year.    Endometriosis  She has a gynecologist but she believes he is retiring.    Asthma affecting pregnancy in third trimester  Asthma is exercise induced.  Uses inhaler with exercise.       ROS  See HPI      Allergies   Allergen Reactions   • Paxil [Paroxetine] Unspecified     Gave patient UTI that led to hospitalization   • Sulfa Drugs Hives     Current Outpatient Medications on File Prior to Visit   Medication Sig Dispense Refill   • Multiple Vitamins-Minerals (WOMENS MULTI PO) Take  by mouth.     • albuterol 108 (90 Base) MCG/ACT Aero Soln inhalation aerosol Inhale 2 Puffs every 6 hours as needed.       No current facility-administered medications on file prior to visit.           Objective     /74 (BP Location: Left arm, Patient Position: Sitting, BP Cuff Size: Small adult)   Pulse 62   Temp 36.7 °C (98 °F) (Temporal)   Resp 14   Ht 1.651 m (5' 5\")   Wt 65 kg (143 lb 3.2 oz)   LMP 01/20/2022   SpO2 98%   BMI 23.83 kg/m²      Physical Exam  Constitutional:       General: She is not in acute distress.     Appearance: Normal appearance. She is not ill-appearing, toxic-appearing or diaphoretic.   HENT:      Head: Normocephalic and atraumatic.        Comments: Nose with erythematous small cavity without exudate at nasal bridge.  Eyes:      Pupils: Pupils are equal, round, and reactive to light. "   Cardiovascular:      Rate and Rhythm: Normal rate and regular rhythm.      Heart sounds: Normal heart sounds.   Pulmonary:      Effort: Pulmonary effort is normal.      Breath sounds: Normal breath sounds.   Skin:     General: Skin is warm and dry.   Neurological:      Mental Status: She is alert and oriented to person, place, and time.      Gait: Gait normal.   Psychiatric:         Mood and Affect: Mood normal.         Behavior: Behavior normal.         Thought Content: Thought content normal.         Judgment: Judgment normal.                             Assessment & Plan        1. Encounter to establish care with new doctor  Karol is in clinic to establish care today.  She is interested in getting her nasal dermoid cyst removed.    2. Nasal dermoid cyst  Chronic condition from birth.  Her nasal bridge has a small 1 mm cavity surrounded by erythematous skin approximately 1 cm in diameter.  No exudate at this time.  We discussed referral to ENT or plastic surgery and decided ENT was ordered with start.  She has never seen an ENT previously.  - Referral to ENT    3. Depression screening  PHQ-9 score 2.     4. Recurrent major depressive disorder, in full remission (HCC)   She feels she can manage any symptoms with previously learned coping mechanisms.  She is not interested in medication or therapy today.    5. Intractable migraine without aura and without status migrainosus  None recently.  Continue to monitor.    6. Endometriosis  Defer management to gynecology    7. Asthma affecting pregnancy in third trimester  Continue albuterol inhaler as needed.    Return if symptoms worsen or fail to improve, for With test results.    The patient verbalized agreement and understanding of the current plan. All questions and concerns were addressed at the time of visit.    This note was dictated using Dragon Software.  I have made every reasonable attempt to correct errors, but errors of grammar and content may still  exist.

## 2022-02-03 NOTE — ASSESSMENT & PLAN NOTE
Cyst present since birth on bridge of nose.  Frequently fills with fluid that is easy to expel and grows thick, black hairs. She would like this removed. She denies frequent sinus infections or difficulty breathing through her nose.

## 2022-03-10 ENCOUNTER — TELEPHONE (OUTPATIENT)
Dept: MEDICAL GROUP | Facility: IMAGING CENTER | Age: 27
End: 2022-03-10
Payer: COMMERCIAL

## 2022-03-11 ENCOUNTER — PATIENT MESSAGE (OUTPATIENT)
Dept: MEDICAL GROUP | Facility: IMAGING CENTER | Age: 27
End: 2022-03-11
Payer: COMMERCIAL

## 2022-03-11 DIAGNOSIS — D36.7 NASAL DERMOID CYST: ICD-10-CM

## 2022-03-11 NOTE — TELEPHONE ENCOUNTER
Received message from patient. She states she needs a referral to a plastic surgeon, Dr. Lazaro Pierce. She states she has talked with Hector about this prior.

## 2022-04-04 DIAGNOSIS — D36.7 NASAL DERMOID CYST: ICD-10-CM

## 2022-05-02 DIAGNOSIS — D36.7 NASAL DERMOID CYST: ICD-10-CM

## 2022-08-07 ENCOUNTER — APPOINTMENT (OUTPATIENT)
Dept: RADIOLOGY | Facility: IMAGING CENTER | Age: 27
End: 2022-08-07
Attending: NURSE PRACTITIONER
Payer: COMMERCIAL

## 2022-08-07 ENCOUNTER — OFFICE VISIT (OUTPATIENT)
Dept: URGENT CARE | Facility: PHYSICIAN GROUP | Age: 27
End: 2022-08-07
Payer: COMMERCIAL

## 2022-08-07 VITALS
BODY MASS INDEX: 24.16 KG/M2 | SYSTOLIC BLOOD PRESSURE: 104 MMHG | HEIGHT: 65 IN | OXYGEN SATURATION: 99 % | DIASTOLIC BLOOD PRESSURE: 68 MMHG | RESPIRATION RATE: 18 BRPM | WEIGHT: 145 LBS | HEART RATE: 72 BPM | TEMPERATURE: 98.4 F

## 2022-08-07 DIAGNOSIS — M25.561 ACUTE PAIN OF RIGHT KNEE: ICD-10-CM

## 2022-08-07 PROCEDURE — 99214 OFFICE O/P EST MOD 30 MIN: CPT | Performed by: NURSE PRACTITIONER

## 2022-08-07 PROCEDURE — 73562 X-RAY EXAM OF KNEE 3: CPT | Mod: TC,RT | Performed by: RADIOLOGY

## 2022-08-07 ASSESSMENT — ENCOUNTER SYMPTOMS
EYE REDNESS: 0
WEAKNESS: 0
NUMBNESS: 0
DIZZINESS: 0
SHORTNESS OF BREATH: 0
NAUSEA: 0
MYALGIAS: 0
CHILLS: 0
VOMITING: 0
JOINT SWELLING: 1
FEVER: 0
SORE THROAT: 0

## 2022-08-07 NOTE — PROGRESS NOTES
Subjective:   Karol Baca is a 27 y.o. female who presents for Knee Pain (Right knee,painful,swollen,x1 week)      Knee Pain  This is a new problem. The current episode started in the past 7 days (Injured a week ago however felt a pop in her knee today exacerbated pain). The problem occurs constantly. The problem has been gradually worsening. Associated symptoms include joint swelling. Pertinent negatives include no chest pain, chills, fever, myalgias, nausea, numbness, rash, sore throat, vomiting or weakness. The symptoms are aggravated by walking. She has tried acetaminophen and rest for the symptoms. The treatment provided no relief.       Review of Systems   Constitutional: Negative for chills and fever.   HENT: Negative for sore throat.    Eyes: Negative for redness.   Respiratory: Negative for shortness of breath.    Cardiovascular: Negative for chest pain.   Gastrointestinal: Negative for nausea and vomiting.   Genitourinary: Negative for dysuria.   Musculoskeletal: Positive for joint pain and joint swelling. Negative for myalgias.   Skin: Negative for rash.   Neurological: Negative for dizziness, weakness and numbness.       Medications:    • This patient does not have an active medication from one of the medication groupers.    Allergies: Paxil [paroxetine] and Sulfa drugs    Problem List: Karol Baca does not have any pertinent problems on file.    Surgical History:  Past Surgical History:   Procedure Laterality Date   • GYN SURGERY      exploratory lap   • OTHER ORTHOPEDIC SURGERY Right     frx arm   • WIDE EXCISION MELANOMA, LEG, W/POSS.STSG         Past Social Hx: Karol Baca  reports that she quit smoking about 2 years ago. Her smoking use included cigarettes. She has a 5.25 pack-year smoking history. She has never used smokeless tobacco. She reports current alcohol use. She reports current drug use.     Past Family Hx:  Karol Baca family history includes  "Cancer in her maternal grandfather; Heart Disease in her father and paternal grandmother; No Known Problems in her brother, brother, and brother; Other in her maternal grandmother; Psychiatric Illness in her maternal grandmother and mother; Stroke in her maternal grandmother.     Problem list, medications, and allergies reviewed by myself today in Epic.     Objective:     /68 (BP Location: Right arm, Patient Position: Sitting, BP Cuff Size: Adult)   Pulse 72   Temp 36.9 °C (98.4 °F) (Temporal)   Resp 18   Ht 1.651 m (5' 5\")   Wt 65.8 kg (145 lb)   SpO2 99%   BMI 24.13 kg/m²     Physical Exam  Constitutional:       Appearance: Normal appearance. She is not ill-appearing or toxic-appearing.   HENT:      Head: Normocephalic.      Right Ear: External ear normal.      Left Ear: External ear normal.      Nose: Nose normal.      Mouth/Throat:      Lips: Pink.      Mouth: Mucous membranes are moist.   Eyes:      General: Lids are normal.         Right eye: No discharge.         Left eye: No discharge.   Pulmonary:      Effort: Pulmonary effort is normal. No accessory muscle usage or respiratory distress.   Musculoskeletal:      Cervical back: Full passive range of motion without pain.      Right knee: Swelling present. Decreased range of motion. Tenderness present over the medial joint line and lateral joint line. ACL laxity present.      Instability Tests: Anterior drawer test positive. Posterior drawer test positive. Anterior Lachman test negative. Medial Vivian test negative and lateral Vivian test negative.   Skin:     Coloration: Skin is not pale.   Neurological:      Mental Status: She is alert and oriented to person, place, and time.   Psychiatric:         Mood and Affect: Mood normal.         Thought Content: Thought content normal.         Assessment/Plan:     Diagnosis and associated orders:     1. Acute pain of right knee  DX-KNEE 3 VIEWS RIGHT    Referral to Orthopedics        Comments/MDM: "     I independently reviewed the patient's imaging and agree with the interpretation of the radiologist.    Unremarkable knee series.       I personally reviewed prior external notes and prior test results pertinent to today's visit.   X-ray negative for fracture dislocation, patient provided a knee brace, she declined crutches weightbearing as tolerated, Tylenol ibuprofen, ice, resume activity as tolerated.  Follow-up with specialist for further evaluation management discussed management options, risks and benefits, and alternatives to treatment plan agreed upon.   Red flags discussed and indications to immediately call 911 or present to the Emergency Department.   Supportive care, differential diagnoses, and indications for immediate follow-up discussed with patient.    Patient expresses understanding and agrees to plan. Patient denies any other questions or concerns.          My total time spent caring for the patient on the day of the encounter was 30 minutes.   This does not include time spent on separately billable procedures/tests.      Please note that this dictation was created using voice recognition software. I have made a reasonable attempt to correct obvious errors, but I expect that there are errors of grammar and possibly content that I did not discover before finalizing the note.    This note was electronically signed by Hank TIAN.

## 2022-10-14 ENCOUNTER — HOSPITAL ENCOUNTER (OUTPATIENT)
Facility: MEDICAL CENTER | Age: 27
End: 2022-10-14
Attending: PLASTIC SURGERY
Payer: COMMERCIAL

## 2022-10-14 PROCEDURE — 88305 TISSUE EXAM BY PATHOLOGIST: CPT

## 2022-10-15 LAB — PATHOLOGY CONSULT NOTE: NORMAL

## 2022-12-13 NOTE — ED TRIAGE NOTES
Medical Hx updated.  Surgical Hx updated.  HM updated.  Reminder placed.  Episode resolved.  Result note completed   Letter sent.     Sore throat since yesterday with white patches per pt. Pt states severe headache with nausea.Pt's work had 5 cases of covid with one death, concerned and here for assessment.